# Patient Record
Sex: MALE | Race: WHITE | HISPANIC OR LATINO | URBAN - METROPOLITAN AREA
[De-identification: names, ages, dates, MRNs, and addresses within clinical notes are randomized per-mention and may not be internally consistent; named-entity substitution may affect disease eponyms.]

---

## 2017-04-10 ENCOUNTER — OUTPATIENT (OUTPATIENT)
Dept: OUTPATIENT SERVICES | Facility: HOSPITAL | Age: 57
LOS: 1 days | End: 2017-04-10
Payer: COMMERCIAL

## 2017-04-10 VITALS
RESPIRATION RATE: 16 BRPM | SYSTOLIC BLOOD PRESSURE: 154 MMHG | HEIGHT: 70 IN | TEMPERATURE: 98 F | DIASTOLIC BLOOD PRESSURE: 90 MMHG | HEART RATE: 70 BPM | WEIGHT: 194.01 LBS

## 2017-04-10 DIAGNOSIS — Z98.890 OTHER SPECIFIED POSTPROCEDURAL STATES: Chronic | ICD-10-CM

## 2017-04-10 DIAGNOSIS — I25.10 ATHEROSCLEROTIC HEART DISEASE OF NATIVE CORONARY ARTERY WITHOUT ANGINA PECTORIS: Chronic | ICD-10-CM

## 2017-04-10 DIAGNOSIS — Z01.818 ENCOUNTER FOR OTHER PREPROCEDURAL EXAMINATION: ICD-10-CM

## 2017-04-10 DIAGNOSIS — S83.272A COMPLEX TEAR OF LATERAL MENISCUS, CURRENT INJURY, LEFT KNEE, INITIAL ENCOUNTER: ICD-10-CM

## 2017-04-10 DIAGNOSIS — M25.562 PAIN IN LEFT KNEE: ICD-10-CM

## 2017-04-10 DIAGNOSIS — I25.10 ATHEROSCLEROTIC HEART DISEASE OF NATIVE CORONARY ARTERY WITHOUT ANGINA PECTORIS: ICD-10-CM

## 2017-04-10 LAB
ANION GAP SERPL CALC-SCNC: 10 MMOL/L — SIGNIFICANT CHANGE UP (ref 5–17)
BUN SERPL-MCNC: 23 MG/DL — SIGNIFICANT CHANGE UP (ref 7–23)
CALCIUM SERPL-MCNC: 8.7 MG/DL — SIGNIFICANT CHANGE UP (ref 8.5–10.1)
CHLORIDE SERPL-SCNC: 103 MMOL/L — SIGNIFICANT CHANGE UP (ref 96–108)
CO2 SERPL-SCNC: 24 MMOL/L — SIGNIFICANT CHANGE UP (ref 22–31)
CREAT SERPL-MCNC: 0.78 MG/DL — SIGNIFICANT CHANGE UP (ref 0.5–1.3)
GLUCOSE SERPL-MCNC: 182 MG/DL — HIGH (ref 70–99)
HBA1C BLD-MCNC: 9 % — HIGH (ref 4–5.6)
HCT VFR BLD CALC: 43.4 % — SIGNIFICANT CHANGE UP (ref 39–50)
HGB BLD-MCNC: 15.2 G/DL — SIGNIFICANT CHANGE UP (ref 13–17)
MCHC RBC-ENTMCNC: 31.9 PG — SIGNIFICANT CHANGE UP (ref 27–34)
MCHC RBC-ENTMCNC: 35 GM/DL — SIGNIFICANT CHANGE UP (ref 32–36)
MCV RBC AUTO: 91.1 FL — SIGNIFICANT CHANGE UP (ref 80–100)
PLATELET # BLD AUTO: 197 K/UL — SIGNIFICANT CHANGE UP (ref 150–400)
POTASSIUM SERPL-MCNC: 4.1 MMOL/L — SIGNIFICANT CHANGE UP (ref 3.5–5.3)
POTASSIUM SERPL-SCNC: 4.1 MMOL/L — SIGNIFICANT CHANGE UP (ref 3.5–5.3)
RBC # BLD: 4.76 M/UL — SIGNIFICANT CHANGE UP (ref 4.2–5.8)
RBC # FLD: 12.2 % — SIGNIFICANT CHANGE UP (ref 10.3–14.5)
SODIUM SERPL-SCNC: 137 MMOL/L — SIGNIFICANT CHANGE UP (ref 135–145)
WBC # BLD: 8.1 K/UL — SIGNIFICANT CHANGE UP (ref 3.8–10.5)
WBC # FLD AUTO: 8.1 K/UL — SIGNIFICANT CHANGE UP (ref 3.8–10.5)

## 2017-04-10 PROCEDURE — 93010 ELECTROCARDIOGRAM REPORT: CPT | Mod: NC

## 2017-04-10 PROCEDURE — 80048 BASIC METABOLIC PNL TOTAL CA: CPT

## 2017-04-10 PROCEDURE — 93005 ELECTROCARDIOGRAM TRACING: CPT

## 2017-04-10 PROCEDURE — G0463: CPT

## 2017-04-10 PROCEDURE — 83036 HEMOGLOBIN GLYCOSYLATED A1C: CPT

## 2017-04-10 PROCEDURE — 85027 COMPLETE CBC AUTOMATED: CPT

## 2017-04-10 NOTE — H&P PST ADULT - PMH
CAD S/P percutaneous coronary angioplasty  7/10 5 stents and 7/14 one stent  History of hyperlipidemia    Hypertension    Neuropathy    Type 2 diabetes mellitus BPH (benign prostatic hyperplasia)    CAD S/P percutaneous coronary angioplasty  7/10 5 stents and 7/14 one stent  History of hyperlipidemia    Hypertension    Neuropathy    Type 2 diabetes mellitus

## 2017-04-10 NOTE — H&P PST ADULT - ASSESSMENT
[age] y/o male with left knee meniscus tear, scheduled for left knee arthroscopy. Pre op testing today. medical eval with Dr Yuan and cardiac eval with Dr Hagan advised. Will continue ASA 81 mg as advised by Dr Hagan.

## 2017-04-10 NOTE — H&P PST ADULT - HISTORY OF PRESENT ILLNESS
56 y/o male with c/o pain in the left knee since Dec 18 2016, was hit  by a forklift, pain in the left knee since then[place]. Taking Tylenol for pain. Pre op testing today. 56 y/o male, PMH of HTN, CAD, T2DM, in PST with c/o pain in the left knee since Dec 18 2016, was hit  by a forklift, pain in the left knee since then. [place]. Taking Tylenol for pain. MRI ,shows meniscus tear. Pre op testing today scheduled for left knee arthroscopy

## 2017-04-10 NOTE — H&P PST ADULT - NSANTHOSAYNRD_GEN_A_CORE
No. LEYLA screening performed.  STOP BANG Legend: 0-2 = LOW Risk; 3-4 = INTERMEDIATE Risk; 5-8 = HIGH Risk

## 2017-04-10 NOTE — H&P PST ADULT - FAMILY HISTORY
Mother  Still living? Yes, Estimated age: 81-90  Family history of type 2 diabetes mellitus, Age at diagnosis: Age Unknown     Father  Still living? No  FH: stroke, Age at diagnosis: Age Unknown

## 2017-05-02 ENCOUNTER — TRANSCRIPTION ENCOUNTER (OUTPATIENT)
Age: 57
End: 2017-05-02

## 2017-05-02 RX ORDER — ACETAMINOPHEN 500 MG
650 TABLET ORAL ONCE
Qty: 0 | Refills: 0 | Status: COMPLETED | OUTPATIENT
Start: 2017-05-03 | End: 2017-05-03

## 2017-05-02 RX ORDER — SODIUM CHLORIDE 9 MG/ML
1000 INJECTION, SOLUTION INTRAVENOUS
Qty: 0 | Refills: 0 | Status: DISCONTINUED | OUTPATIENT
Start: 2017-05-03 | End: 2017-05-03

## 2017-05-03 ENCOUNTER — OUTPATIENT (OUTPATIENT)
Dept: OUTPATIENT SERVICES | Facility: HOSPITAL | Age: 57
LOS: 1 days | Discharge: HOME | End: 2017-05-03
Payer: COMMERCIAL

## 2017-05-03 VITALS
HEART RATE: 66 BPM | SYSTOLIC BLOOD PRESSURE: 136 MMHG | WEIGHT: 194.01 LBS | DIASTOLIC BLOOD PRESSURE: 81 MMHG | TEMPERATURE: 98 F | RESPIRATION RATE: 16 BRPM | HEIGHT: 70 IN | OXYGEN SATURATION: 95 %

## 2017-05-03 VITALS
SYSTOLIC BLOOD PRESSURE: 135 MMHG | RESPIRATION RATE: 13 BRPM | DIASTOLIC BLOOD PRESSURE: 71 MMHG | OXYGEN SATURATION: 95 % | HEART RATE: 71 BPM

## 2017-05-03 DIAGNOSIS — Z98.890 OTHER SPECIFIED POSTPROCEDURAL STATES: Chronic | ICD-10-CM

## 2017-05-03 DIAGNOSIS — I25.10 ATHEROSCLEROTIC HEART DISEASE OF NATIVE CORONARY ARTERY WITHOUT ANGINA PECTORIS: Chronic | ICD-10-CM

## 2017-05-03 DIAGNOSIS — Z01.818 ENCOUNTER FOR OTHER PREPROCEDURAL EXAMINATION: ICD-10-CM

## 2017-05-03 DIAGNOSIS — S83.272A COMPLEX TEAR OF LATERAL MENISCUS, CURRENT INJURY, LEFT KNEE, INITIAL ENCOUNTER: ICD-10-CM

## 2017-05-03 PROCEDURE — 88304 TISSUE EXAM BY PATHOLOGIST: CPT | Mod: 26

## 2017-05-03 PROCEDURE — 29880 ARTHRS KNE SRG MNISECTMY M&L: CPT | Mod: LT

## 2017-05-03 PROCEDURE — 88304 TISSUE EXAM BY PATHOLOGIST: CPT

## 2017-05-03 RX ORDER — DULOXETINE HYDROCHLORIDE 30 MG/1
0 CAPSULE, DELAYED RELEASE ORAL
Qty: 0 | Refills: 0 | COMMUNITY

## 2017-05-03 RX ORDER — HYDROMORPHONE HYDROCHLORIDE 2 MG/ML
0.5 INJECTION INTRAMUSCULAR; INTRAVENOUS; SUBCUTANEOUS
Qty: 0 | Refills: 0 | Status: DISCONTINUED | OUTPATIENT
Start: 2017-05-03 | End: 2017-05-04

## 2017-05-03 RX ORDER — ASPIRIN/CALCIUM CARB/MAGNESIUM 324 MG
1 TABLET ORAL
Qty: 0 | Refills: 0 | COMMUNITY

## 2017-05-03 RX ORDER — TAMSULOSIN HYDROCHLORIDE 0.4 MG/1
1 CAPSULE ORAL
Qty: 0 | Refills: 0 | COMMUNITY

## 2017-05-03 RX ORDER — ROSUVASTATIN CALCIUM 5 MG/1
1 TABLET ORAL
Qty: 0 | Refills: 0 | COMMUNITY

## 2017-05-03 RX ORDER — MELOXICAM 15 MG/1
1 TABLET ORAL
Qty: 0 | Refills: 0 | COMMUNITY

## 2017-05-03 RX ORDER — OXYCODONE HYDROCHLORIDE 5 MG/1
1 TABLET ORAL
Qty: 20 | Refills: 0 | OUTPATIENT
Start: 2017-05-03

## 2017-05-03 RX ORDER — SODIUM CHLORIDE 9 MG/ML
1000 INJECTION, SOLUTION INTRAVENOUS
Qty: 0 | Refills: 0 | Status: DISCONTINUED | OUTPATIENT
Start: 2017-05-03 | End: 2017-05-04

## 2017-05-03 RX ORDER — CEFAZOLIN SODIUM 1 G
2000 VIAL (EA) INJECTION ONCE
Qty: 0 | Refills: 0 | Status: COMPLETED | OUTPATIENT
Start: 2017-05-03 | End: 2017-05-03

## 2017-05-03 RX ORDER — CLOPIDOGREL BISULFATE 75 MG/1
1 TABLET, FILM COATED ORAL
Qty: 0 | Refills: 0 | COMMUNITY

## 2017-05-03 RX ORDER — OMEGA-3 ACID ETHYL ESTERS 1 G
0 CAPSULE ORAL
Qty: 0 | Refills: 0 | COMMUNITY

## 2017-05-03 RX ORDER — OXYCODONE HYDROCHLORIDE 5 MG/1
5 TABLET ORAL EVERY 4 HOURS
Qty: 0 | Refills: 0 | Status: DISCONTINUED | OUTPATIENT
Start: 2017-05-03 | End: 2017-05-04

## 2017-05-03 RX ORDER — METFORMIN HYDROCHLORIDE 850 MG/1
1 TABLET ORAL
Qty: 0 | Refills: 0 | COMMUNITY

## 2017-05-03 RX ORDER — SITAGLIPTIN 50 MG/1
1 TABLET, FILM COATED ORAL
Qty: 0 | Refills: 0 | COMMUNITY

## 2017-05-03 RX ORDER — LISINOPRIL 2.5 MG/1
1 TABLET ORAL
Qty: 0 | Refills: 0 | COMMUNITY

## 2017-05-03 RX ORDER — NEBIVOLOL HYDROCHLORIDE 5 MG/1
1 TABLET ORAL
Qty: 0 | Refills: 0 | COMMUNITY

## 2017-05-03 RX ADMIN — Medication 650 MILLIGRAM(S): at 06:51

## 2017-05-03 RX ADMIN — HYDROMORPHONE HYDROCHLORIDE 0.5 MILLIGRAM(S): 2 INJECTION INTRAMUSCULAR; INTRAVENOUS; SUBCUTANEOUS at 09:36

## 2017-05-03 RX ADMIN — SODIUM CHLORIDE 50 MILLILITER(S): 9 INJECTION, SOLUTION INTRAVENOUS at 06:51

## 2017-05-03 RX ADMIN — SODIUM CHLORIDE 100 MILLILITER(S): 9 INJECTION, SOLUTION INTRAVENOUS at 09:22

## 2017-05-03 RX ADMIN — HYDROMORPHONE HYDROCHLORIDE 0.5 MILLIGRAM(S): 2 INJECTION INTRAMUSCULAR; INTRAVENOUS; SUBCUTANEOUS at 09:27

## 2017-05-03 NOTE — BRIEF OPERATIVE NOTE - PROCEDURE
Knee arthroscopy, left  05/03/2017  Left knee arthroscopy, partial medial and lateral meniscectomies and synovectomy  Active  [id number]

## 2017-05-03 NOTE — BRIEF OPERATIVE NOTE - POST-OP DX
Tear of meniscus of left knee  05/03/2017  Medial and Lateral, chondromalacia and synovitis  Active  Faustina Michaels

## 2017-05-03 NOTE — ASU PATIENT PROFILE, ADULT - PMH
BPH (benign prostatic hyperplasia)    CAD S/P percutaneous coronary angioplasty  7/10 5 stents and 7/14 one stent  History of hyperlipidemia    Hypertension    Neuropathy    Type 2 diabetes mellitus

## 2017-05-03 NOTE — ASU DISCHARGE PLAN (ADULT/PEDIATRIC). - NOTIFY
Swelling that continues/Fever greater than 101/Numbness, tingling/Pain not relieved by Medications/Bleeding that does not stop

## 2017-05-03 NOTE — ASU DISCHARGE PLAN (ADULT/PEDIATRIC). - SPECIAL INSTRUCTIONS
Apply waterproof ice pack to incision area 20 mins on, 20 mins off to help decrease pain and swelling. Keep dressing clean, dry and intact x 48 hrs. Remove dressing after 48 hrs and begin showering as usual. Do not scrub or soak incision site. Pat dry and apply clean band-aids to incision sites daily. NO tub baths, NO swimming pools.

## 2017-05-03 NOTE — ASU DISCHARGE PLAN (ADULT/PEDIATRIC). - ACTIVITY LEVEL
Keep Left lower extremity elevated/no heavy lifting/no exercise/elevate extremity/no sports/gym no heavy lifting/elevate extremity/Keep Left lower extremity elevated to help decrease pain and swelling./no sports/gym/no exercise

## 2017-05-03 NOTE — ASU DISCHARGE PLAN (ADULT/PEDIATRIC). - MEDICATION SUMMARY - MEDICATIONS TO TAKE
I will START or STAY ON the medications listed below when I get home from the hospital:    acetaminophen-oxycodone 325 mg-5 mg oral tablet  -- 1-2 tab(s) by mouth every 4-6 hours as needed for pain. MDD:6  -- Caution federal law prohibits the transfer of this drug to any person other  than the person for whom it was prescribed.  May cause drowsiness.  Alcohol may intensify this effect.  Use care when operating dangerous machinery.  This prescription cannot be refilled.  This product contains acetaminophen.  Do not use  with any other product containing acetaminophen to prevent possible liver damage.  Using more of this medication than prescribed may cause serious breathing problems.    -- Indication: For Pain med    aspirin 81 mg oral tablet  -- 1 tab(s) by mouth once a day  -- Indication: For Home med    meloxicam 15 mg oral tablet  -- 1 tab(s) by mouth once a day  -- Indication: For Home med/Anti-inflammatory    lisinopril 20 mg oral tablet  -- 1 tab(s) by mouth once a day  -- Indication: For Home med    tamsulosin 0.4 mg oral capsule  -- 1 cap(s) by mouth once a day  -- Indication: For Home med    DULoxetine 30 mg oral delayed release capsule  --  by mouth once daily  -- Indication: For Home med    metFORMIN 1000 mg oral tablet  -- 1 tab(s) by mouth 2 times a day  -- Indication: For Home med    Januvia 100 mg oral tablet  -- 1 tab(s) by mouth once a day  -- Indication: For HOme med    rosuvastatin 40 mg oral tablet  -- 1 tab(s) by mouth once a day (at bedtime)  -- Indication: For Home med    clopidogrel 75 mg oral tablet  -- 1 tab(s) by mouth once a day. RESTART 5/4/17  -- Indication: For Home med- RESTART 5/4/17    Bystolic 10 mg oral tablet  -- 1 tab(s) by mouth once a day  -- Indication: For Home med    Fish Oil 1000 mg oral capsule  --  by mouth once daily  -- Indication: For Home supplement    multivitamin  --   once daily  -- Indication: For Home supplement

## 2017-05-04 LAB — SURGICAL PATHOLOGY FINAL REPORT - CH: SIGNIFICANT CHANGE UP

## 2017-05-08 DIAGNOSIS — G62.9 POLYNEUROPATHY, UNSPECIFIED: ICD-10-CM
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
## 2017-05-08 DIAGNOSIS — S83.242A OTHER TEAR OF MEDIAL MENISCUS, CURRENT INJURY, LEFT KNEE, INITIAL ENCOUNTER: ICD-10-CM

## 2017-05-08 DIAGNOSIS — I25.10 ATHEROSCLEROTIC HEART DISEASE OF NATIVE CORONARY ARTERY WITHOUT ANGINA PECTORIS: ICD-10-CM

## 2017-05-08 DIAGNOSIS — M94.262 CHONDROMALACIA, LEFT KNEE: ICD-10-CM

## 2017-05-08 DIAGNOSIS — S83.282A OTHER TEAR OF LATERAL MENISCUS, CURRENT INJURY, LEFT KNEE, INITIAL ENCOUNTER: ICD-10-CM

## 2017-05-08 DIAGNOSIS — Z91.013 ALLERGY TO SEAFOOD: ICD-10-CM

## 2017-05-08 DIAGNOSIS — Z79.02 LONG TERM (CURRENT) USE OF ANTITHROMBOTICS/ANTIPLATELETS: ICD-10-CM

## 2017-05-08 DIAGNOSIS — I10 ESSENTIAL (PRIMARY) HYPERTENSION: ICD-10-CM

## 2017-05-08 DIAGNOSIS — E78.5 HYPERLIPIDEMIA, UNSPECIFIED: ICD-10-CM

## 2017-05-08 DIAGNOSIS — M65.862 OTHER SYNOVITIS AND TENOSYNOVITIS, LEFT LOWER LEG: ICD-10-CM

## 2017-05-08 DIAGNOSIS — E78.00 PURE HYPERCHOLESTEROLEMIA, UNSPECIFIED: ICD-10-CM

## 2017-05-08 DIAGNOSIS — Z79.82 LONG TERM (CURRENT) USE OF ASPIRIN: ICD-10-CM

## 2017-05-08 DIAGNOSIS — E11.9 TYPE 2 DIABETES MELLITUS WITHOUT COMPLICATIONS: ICD-10-CM

## 2017-05-08 DIAGNOSIS — N40.0 BENIGN PROSTATIC HYPERPLASIA WITHOUT LOWER URINARY TRACT SYMPTOMS: ICD-10-CM

## 2018-05-30 ENCOUNTER — INPATIENT (INPATIENT)
Facility: HOSPITAL | Age: 58
LOS: 8 days | Discharge: HOME HEALTH | DRG: 949 | End: 2018-06-08
Attending: PHYSICAL MEDICINE & REHABILITATION | Admitting: PHYSICAL MEDICINE & REHABILITATION
Payer: COMMERCIAL

## 2018-05-30 DIAGNOSIS — Z98.890 OTHER SPECIFIED POSTPROCEDURAL STATES: Chronic | ICD-10-CM

## 2018-05-30 DIAGNOSIS — I63.9 CEREBRAL INFARCTION, UNSPECIFIED: ICD-10-CM

## 2018-05-30 DIAGNOSIS — I25.10 ATHEROSCLEROTIC HEART DISEASE OF NATIVE CORONARY ARTERY WITHOUT ANGINA PECTORIS: Chronic | ICD-10-CM

## 2018-05-30 PROCEDURE — 99222 1ST HOSP IP/OBS MODERATE 55: CPT

## 2018-05-31 PROCEDURE — 93010 ELECTROCARDIOGRAM REPORT: CPT

## 2018-05-31 PROCEDURE — 99255 IP/OBS CONSLTJ NEW/EST HI 80: CPT

## 2018-05-31 PROCEDURE — 99232 SBSQ HOSP IP/OBS MODERATE 35: CPT

## 2018-06-01 PROCEDURE — 99232 SBSQ HOSP IP/OBS MODERATE 35: CPT

## 2018-06-01 PROCEDURE — 99233 SBSQ HOSP IP/OBS HIGH 50: CPT

## 2018-06-02 PROCEDURE — 99232 SBSQ HOSP IP/OBS MODERATE 35: CPT

## 2018-06-02 PROCEDURE — 99233 SBSQ HOSP IP/OBS HIGH 50: CPT

## 2018-06-03 PROCEDURE — 99232 SBSQ HOSP IP/OBS MODERATE 35: CPT

## 2018-06-04 PROCEDURE — 99232 SBSQ HOSP IP/OBS MODERATE 35: CPT

## 2018-06-05 PROCEDURE — 99232 SBSQ HOSP IP/OBS MODERATE 35: CPT

## 2018-06-05 PROCEDURE — 99233 SBSQ HOSP IP/OBS HIGH 50: CPT

## 2018-06-06 PROCEDURE — 99232 SBSQ HOSP IP/OBS MODERATE 35: CPT

## 2018-06-07 PROCEDURE — 99233 SBSQ HOSP IP/OBS HIGH 50: CPT

## 2018-06-07 PROCEDURE — 99232 SBSQ HOSP IP/OBS MODERATE 35: CPT

## 2018-06-08 PROCEDURE — 99239 HOSP IP/OBS DSCHRG MGMT >30: CPT

## 2018-06-09 PROCEDURE — 97163 PT EVAL HIGH COMPLEX 45 MIN: CPT

## 2018-06-09 PROCEDURE — 97112 NEUROMUSCULAR REEDUCATION: CPT

## 2018-06-09 PROCEDURE — 92523 SPEECH SOUND LANG COMPREHEN: CPT

## 2018-06-09 PROCEDURE — 97167 OT EVAL HIGH COMPLEX 60 MIN: CPT

## 2018-06-09 PROCEDURE — 93005 ELECTROCARDIOGRAM TRACING: CPT

## 2018-06-09 PROCEDURE — 85025 COMPLETE CBC W/AUTO DIFF WBC: CPT

## 2018-06-09 PROCEDURE — 83036 HEMOGLOBIN GLYCOSYLATED A1C: CPT

## 2018-06-09 PROCEDURE — 81001 URINALYSIS AUTO W/SCOPE: CPT

## 2018-06-09 PROCEDURE — 80053 COMPREHEN METABOLIC PANEL: CPT

## 2018-06-09 PROCEDURE — 97116 GAIT TRAINING THERAPY: CPT

## 2018-06-09 PROCEDURE — 84100 ASSAY OF PHOSPHORUS: CPT

## 2018-06-09 PROCEDURE — 97535 SELF CARE MNGMENT TRAINING: CPT

## 2018-06-09 PROCEDURE — 80048 BASIC METABOLIC PNL TOTAL CA: CPT

## 2018-06-09 PROCEDURE — 85027 COMPLETE CBC AUTOMATED: CPT

## 2018-06-09 PROCEDURE — 83735 ASSAY OF MAGNESIUM: CPT

## 2018-06-09 PROCEDURE — 97110 THERAPEUTIC EXERCISES: CPT

## 2018-06-09 PROCEDURE — 97140 MANUAL THERAPY 1/> REGIONS: CPT

## 2018-06-09 PROCEDURE — 97530 THERAPEUTIC ACTIVITIES: CPT

## 2018-06-14 PROBLEM — Z00.00 ENCOUNTER FOR PREVENTIVE HEALTH EXAMINATION: Status: ACTIVE | Noted: 2018-06-14

## 2018-06-16 DIAGNOSIS — Z95.5 PRESENCE OF CORONARY ANGIOPLASTY IMPLANT AND GRAFT: ICD-10-CM

## 2018-06-16 DIAGNOSIS — R26.9 UNSPECIFIED ABNORMALITIES OF GAIT AND MOBILITY: ICD-10-CM

## 2018-06-16 DIAGNOSIS — E78.5 HYPERLIPIDEMIA, UNSPECIFIED: ICD-10-CM

## 2018-06-16 DIAGNOSIS — Z51.89 ENCOUNTER FOR OTHER SPECIFIED AFTERCARE: ICD-10-CM

## 2018-06-16 DIAGNOSIS — I25.10 ATHEROSCLEROTIC HEART DISEASE OF NATIVE CORONARY ARTERY WITHOUT ANGINA PECTORIS: ICD-10-CM

## 2018-06-16 DIAGNOSIS — I10 ESSENTIAL (PRIMARY) HYPERTENSION: ICD-10-CM

## 2018-06-16 DIAGNOSIS — E11.65 TYPE 2 DIABETES MELLITUS WITH HYPERGLYCEMIA: ICD-10-CM

## 2018-06-16 DIAGNOSIS — M19.012 PRIMARY OSTEOARTHRITIS, LEFT SHOULDER: ICD-10-CM

## 2018-06-16 DIAGNOSIS — Z79.4 LONG TERM (CURRENT) USE OF INSULIN: ICD-10-CM

## 2018-06-16 DIAGNOSIS — G47.00 INSOMNIA, UNSPECIFIED: ICD-10-CM

## 2018-06-16 DIAGNOSIS — N40.1 BENIGN PROSTATIC HYPERPLASIA WITH LOWER URINARY TRACT SYMPTOMS: ICD-10-CM

## 2018-06-16 DIAGNOSIS — M47.816 SPONDYLOSIS WITHOUT MYELOPATHY OR RADICULOPATHY, LUMBAR REGION: ICD-10-CM

## 2018-06-16 DIAGNOSIS — I69.354 HEMIPLEGIA AND HEMIPARESIS FOLLOWING CEREBRAL INFARCTION AFFECTING LEFT NON-DOMINANT SIDE: ICD-10-CM

## 2018-07-16 ENCOUNTER — APPOINTMENT (OUTPATIENT)
Dept: PHYSICAL MEDICINE AND REHAB | Facility: CLINIC | Age: 58
End: 2018-07-16

## 2018-09-10 PROBLEM — I25.10 ATHEROSCLEROTIC HEART DISEASE OF NATIVE CORONARY ARTERY WITHOUT ANGINA PECTORIS: Chronic | Status: ACTIVE | Noted: 2017-04-10

## 2018-09-10 PROBLEM — Z86.39 PERSONAL HISTORY OF OTHER ENDOCRINE, NUTRITIONAL AND METABOLIC DISEASE: Chronic | Status: ACTIVE | Noted: 2017-04-10

## 2018-09-10 PROBLEM — N40.0 BENIGN PROSTATIC HYPERPLASIA WITHOUT LOWER URINARY TRACT SYMPTOMS: Chronic | Status: ACTIVE | Noted: 2017-04-10

## 2018-09-10 PROBLEM — I10 ESSENTIAL (PRIMARY) HYPERTENSION: Chronic | Status: ACTIVE | Noted: 2017-04-10

## 2018-09-10 PROBLEM — G62.9 POLYNEUROPATHY, UNSPECIFIED: Chronic | Status: ACTIVE | Noted: 2017-04-10

## 2018-09-10 PROBLEM — E11.9 TYPE 2 DIABETES MELLITUS WITHOUT COMPLICATIONS: Chronic | Status: ACTIVE | Noted: 2017-04-10

## 2019-05-16 NOTE — ASU PATIENT PROFILE, ADULT - NS PRO AD PATIENT TYPE
Subjective:      Patient ID: Lito De Los Santos is a 32 y.o. male. HPI        Drain back abscess 5/3/19            Gram Stain Result Abnormal  05/03/2019 10:03 AM [address] [facility]   Moderate WBC's   Moderate Gram positive cocci in pairs    WOUND/ABSCESS 05/03/2019 10:03 AM [facility]   No growth 48 hours    Organism Abnormal  05/03/2019 10:03 AM [facility]   Anaerobic gram positive cocci    Anaerobic Culture 05/03/2019 10:03 AM [facility]   Light growth   Sensitivities not routinely done. Drugs of choice are:   Penicillin G, Metronidazole, Clindamycin or   Piperacillin/Tazobactam.          HIV   ON TRIMEQ X 5-6 YEARS  HX OF non compliance    CD4 % [place] T Cell 18Low   32 - 64 % Final 03/06/2019  3:44 PM [facility]   Absolute CD 4 [place] 413Low   430 - 1800 cells/uL Final 03/06/2019  3:44 PM [facility]       HIV-1 QNT, IU/ML <30 Detected  cpy/mL Final 03/06/2019  3:44 PM [facility]   HIV-1 QNT Log, IU/ML <1.47  log cpy/mL Final 03/06/2019  3:44 PM [facility]         Component Collected Lab   EER HIV Genotyping 01/09/2019  2:37 PM [facility]   See Note    Comment:   Access [facility] Enhanced Report using either link below:     -Direct access: [email or web address]     -Enter Username, Password: [email or web address]    Username: [email or web address]    Password: [email or web address]   Performed by [place] [id number], [id number] [facility] [phone]   www. Judith Dominguez MD - Lab.  Director    HIV-1 Genotype 01/09/2019  2:37 PM [facility]   See Note    Comment:     Drug Resistance:   NRTI Drug Class       VIDEX, (didanosine, ddI)                             None     VIREAD, (tenofovir, TDF)                             None     ZERIT, (stavudine, d4T)                              None     ZIAGEN, (abacavir, ABC)                              None     EMTRIVA, (emtricitabine, FTC)                        None     RETROVIR, (zidovudine, ZDV)                          None     EPIVIR, (lamivudine, 3TC)                            None       NRTI drug resistance mutations identified: None     NNRTI Drug Class       SUSTIVA, (efavirenz, EFV)                            None     VIRAMUNE, (nevirapine, NVP)                          None     INTELENCE, (etravirine, ETR)                         None     EDURANT, (rilpivirine, RPV)                          None       NNRTI drug resistance mutations identified: V108I     PI+ Drug Class       VIRACEPT, (nelfinavir, NFV)                          None     APTIVUS, (tipranavir, TPV)                           None     CRIXIVAN, (indinavir, IDV)                           None     KALETRA, (lopinavir + ritonavir, LPV)                None     REYATAZ, (atazanavir, ATV)                           None     PREZISTA, (darunavir, DRV)                           None     LEXIVA, (fosamprenavir, FPV)                         None     FORTOVASE / INVIRASE, (saquinavir, SQV)              None       PI+ drug resistance mutations identified: None          PAST MEDICAL HISTORY      Past Medical History        Past Medical History:   Diagnosis Date    Immune deficiency disorder (HCC)       HIV not in treatment currently               SURGICAL HISTORY     Past Surgical History   History reviewed. No pertinent surgical history.           ALLERGIES     Patient has no known allergies.     FAMILY HISTORY     Family History   History reviewed. No pertinent family history.           SOCIAL HISTORY        Social History               Review of Systems   Constitutional: Negative. Negative for chills, diaphoresis, fatigue and fever. HENT: Negative. Eyes: Negative. Respiratory: Negative. Negative for cough and shortness of breath. Gastrointestinal: Negative. Negative for abdominal pain, diarrhea, nausea and vomiting. Musculoskeletal: Negative. Neurological: Negative. Objective:   Physical Exam   Constitutional: He appears well-developed.    HENT:   Head: Normocephalic. Eyes: Pupils are equal, round, and reactive to light. Neck: No JVD present. No tracheal deviation present. No thyromegaly present. Cardiovascular: Normal rate, regular rhythm and intact distal pulses. Exam reveals no gallop and no friction rub. No murmur heard. Pulmonary/Chest: Effort normal and breath sounds normal. No respiratory distress. He has no wheezes. He has no rales. He exhibits no tenderness. Abdominal: Soft. Bowel sounds are normal. He exhibits no distension and no mass. There is no tenderness. There is no rebound and no guarding. No hernia. Musculoskeletal: He exhibits no edema or tenderness. Lymphadenopathy:     He has no cervical adenopathy. Neurological: He is alert. No cranial nerve deficit. Coordination normal.   Skin: Skin is warm. Assessment:       Diagnosis Orders   1. HIV (human immunodeficiency virus infection) ([address]        Diagnosis Orders   1.  HIV (human immunodeficiency virus infection) ([address]       Late latent syphilis      Plan:          Kody Ledezma          RETREATED  Late latent syphilis TREATMENT            Lisandra Soto MD Health Care Proxy (HCP)

## 2019-12-22 NOTE — ASU DISCHARGE PLAN (ADULT/PEDIATRIC). - MEDICATION SUMMARY - MEDICATIONS TO CHANGE
Wheezes
I will SWITCH the dose or number of times a day I take the medications listed below when I get home from the hospital:  None

## 2020-11-21 ENCOUNTER — TRANSCRIPTION ENCOUNTER (OUTPATIENT)
Age: 60
End: 2020-11-21

## 2022-01-04 ENCOUNTER — TRANSCRIPTION ENCOUNTER (OUTPATIENT)
Age: 62
End: 2022-01-04

## 2022-10-13 NOTE — ASU PREOP CHECKLIST - SELECT TESTS ORDERED
CMP/hgb a1c/CBC/EKG Gabapentin Counseling: I discussed with the patient the risks of gabapentin including but not limited to dizziness, somnolence, fatigue and ataxia.

## 2022-11-18 NOTE — ASU PATIENT PROFILE, ADULT - PSH
no fever CAD (coronary artery disease)  6 stents in the past 5 2010 and 1 in 2014  H/O laminectomy  2015

## 2024-01-08 ENCOUNTER — NON-APPOINTMENT (OUTPATIENT)
Age: 64
End: 2024-01-08

## 2024-01-10 NOTE — H&P PST ADULT - NS ABD PE RECTAL EXAM
Rx Controlled Refill Request Telephone Encounter    Name: Chris Stone  :  1979    Medication Name:   ADDERAL  Dose (Optional):   30 MG   Quantity (Optional):   60  Directions (Optional):   TAKE 1 CAPSULE     ALLERGIES:    NO KNOWN ALLERGIES     LAST DRUG SCREEN:     23  LAST MED CONTRACT:    23    Specific Pharmacy location:    [facility]     Date of last appointment:    23  Date of next appointment:    NOT SCHEDULED     Best number to reach patient:    [phone]    
not examined

## 2024-05-10 ENCOUNTER — EMERGENCY (EMERGENCY)
Facility: HOSPITAL | Age: 64
LOS: 0 days | Discharge: HOME | End: 2024-05-10
Attending: EMERGENCY MEDICINE
Payer: MEDICARE

## 2024-05-10 VITALS
TEMPERATURE: 98 F | OXYGEN SATURATION: 96 % | SYSTOLIC BLOOD PRESSURE: 155 MMHG | RESPIRATION RATE: 16 BRPM | HEART RATE: 57 BPM | DIASTOLIC BLOOD PRESSURE: 83 MMHG

## 2024-05-10 VITALS
DIASTOLIC BLOOD PRESSURE: 86 MMHG | RESPIRATION RATE: 18 BRPM | SYSTOLIC BLOOD PRESSURE: 190 MMHG | HEART RATE: 64 BPM | WEIGHT: 187.17 LBS | TEMPERATURE: 98 F | OXYGEN SATURATION: 99 %

## 2024-05-10 DIAGNOSIS — Z86.73 PERSONAL HISTORY OF TRANSIENT ISCHEMIC ATTACK (TIA), AND CEREBRAL INFARCTION WITHOUT RESIDUAL DEFICITS: ICD-10-CM

## 2024-05-10 DIAGNOSIS — R42 DIZZINESS AND GIDDINESS: ICD-10-CM

## 2024-05-10 DIAGNOSIS — I25.10 ATHEROSCLEROTIC HEART DISEASE OF NATIVE CORONARY ARTERY WITHOUT ANGINA PECTORIS: ICD-10-CM

## 2024-05-10 DIAGNOSIS — Z98.890 OTHER SPECIFIED POSTPROCEDURAL STATES: Chronic | ICD-10-CM

## 2024-05-10 DIAGNOSIS — Z91.013 ALLERGY TO SEAFOOD: ICD-10-CM

## 2024-05-10 DIAGNOSIS — Z95.5 PRESENCE OF CORONARY ANGIOPLASTY IMPLANT AND GRAFT: ICD-10-CM

## 2024-05-10 DIAGNOSIS — I25.10 ATHEROSCLEROTIC HEART DISEASE OF NATIVE CORONARY ARTERY WITHOUT ANGINA PECTORIS: Chronic | ICD-10-CM

## 2024-05-10 LAB
ALBUMIN SERPL ELPH-MCNC: 3.5 G/DL — SIGNIFICANT CHANGE UP (ref 3.3–5)
ALP SERPL-CCNC: 55 U/L — SIGNIFICANT CHANGE UP (ref 40–120)
ALT FLD-CCNC: 22 U/L — SIGNIFICANT CHANGE UP (ref 12–78)
ANION GAP SERPL CALC-SCNC: 4 MMOL/L — LOW (ref 5–17)
AST SERPL-CCNC: 13 U/L — LOW (ref 15–37)
BASOPHILS # BLD AUTO: 0.04 K/UL — SIGNIFICANT CHANGE UP (ref 0–0.2)
BASOPHILS NFR BLD AUTO: 0.5 % — SIGNIFICANT CHANGE UP (ref 0–2)
BILIRUB SERPL-MCNC: 0.4 MG/DL — SIGNIFICANT CHANGE UP (ref 0.2–1.2)
BUN SERPL-MCNC: 19 MG/DL — SIGNIFICANT CHANGE UP (ref 7–23)
CALCIUM SERPL-MCNC: 8.7 MG/DL — SIGNIFICANT CHANGE UP (ref 8.5–10.1)
CHLORIDE SERPL-SCNC: 111 MMOL/L — HIGH (ref 96–108)
CO2 SERPL-SCNC: 26 MMOL/L — SIGNIFICANT CHANGE UP (ref 22–31)
CREAT SERPL-MCNC: 0.96 MG/DL — SIGNIFICANT CHANGE UP (ref 0.5–1.3)
EGFR: 87 ML/MIN/1.73M2 — SIGNIFICANT CHANGE UP
EOSINOPHIL # BLD AUTO: 0.26 K/UL — SIGNIFICANT CHANGE UP (ref 0–0.5)
EOSINOPHIL NFR BLD AUTO: 3 % — SIGNIFICANT CHANGE UP (ref 0–6)
GLUCOSE SERPL-MCNC: 164 MG/DL — HIGH (ref 70–99)
HCT VFR BLD CALC: 38.4 % — LOW (ref 39–50)
HGB BLD-MCNC: 12.2 G/DL — LOW (ref 13–17)
IMM GRANULOCYTES NFR BLD AUTO: 0.2 % — SIGNIFICANT CHANGE UP (ref 0–0.9)
LYMPHOCYTES # BLD AUTO: 1.16 K/UL — SIGNIFICANT CHANGE UP (ref 1–3.3)
LYMPHOCYTES # BLD AUTO: 13.4 % — SIGNIFICANT CHANGE UP (ref 13–44)
MCHC RBC-ENTMCNC: 27.7 PG — SIGNIFICANT CHANGE UP (ref 27–34)
MCHC RBC-ENTMCNC: 31.8 GM/DL — LOW (ref 32–36)
MCV RBC AUTO: 87.1 FL — SIGNIFICANT CHANGE UP (ref 80–100)
MONOCYTES # BLD AUTO: 0.4 K/UL — SIGNIFICANT CHANGE UP (ref 0–0.9)
MONOCYTES NFR BLD AUTO: 4.6 % — SIGNIFICANT CHANGE UP (ref 2–14)
NEUTROPHILS # BLD AUTO: 6.76 K/UL — SIGNIFICANT CHANGE UP (ref 1.8–7.4)
NEUTROPHILS NFR BLD AUTO: 78.3 % — HIGH (ref 43–77)
PLATELET # BLD AUTO: 224 K/UL — SIGNIFICANT CHANGE UP (ref 150–400)
POTASSIUM SERPL-MCNC: 3.5 MMOL/L — SIGNIFICANT CHANGE UP (ref 3.5–5.3)
POTASSIUM SERPL-SCNC: 3.5 MMOL/L — SIGNIFICANT CHANGE UP (ref 3.5–5.3)
PROT SERPL-MCNC: 6.8 GM/DL — SIGNIFICANT CHANGE UP (ref 6–8.3)
RBC # BLD: 4.41 M/UL — SIGNIFICANT CHANGE UP (ref 4.2–5.8)
RBC # FLD: 14.6 % — HIGH (ref 10.3–14.5)
SODIUM SERPL-SCNC: 141 MMOL/L — SIGNIFICANT CHANGE UP (ref 135–145)
TROPONIN I, HIGH SENSITIVITY RESULT: 10.29 NG/L — SIGNIFICANT CHANGE UP
TROPONIN I, HIGH SENSITIVITY RESULT: 9.55 NG/L — SIGNIFICANT CHANGE UP
WBC # BLD: 8.64 K/UL — SIGNIFICANT CHANGE UP (ref 3.8–10.5)
WBC # FLD AUTO: 8.64 K/UL — SIGNIFICANT CHANGE UP (ref 3.8–10.5)

## 2024-05-10 PROCEDURE — 93010 ELECTROCARDIOGRAM REPORT: CPT

## 2024-05-10 PROCEDURE — 71045 X-RAY EXAM CHEST 1 VIEW: CPT | Mod: 26

## 2024-05-10 PROCEDURE — 36000 PLACE NEEDLE IN VEIN: CPT

## 2024-05-10 PROCEDURE — 93005 ELECTROCARDIOGRAM TRACING: CPT

## 2024-05-10 PROCEDURE — 71045 X-RAY EXAM CHEST 1 VIEW: CPT

## 2024-05-10 PROCEDURE — 99284 EMERGENCY DEPT VISIT MOD MDM: CPT

## 2024-05-10 PROCEDURE — 85025 COMPLETE CBC W/AUTO DIFF WBC: CPT

## 2024-05-10 PROCEDURE — 70450 CT HEAD/BRAIN W/O DYE: CPT | Mod: 26,MC

## 2024-05-10 PROCEDURE — 84484 ASSAY OF TROPONIN QUANT: CPT

## 2024-05-10 PROCEDURE — 80053 COMPREHEN METABOLIC PANEL: CPT

## 2024-05-10 PROCEDURE — 99285 EMERGENCY DEPT VISIT HI MDM: CPT | Mod: 25

## 2024-05-10 PROCEDURE — 70450 CT HEAD/BRAIN W/O DYE: CPT | Mod: MC

## 2024-05-10 PROCEDURE — 36415 COLL VENOUS BLD VENIPUNCTURE: CPT

## 2024-05-10 RX ORDER — NEBIVOLOL HYDROCHLORIDE 5 MG/1
20 TABLET ORAL ONCE
Refills: 0 | Status: COMPLETED | OUTPATIENT
Start: 2024-05-10 | End: 2024-05-10

## 2024-05-10 RX ORDER — SODIUM CHLORIDE 9 MG/ML
500 INJECTION INTRAMUSCULAR; INTRAVENOUS; SUBCUTANEOUS ONCE
Refills: 0 | Status: COMPLETED | OUTPATIENT
Start: 2024-05-10 | End: 2024-05-10

## 2024-05-10 RX ORDER — LISINOPRIL 2.5 MG/1
40 TABLET ORAL ONCE
Refills: 0 | Status: COMPLETED | OUTPATIENT
Start: 2024-05-10 | End: 2024-05-10

## 2024-05-10 RX ADMIN — SODIUM CHLORIDE 500 MILLILITER(S): 9 INJECTION INTRAMUSCULAR; INTRAVENOUS; SUBCUTANEOUS at 07:57

## 2024-05-10 RX ADMIN — SODIUM CHLORIDE 500 MILLILITER(S): 9 INJECTION INTRAMUSCULAR; INTRAVENOUS; SUBCUTANEOUS at 09:19

## 2024-05-10 RX ADMIN — LISINOPRIL 40 MILLIGRAM(S): 2.5 TABLET ORAL at 09:17

## 2024-05-10 RX ADMIN — NEBIVOLOL HYDROCHLORIDE 20 MILLIGRAM(S): 5 TABLET ORAL at 10:08

## 2024-05-10 NOTE — ED PROVIDER NOTE - CARE PROVIDER_API CALL
Oz Hagan  Interventional Cardiology  [address] [place] [address], [address]  [place] [address]  Phone: [phone]  Fax: [phone]  Follow Up Time: 1-3 Days

## 2024-05-10 NOTE — ED ADULT TRIAGE NOTE - NS ED NURSE DIRECT TO ROOM YN
[facility] Unit  Progress Note    Interval Hx  In room, working with SLP for diet / swallowing  Feels well overall  Added miconazole cream for groin and voltaren gel to L ankle swelling / pain, able to move foot through ankl  Add incentive spirometer   Endocrinology consult for DM / BS management has been on the high side since acute stay     Assessment and Plan of Care: This is an 83-year-old male with a past medical history of stroke 9 years ago with some residual left leg weakness with history of chronic ischemic heart disease, prostate cancer, hypertension, hyperlipidemia, type 2 diabetes, chronic kidney disease, atrial fibrillation. Now presents with left hemiparesis, upper extremity, on top of residual left lower extremity weakness with dysarthria, dysphagia, concern for cognitive impairment on top of hard of hearing with impaired mobility, ADLs.    Functionally:  Initial therapy evals  Continue therapies and plan of care.      Overall Management:   - optimize secondary stroke management, on xarelto for anticoagulation, BP management, DM / BS control, lipid management  - HTN, on HCTZ, metoprolol and lisinopril, adjust as needed  - DM / BS, will get endocrinology to help with management  - HLD, on lipitor  - on vit D, ferrous gluconate, flonase  - melatonin for sleep  - L ankle swelling, some pain, reports twisting ankle, able to move aROM on own w/o sig discomfort, fx suspicion low, will do ice pack and voltaren gel for now, monitor     Bladder and Bowel - monitor spont voids and BM, on flomax, miralax prn  GI ppx - on zantac  DVT ppx - optimize mech ppx with PCDs and antiembolism stockings, on xarelto, progressive mobilization         Active Diet Order      Combination Diet 5157-1414 Calories: Moderate Consistent CHO (4-6 CHO units/meal); Dysphagia Diet Level 2: Mechan Altered; Nectar Thickened Liquids (pre-thickened or use instant food  "thickener)    Labs    Recent Labs  Lab 02/28/17  0800 02/27/17  2101 02/27/17  1730 02/27/17  1437 02/27/17  0746 02/27/17  0738 02/27/17  0408  02/26/17  0757  02/25/17  0740  02/24/17  0740  02/23/17  0805  02/22/17  0520   GLC  --   --   --   --  301*  --   --   --  264*  --  248*  --  179*  --  143*  --  177*   * 229* 168* 188*  --  284* 264*  < >  --   < >  --   < >  --   < >  --   < >  --    < > = values in this interval not displayed.      Medications:  Current Facility-Administered Medications   Medication     miconazole (MICATIN; MICRO GUARD) 2 % powder     diclofenac (VOLTAREN) 1 % topical gel 2 g     acetaminophen (TYLENOL) tablet 650 mg     atorvastatin (LIPITOR) tablet 40 mg     cholecalciferol (vitamin D) tablet 2,000 Units     ferrous gluconate (FERGON) tablet 324 mg     fluticasone (FLONASE) 50 MCG/ACT spray 1-2 spray     hydrochlorothiazide (MICROZIDE) capsule 12.5 mg     lisinopril (PRINIVIL/ZESTRIL) tablet 10 mg     melatonin tablet 1 mg     metoprolol (LOPRESSOR) tablet 50 mg     polyethylene glycol (MIRALAX/GLYCOLAX) powder 17 g     rivaroxaban ANTICOAGULANT (XARELTO) tablet 20 mg     tamsulosin (FLOMAX) capsule 0.8 mg     glucose 40 % gel 15-30 g    Or     dextrose 50 % injection 25-50 mL    Or     glucagon injection 1 mg     insulin aspart (NovoLOG) inj (RAPID ACTING)     insulin aspart (NovoLOG) inj (RAPID ACTING)     ranitidine (ZANTAC) tablet 150 mg         Physical Examination:   /70 (BP Location: Right arm)  Pulse 69  Temp 97.4  F (36.3  C) (Oral)  Resp 16  Ht 1.778 m (5' 10\")  Wt 85 kg (187 lb 6.4 oz)  SpO2 94%  BMI 26.89 kg/m2  Sitting at chair, awake  Pleasant and cooperative  R side preference  NG tube in place  Some L facial droop  L hand and toe nails with fungal infection changes appears chronic   L UE flaccid w/o gross movement  L LE 4/5 hip flex, knee ext, but with limited aROM of ankle DF / PF     More than 25 minutes spent with at least half on care coordination " Yes

## 2024-05-10 NOTE — ED ADULT TRIAGE NOTE - CHIEF COMPLAINT QUOTE
ambulatory to triage complains of waking up with dizziness and lightheadedness at 0530, feels like head is spinning. alert and oriented x 4, ms strength 5/5 upper and lower ext bilaterally. ambulatory to triage complains of waking up with dizziness and lightheadedness at 0530, feels like head is spinning. alert and oriented x 4, ms strength 5/5 upper and lower ext bilaterally.   patient found to be hypertensive at triage.

## 2024-05-10 NOTE — ED ADULT NURSE NOTE - NSFALLHARMRISKINTERV_ED_ALL_ED

## 2024-05-10 NOTE — ED PROVIDER NOTE - PROGRESS NOTE DETAILS
Leigha Huggins for attending Dr. Toribio   Pt up and ambulating feels well. ED evaluation and management discussed with the patient and family (if available) in detail.  Close PMD follow up encouraged.  Strict ED return instructions discussed in detail and patient given the opportunity to ask any questions about their discharge diagnosis and instructions. Patient verbalized understanding.

## 2024-05-10 NOTE — ED ADULT NURSE NOTE - OBJECTIVE STATEMENT
63 y/o male presents to the ED c/o dizziness that started around 05:30. Pt states "I thought I was going to fall when I was peeing and my head was spinning". Pt PMHx TIA 4 years ago, and HTN. Pt denies visual/auditory changes, headache, N/V/D. Pt also reports R shoulder pain "for the last few days," and states tylenol helps the pain. No medications PTA. Cardiac monitor in place. 20G PIV in RAC. Safety and comfort maintained. 63 y/o male, A&O x 4, presents to the ED c/o dizziness that started around 05:30. Pt states "I thought I was going to fall when I was peeing", "my head was spinning". Pt PMHx HTN- elevated at triage, pt states he didn't take his BP medication this morning, and TIA 4 years ago. Pt denies visual/auditory changes, headache, N/V/D. Pt also reports R shoulder pain "for the last few days," and states Tylenol has helped. No medications PTA. Cardiac monitor in place. 20G PIV in RAC. Safety and comfort maintained.

## 2024-05-10 NOTE — ED PROVIDER NOTE - OBJECTIVE STATEMENT
63 yo male w/PMHx CAD s/p 6 stents presents to the ED with lightheadedness. States he got up to use the bathroom this morning to urinate and felt like he was going to fall over. No trauma, pt did not fall over, denies any chest pain, SOB, states symptoms are similar to how he felt when he had a TIA previously. Dr. Maldonado cardiologist.

## 2024-05-10 NOTE — ED PROVIDER NOTE - CLINICAL SUMMARY MEDICAL DECISION MAKING FREE TEXT BOX
65 yo male with CAD presents with episode of lightheadedness that has since passed. Will get ct brain, blood work, serial troponins and reassess.

## 2024-05-10 NOTE — ED PROVIDER NOTE - PATIENT PORTAL LINK FT
You can access the FollowMyHealth Patient Portal offered by [facility] by registering at the following website: [email or web address]. By joining [employer]’s FollowMyHealth portal, you will also be able to view your health information using other applications (apps) compatible with our system.

## 2024-05-10 NOTE — ED ADULT NURSE NOTE - CHIEF COMPLAINT QUOTE
ambulatory to triage complains of waking up with dizziness and lightheadedness at 0530, feels like head is spinning. alert and oriented x 4, ms strength 5/5 upper and lower ext bilaterally.   patient found to be hypertensive at triage.

## 2024-07-29 NOTE — H&P PST ADULT - NS PRO PASSIVE SMOKE EXP
Bed: B06A  Expected date:   Expected time:   Means of arrival: [facility] (14)  Comments:  45M CP/ Code STEMI   Bp 142/86, 90hr, 24rr, 98% RA  Diaphoretic   3 nitro given, 324mg aspirin, 80mcg of fentanyl   
Cath lab arrived 1536  
Cath lab paged 1519  Called cath lab 1520 (only need Dr)  Called Cath lab to activate 1525  Repaged 24/7 pager for Anh 1526  Anh called back 1527  
Left for cath lab  
Prior to pt being registered:  Pt arrived to EMS bay in v.fib. EMS shocked once while pt in ambulance (in ambulance bay) at 200J.   15:25:30 Pt arrived to ED room 6A per EMS \"I think he is in PEA.\"   15:25:45 Pt in V.fib on EMS monitor. Per MD Sellers shock pt prior to transferring pt over to ED bed. Pt shocked at 200J. Pt then in PEA.  15:25:55 Pt moved to ED bed   15:26:00 CPR started. See code narrator   
No

## 2024-09-09 VITALS — HEIGHT: 70 IN | WEIGHT: 169.76 LBS

## 2024-09-09 NOTE — H&P PST ADULT - NSICDXFAMILYHX_GEN_ALL_CORE_FT
FAMILY HISTORY:  Father  Still living? No  FH: stroke, Age at diagnosis: Age Unknown    Mother  Still living? Yes, Estimated age: 81-90  Family history of type 2 diabetes mellitus, Age at diagnosis: Age Unknown

## 2024-09-09 NOTE — H&P PST ADULT - HISTORY OF PRESENT ILLNESS
Department of Cardiology                                                                  [facility]/[place] [address] [place] [address]                                                            Telephone: [phone]. Fax:[phone]                                                                                    [facility]     Chief complaint:    Patient is a 64y old  Male who presents with a chief complaint of CAMARENA.     HPI: 65 yo male with hx of PCI LAD, and LCX syncope, diabetes HTN, HLD, CVA, carotid disease. Recent syncopal episode followed by NST . Nuclear stress test revealed lateral apical ischemia.  NL LV 60%. Now presents for [facility] in anticipation of having possible PCI.       Noninvasive Testin/4/24  EF 67%   there is a TID of the left ventricle post Stress   moderate ischemia of the lateral apical area   Risk Assessment (Low, Medium, High):     Echo (Date, Findings):   EF 60% mild hypokinesis casal inferior     Prior coronary stens LCX and LAD      Associated Risk Factors:        Cerebrovascular Disease: N/A       Chronic Lung Disease: N/A       Peripheral Arterial Disease: N/A       Chronic Kidney Disease (if yes, what is GFR): N/A       Uncontrolled Diabetes (if yes, what is HgbA1C or FBS): N/A       Poorly Controlled Hypertension (if yes, what is SBP): N/A       Morbid Obesity (if yes, what is BMI): N/A       History of Recent Ventricular Arrhythmia: N/A       Inability to Ambulate Safely: N/A       Need for Therapeutic Anticoagulation: N/A       Antiplatelet or Contrast Allergy: N/A    MEDICATIONS:                  ROS: as stated above, otherwise negative    PHYSICAL EXAM:  Constitutional: A & O x 3  HEENT:   Normal oral mucosa, PERRL, EOMI	  Cardiovascular: Normal S1 S2, No JVD, *****No murmurs  Respiratory: Lungs clear to auscultation	****  Gastrointestinal:  Soft, Non-tender, + BS	  Skin: No rashes, No ecchymoses, No cyanosis  Neurologic: Non-focal  Extremities: Normal range of motion, no edema****  Vascular: Peripheral pulses palpable + bilaterally ****    ECG:  	    LABS:	 	                                                                                                 Department of Cardiology                                                                  [facility]/[place] [address] [address]                                                            Telephone: [phone]. Fax:[phone]                                                                                    [facility]     Chief complaint:    Patient is a 64y old  Male who presents with a chief complaint of CAMARENA.     HPI: 63 yo male with hx of PCI LAD, and LCX syncope, diabetes HTN, HLD, CVA, carotid disease. Recent syncopal episode followed by NST . Nuclear stress test revealed lateral apical ischemia.  NL LV 60%. Now presents for [facility] in anticipation of having possible PCI.       Noninvasive Testin/4/24  EF 67%   there is a TID of the left ventricle post Stress   moderate ischemia of the lateral apical area   Risk Assessment (Low, Medium, High):     Echo (Date, Findings):   EF 60% mild hypokinesis casal inferior     Prior coronary stens LCX and LAD      Associated Risk Factors:        Cerebrovascular Disease: N/A       Chronic Lung Disease: N/A       Peripheral Arterial Disease: N/A       Chronic Kidney Disease (if yes, what is GFR): N/A       Uncontrolled Diabetes (if yes, what is HgbA1C or FBS): N/A       Poorly Controlled Hypertension (if yes, what is SBP): N/A       Morbid Obesity (if yes, what is BMI): N/A       History of Recent Ventricular Arrhythmia: N/A       Inability to Ambulate Safely: N/A       Need for Therapeutic Anticoagulation: N/A       Antiplatelet or Contrast Allergy: N/A    MEDICATIONS:                  ROS: as stated above, otherwise negative    PHYSICAL EXAM:  Constitutional: A & O x 3  HEENT:   Normal oral mucosa, PERRL, EOMI	  Cardiovascular: Normal S1 S2, No JVD, no murmurs  Respiratory: Lungs clear to auscultation	  Gastrointestinal:  Soft, Non-tender, + BS	  Skin: No rashes, No ecchymoses, No cyanosis  Neurologic: Non-focal  Extremities: Normal range of motion, no edema  Vascular: Peripheral pulses palpable + bilaterally    ECG:  	Sinus no acute ST abnormality     LABS:	 	                 14.9   9.50  )-----------( 211      ( 10 Sep 2024 13:27 )             44.8       09-10    141  |  107  |  18.7  ----------------------------<  172<H>  4.3   |  24.0  |  1.00    Ca    9.5      10 Sep 2024 13:27  Mg     2.2     09-10    TPro  7.0  /  Alb  4.1  /  TBili  0.3<L>  /  DBili  x   /  AST  19  /  ALT  17  /  AlkPhos  72  09-10

## 2024-09-09 NOTE — H&P PST ADULT - NSICDXPASTMEDICALHX_GEN_ALL_CORE_FT
PAST MEDICAL HISTORY:  BPH (benign prostatic hyperplasia)     CAD S/P percutaneous coronary angioplasty 7/10 5 stents and 7/14 one stent    History of hyperlipidemia     Hypertension     Neuropathy     Type 2 diabetes mellitus

## 2024-09-09 NOTE — H&P PST ADULT - NSICDXPASTSURGICALHX_GEN_ALL_CORE_FT
PAST SURGICAL HISTORY:  CAD (coronary artery disease) 6 stents in the past 5 2010 and 1 in 2014    H/O laminectomy 2015

## 2024-09-09 NOTE — H&P PST ADULT - ASSESSMENT
HPI:      Plan/Recommendations:   -plan for **** on ***  -patient seen and examined  -ECG and Labs reviewed  -NPO after midnight prior with exception of sip of water with morning medications  -Continue ***  -Hold ***  -START ****    -Intermediate risk for JOMAR, explained to pt including the possibility of worsening RFT post cath and if no recovery of RF, may need RRT/dialysis. Pt verbalized understanding. Proceed w/informed consent. Optimized renal stand point.   - JOMAR Normal Saline 250 cc   -Pre-procedure instructions provided (verbal & written)   -Supplies and verbal/written Instructions for pre-surgical chlorhexadine shower provided  -Consent to be obtained by attending electrophysiologist on the scheduled procedure date    MALLAMPATI:  ASA:  BRA:  GFR:           HPI: 65 yo male prior PCI recent near syncopal episode followed up by abnormal NST with lateral apical ischemia.       Plan/Recommendations:   -plan for  [facility]  -patient seen and examined  -ECG and Labs reviewed  -NPO after midnight prior with exception of sip of water with morning medications  -Intermediate risk for JOMAR, explained to pt including the possibility of worsening RFT post cath and if no recovery of RF, may need RRT/dialysis. Pt verbalized understanding. Proceed w/informed consent. Optimized renal stand point.   - JOMAR Normal Saline 250 cc   -Pre-procedure instructions provided (verbal & written)   -Supplies and verbal/written Instructions for pre-surgical chlorhexadine shower provided  -Consent to be obtained by attending electrophysiologist on the scheduled procedure date    MALLAMPATI: 2  ASA: 3  BRA: 1.8   GFR: 83

## 2024-09-10 ENCOUNTER — TRANSCRIPTION ENCOUNTER (OUTPATIENT)
Age: 64
End: 2024-09-10

## 2024-09-10 ENCOUNTER — INPATIENT (INPATIENT)
Facility: HOSPITAL | Age: 64
LOS: 0 days | Discharge: HOME | DRG: 316 | End: 2024-09-11
Attending: INTERNAL MEDICINE | Admitting: INTERNAL MEDICINE
Payer: MEDICARE

## 2024-09-10 ENCOUNTER — RESULT REVIEW (OUTPATIENT)
Age: 64
End: 2024-09-10

## 2024-09-10 DIAGNOSIS — I25.10 ATHEROSCLEROTIC HEART DISEASE OF NATIVE CORONARY ARTERY WITHOUT ANGINA PECTORIS: Chronic | ICD-10-CM

## 2024-09-10 DIAGNOSIS — Z98.890 OTHER SPECIFIED POSTPROCEDURAL STATES: Chronic | ICD-10-CM

## 2024-09-10 DIAGNOSIS — R94.39 ABNORMAL RESULT OF OTHER CARDIOVASCULAR FUNCTION STUDY: ICD-10-CM

## 2024-09-10 DIAGNOSIS — I25.10 ATHEROSCLEROTIC HEART DISEASE OF NATIVE CORONARY ARTERY WITHOUT ANGINA PECTORIS: ICD-10-CM

## 2024-09-10 LAB
A1C WITH ESTIMATED AVERAGE GLUCOSE RESULT: 7 % — HIGH (ref 4–5.6)
ALBUMIN SERPL ELPH-MCNC: 4.1 G/DL — SIGNIFICANT CHANGE UP (ref 3.3–5.2)
ALP SERPL-CCNC: 72 U/L — SIGNIFICANT CHANGE UP (ref 40–120)
ALT FLD-CCNC: 17 U/L — SIGNIFICANT CHANGE UP
ANION GAP SERPL CALC-SCNC: 10 MMOL/L — SIGNIFICANT CHANGE UP (ref 5–17)
APPEARANCE UR: CLEAR — SIGNIFICANT CHANGE UP
APTT BLD: 32.2 SEC — SIGNIFICANT CHANGE UP (ref 24.5–35.6)
AST SERPL-CCNC: 19 U/L — SIGNIFICANT CHANGE UP
BACTERIA # UR AUTO: NEGATIVE /HPF — SIGNIFICANT CHANGE UP
BASOPHILS # BLD AUTO: 0.03 K/UL — SIGNIFICANT CHANGE UP (ref 0–0.2)
BASOPHILS NFR BLD AUTO: 0.3 % — SIGNIFICANT CHANGE UP (ref 0–2)
BILIRUB SERPL-MCNC: 0.3 MG/DL — LOW (ref 0.4–2)
BILIRUB UR-MCNC: NEGATIVE — SIGNIFICANT CHANGE UP
BUN SERPL-MCNC: 18.7 MG/DL — SIGNIFICANT CHANGE UP (ref 8–20)
CALCIUM SERPL-MCNC: 9.5 MG/DL — SIGNIFICANT CHANGE UP (ref 8.4–10.5)
CHLORIDE SERPL-SCNC: 107 MMOL/L — SIGNIFICANT CHANGE UP (ref 96–108)
CO2 SERPL-SCNC: 24 MMOL/L — SIGNIFICANT CHANGE UP (ref 22–29)
COLOR SPEC: YELLOW — SIGNIFICANT CHANGE UP
CREAT SERPL-MCNC: 1 MG/DL — SIGNIFICANT CHANGE UP (ref 0.5–1.3)
DIFF PNL FLD: ABNORMAL
EGFR: 83 ML/MIN/1.73M2 — SIGNIFICANT CHANGE UP
EOSINOPHIL # BLD AUTO: 0.15 K/UL — SIGNIFICANT CHANGE UP (ref 0–0.5)
EOSINOPHIL NFR BLD AUTO: 1.6 % — SIGNIFICANT CHANGE UP (ref 0–6)
ESTIMATED AVERAGE GLUCOSE: 154 MG/DL — HIGH (ref 68–114)
GLUCOSE SERPL-MCNC: 172 MG/DL — HIGH (ref 70–99)
GLUCOSE UR QL: >=1000 MG/DL
HCT VFR BLD CALC: 44.8 % — SIGNIFICANT CHANGE UP (ref 39–50)
HGB BLD-MCNC: 14.9 G/DL — SIGNIFICANT CHANGE UP (ref 13–17)
IMM GRANULOCYTES NFR BLD AUTO: 0.3 % — SIGNIFICANT CHANGE UP (ref 0–0.9)
INR BLD: 0.95 RATIO — SIGNIFICANT CHANGE UP (ref 0.85–1.18)
KETONES UR-MCNC: 15 MG/DL
LEUKOCYTE ESTERASE UR-ACNC: ABNORMAL
LYMPHOCYTES # BLD AUTO: 1.03 K/UL — SIGNIFICANT CHANGE UP (ref 1–3.3)
LYMPHOCYTES # BLD AUTO: 10.8 % — LOW (ref 13–44)
MAGNESIUM SERPL-MCNC: 2.2 MG/DL — SIGNIFICANT CHANGE UP (ref 1.6–2.6)
MCHC RBC-ENTMCNC: 29.8 PG — SIGNIFICANT CHANGE UP (ref 27–34)
MCHC RBC-ENTMCNC: 33.3 GM/DL — SIGNIFICANT CHANGE UP (ref 32–36)
MCV RBC AUTO: 89.6 FL — SIGNIFICANT CHANGE UP (ref 80–100)
MONOCYTES # BLD AUTO: 0.54 K/UL — SIGNIFICANT CHANGE UP (ref 0–0.9)
MONOCYTES NFR BLD AUTO: 5.7 % — SIGNIFICANT CHANGE UP (ref 2–14)
NEUTROPHILS # BLD AUTO: 7.72 K/UL — HIGH (ref 1.8–7.4)
NEUTROPHILS NFR BLD AUTO: 81.3 % — HIGH (ref 43–77)
NITRITE UR-MCNC: NEGATIVE — SIGNIFICANT CHANGE UP
NT-PROBNP SERPL-SCNC: 231 PG/ML — SIGNIFICANT CHANGE UP (ref 0–300)
PH UR: 5.5 — SIGNIFICANT CHANGE UP (ref 5–8)
PLATELET # BLD AUTO: 211 K/UL — SIGNIFICANT CHANGE UP (ref 150–400)
POTASSIUM SERPL-MCNC: 4.3 MMOL/L — SIGNIFICANT CHANGE UP (ref 3.5–5.3)
POTASSIUM SERPL-SCNC: 4.3 MMOL/L — SIGNIFICANT CHANGE UP (ref 3.5–5.3)
PREALB SERPL-MCNC: 23 MG/DL — SIGNIFICANT CHANGE UP (ref 18–38)
PROT SERPL-MCNC: 7 G/DL — SIGNIFICANT CHANGE UP (ref 6.6–8.7)
PROT UR-MCNC: SIGNIFICANT CHANGE UP MG/DL
PROTHROM AB SERPL-ACNC: 10.6 SEC — SIGNIFICANT CHANGE UP (ref 9.5–13)
RBC # BLD: 5 M/UL — SIGNIFICANT CHANGE UP (ref 4.2–5.8)
RBC # FLD: 15.5 % — HIGH (ref 10.3–14.5)
RBC CASTS # UR COMP ASSIST: 17 /HPF — HIGH (ref 0–4)
SODIUM SERPL-SCNC: 141 MMOL/L — SIGNIFICANT CHANGE UP (ref 135–145)
SP GR SPEC: >1.03 — HIGH (ref 1–1.03)
SQUAMOUS # UR AUTO: 1 /HPF — SIGNIFICANT CHANGE UP (ref 0–5)
TSH SERPL-MCNC: 2.59 UIU/ML — SIGNIFICANT CHANGE UP (ref 0.27–4.2)
UROBILINOGEN FLD QL: 0.2 MG/DL — SIGNIFICANT CHANGE UP (ref 0.2–1)
WBC # BLD: 9.5 K/UL — SIGNIFICANT CHANGE UP (ref 3.8–10.5)
WBC # FLD AUTO: 9.5 K/UL — SIGNIFICANT CHANGE UP (ref 3.8–10.5)
WBC UR QL: 68 /HPF — HIGH (ref 0–5)
YEAST-LIKE CELLS: PRESENT

## 2024-09-10 PROCEDURE — 93306 TTE W/DOPPLER COMPLETE: CPT | Mod: 26

## 2024-09-10 PROCEDURE — 93880 EXTRACRANIAL BILAT STUDY: CPT | Mod: 26

## 2024-09-10 PROCEDURE — 93010 ELECTROCARDIOGRAM REPORT: CPT

## 2024-09-10 RX ORDER — EZETIMIBE 10 MG/1
10 TABLET ORAL DAILY
Refills: 0 | Status: DISCONTINUED | OUTPATIENT
Start: 2024-09-11 | End: 2024-09-11

## 2024-09-10 RX ORDER — SODIUM CHLORIDE 9 MG/ML
3 INJECTION INTRAMUSCULAR; INTRAVENOUS; SUBCUTANEOUS EVERY 8 HOURS
Refills: 0 | Status: DISCONTINUED | OUTPATIENT
Start: 2024-09-10 | End: 2024-09-11

## 2024-09-10 RX ORDER — DAPAGLIFLOZIN 10 MG/1
10 TABLET, FILM COATED ORAL DAILY
Refills: 0 | Status: DISCONTINUED | OUTPATIENT
Start: 2024-09-11 | End: 2024-09-11

## 2024-09-10 RX ORDER — METOPROLOL TARTRATE 100 MG/1
25 TABLET ORAL
Refills: 0 | Status: DISCONTINUED | OUTPATIENT
Start: 2024-09-11 | End: 2024-09-11

## 2024-09-10 RX ORDER — ASPIRIN 81 MG
81 TABLET, DELAYED RELEASE (ENTERIC COATED) ORAL DAILY
Refills: 0 | Status: DISCONTINUED | OUTPATIENT
Start: 2024-09-10 | End: 2024-09-11

## 2024-09-10 RX ORDER — TAMSULOSIN HYDROCHLORIDE 0.4 MG/1
0.4 CAPSULE ORAL AT BEDTIME
Refills: 0 | Status: DISCONTINUED | OUTPATIENT
Start: 2024-09-10 | End: 2024-09-11

## 2024-09-10 RX ORDER — LISINOPRIL 10 MG/1
40 TABLET ORAL DAILY
Refills: 0 | Status: DISCONTINUED | OUTPATIENT
Start: 2024-09-11 | End: 2024-09-11

## 2024-09-10 RX ORDER — DULOXETINE HCL 30 MG
30 CAPSULE,DELAYED RELEASE (ENTERIC COATED) ORAL DAILY
Refills: 0 | Status: DISCONTINUED | OUTPATIENT
Start: 2024-09-11 | End: 2024-09-11

## 2024-09-10 RX ADMIN — TAMSULOSIN HYDROCHLORIDE 0.4 MILLIGRAM(S): 0.4 CAPSULE ORAL at 22:54

## 2024-09-10 RX ADMIN — Medication 40 MILLIGRAM(S): at 22:55

## 2024-09-10 RX ADMIN — Medication 81 MILLIGRAM(S): at 15:27

## 2024-09-10 RX ADMIN — Medication 75 MILLIGRAM(S): at 15:27

## 2024-09-10 NOTE — DISCHARGE NOTE PROVIDER - HOSPITAL COURSE
HPI: 63 yo male with hx of PCI LAD, and LCX syncope, diabetes HTN, HLD, CVA, carotid disease. Recent syncopal episode followed by NST . Nuclear stress test revealed lateral apical ischemia.  NL LV 60%.       Post Cath Narrative:  Diagnostic Conclusions:   Normal LVEF   Severe Left Main and 3 vessel disease   Recommendations:   CABG   Acute complication:    No complications   Procedure Narrative:   The risks and alternatives of the procedures and conscious sedation  were explained to the patient and informed consent was  obtained. The patient was brought to the cath lab and placed on the  exam table.  Access   Right radial artery:   The puncture site was infiltrated with 2% Lidocaine. Vascular access  was obtained using modified seldinger technique.  Hemostasis/Sheath Status: Hemostasis was successful using mechanical  compression.  Coronary Angiography   Left Coronary System:   A catheter was positioned into the vessel ostia under fluoroscopic  guidance. Contrast injections were performed using power  injection. Angiograms were obtained in multiple views.   Right Coronary System:   A catheter was positioned into the vessel ostium under fluoroscopic  guidance. Contrast injections were performed using power  injection. Angiograms were obtained in multiple views.     Diagnostic Findings:   Coronary Angiography   The coronary circulation is right dominant.   LM   Distal left main: There is an 80 % stenosis.    LAD   Ostial left anterior descending: There is an 80 % stenosis. Proximal  left anterior descending: The previously placed stent is a  drug-eluting stent and is patent. Mid left anterior descending: There  is a 90 % in-stent restenosis.  CX   Ostial circumflex: There is a 90 % stenosis. Second obtuse marginal:  There is a 90 % stenosis.  RCA   Proximal right coronary artery: The distal vessel is supplied by  moderate collaterals from the LAD septal  segments.  There is a 100 % stenosis.    Left Heart Cath   Left ventricular function was assessed and demonstrates normal LV wall  motion. Global left ventricular function is normal.  Ejection fraction was calculated with a value of 70%. LV to AO  pullback was performed and there is no pressure gradient. LHC  performed: Aortic valve crossed and left ventricular pressures were  obtained.  Valves   Aortic Valve: There is no aortic valve stenosis.    Mitral Valve: The mitral valve exhibits trivial (less than 1+)  regurgitation.     S/P left heart catheterization via RRA LM 80%, LAd 80%, LCX 90%, RCA moderate collaterals with , LV 70%.  Plan for CTS consult with Dr Figueroa  pre op testing to be done with possible Dc in the am prior to OHS.   Continue Plavix and asa    -HYDRATION POST PCI 250CC NS X 1

## 2024-09-10 NOTE — DISCHARGE NOTE NURSING/CASE MANAGEMENT/SOCIAL WORK - PATIENT PORTAL LINK FT
You can access the FollowMyHealth Patient Portal offered by [facility] by registering at the following website: [email or web address]. By joining [employer]’s FollowMyHealth portal, you will also be able to view your health information using other applications (apps) compatible with our system.

## 2024-09-10 NOTE — DISCHARGE NOTE PROVIDER - NSDCACTIVITY_GEN_ALL_CORE
Restricted use with no heavy lifting of affected arm for 48 hours.  No submerging the arm in water for 48 hours.  You may start showering today.  Call your doctor for any bleeding, swelling, loss of sensation in the hand or fingers, or fingers turning blue.  If heavy bleeding or large lumps form, hold pressure at the spot and come to the Emergency Room.      No Metformin till 9/12/24 Restricted use with no heavy lifting of affected arm for 48 hours.  No submerging the arm in water for 48 hours.  You may start showering today.  Call your doctor for any bleeding, swelling, loss of sensation in the hand or fingers, or fingers turning blue.  If heavy bleeding or large lumps form, hold pressure at the spot and come to the Emergency Room.      No Metformin till 9/12/24/No restrictions

## 2024-09-10 NOTE — DISCHARGE NOTE PROVIDER - NSDCCPCAREPLAN_GEN_ALL_CORE_FT
PRINCIPAL DISCHARGE DIAGNOSIS  Diagnosis: 3-vessel CAD  Assessment and Plan of Treatment: No metformin till 9/12/24   follow up with Dr Figueroa for CTS.

## 2024-09-10 NOTE — PROGRESS NOTE ADULT - SUBJECTIVE AND OBJECTIVE BOX
Department of Cardiology                                                                  [facility]/[place] [address] [place] [id number]                                                            Telephone: [phone]. Fax:[phone]                                                    Post- Procedure Note: Left Heart Cardiac Catheterization                                                            Chief complaint:    Patient is a 64y old  Male who presents with a chief complaint of CAMARENA.     HPI: 63 yo male with hx of PCI LAD, and LCX syncope, diabetes HTN, HLD, CVA, carotid disease. Recent syncopal episode followed by NST . Nuclear stress test revealed lateral apical ischemia.  NL LV 60%.       Post Cath Narrative:  Diagnostic Conclusions:   Normal LVEF   Severe Left Main and 3 vessel disease   Recommendations:   CABG   Acute complication:    No complications   Procedure Narrative:   The risks and alternatives of the procedures and conscious sedation  were explained to the patient and informed consent was  obtained. The patient was brought to the cath lab and placed on the  exam table.  Access   Right radial artery:   The puncture site was infiltrated with 2% Lidocaine. Vascular access  was obtained using modified seldinger technique.  Hemostasis/Sheath Status: Hemostasis was successful using mechanical  compression.  Coronary Angiography   Left Coronary System:   A catheter was positioned into the vessel ostia under fluoroscopic  guidance. Contrast injections were performed using power  injection. Angiograms were obtained in multiple views.   Right Coronary System:   A catheter was positioned into the vessel ostium under fluoroscopic  guidance. Contrast injections were performed using power  injection. Angiograms were obtained in multiple views.     Diagnostic Findings:   Coronary Angiography   The coronary circulation is right dominant.   LM   Distal left main: There is an 80 % stenosis.    LAD   Ostial left anterior descending: There is an 80 % stenosis. Proximal  left anterior descending: The previously placed stent is a  drug-eluting stent and is patent. Mid left anterior descending: There  is a 90 % in-stent restenosis.  CX   Ostial circumflex: There is a 90 % stenosis. Second obtuse marginal:  There is a 90 % stenosis.  RCA   Proximal right coronary artery: The distal vessel is supplied by  moderate collaterals from the LAD septal  segments.  There is a 100 % stenosis.    Left Heart Cath   Left ventricular function was assessed and demonstrates normal LV wall  motion. Global left ventricular function is normal.  Ejection fraction was calculated with a value of 70%. LV to AO  pullback was performed and there is no pressure gradient. LHC  performed: Aortic valve crossed and left ventricular pressures were  obtained.  Valves   Aortic Valve: There is no aortic valve stenosis.    Mitral Valve: The mitral valve exhibits trivial (less than 1+)  regurgitation.     Noninvasive Testin/4/24  EF 67%   there is a TID of the left ventricle post Stress   moderate ischemia of the lateral apical area     Echo (Date, Findings):   EF 60% mild hypokinesis casal inferior     Prior coronary stens LCX and LAD,  of RCA     PHYSICAL EXAM:  Constitutional: A & O x 3  HEENT:   Normal oral mucosa, PERRL, EOMI	  Cardiovascular: Normal S1 S2, No JVD, no murmurs  Respiratory: Lungs clear to auscultation	  Gastrointestinal:  Soft, Non-tender, + BS	  Skin: No rashes, No ecchymoses, No cyanosis  Neurologic: Non-focal  Extremities: Normal range of motion, no edema  Vascular: Peripheral pulses palpable + bilaterally    ECG:  	Sinus no acute ST abnormality     LABS:	 	                 14.9   9.50  )-----------( 211      ( 10 Sep 2024 13:27 )             44.8       09-10    141  |  107  |  18.7  ----------------------------<  172<H>  4.3   |  24.0  |  1.00    Ca    9.5      10 Sep 2024 13:27  Mg     2.2     09-10    TPro  7.0  /  Alb  4.1  /  TBili  0.3<L>  /  DBili  x   /  AST  19  /  ALT  17  /  AlkPhos  72  09-10        Home Medications:  aspirin 81 mg oral tablet: 1 tab(s) orally once a day (10 Sep 2024 13:50)  atorvastatin 40 mg oral tablet: 1 tab(s) orally once a day (10 Sep 2024 13:50)  clopidogrel 75 mg oral tablet: 1 tab(s) orally once a day. RESTART 17 (10 Sep 2024 13:50)  DULoxetine 30 mg oral delayed release capsule:  orally once daily (10 Sep 2024 13:50)  ezetimibe 10 mg oral tablet: 1 tab(s) orally once a day (10 Sep 2024 13:50)  Farxiga 10 mg oral tablet: 1 tab(s) orally once a day (10 Sep 2024 13:50)  Fish Oil 1000 mg oral capsule:  orally once daily (10 Sep 2024 13:50)  gabapentin 300 mg oral tablet: orally 2 times a day (10 Sep 2024 13:50)  lisinopril 40 mg oral tablet: 1 tab(s) orally once a day (10 Sep 2024 13:50)  metFORMIN 1000 mg oral tablet: 1 tab(s) orally 2 times a day (10 Sep 2024 13:50)  multivitamin:   once daily (10 Sep 2024 13:50)  Nebivolol 20 mg oral tablet: 1 tab(s) orally once a day (10 Sep 2024 13:50)  slow release iron: daily (10 Sep 2024 13:50)  tamsulosin 0.4 mg oral capsule: 1 cap(s) orally once a day (10 Sep 2024 13:50)    shellfish (Rash)  No Known Drug Allergies  shellfish (Unknown)  Originally Entered as [Unknown] reaction to [SEASONAL ALLERG] (Unknown)  Originally Entered as [Unknown] reaction to [IES] (Unknown)    Objective:  Vital Signs Last 24 Hrs  T(C): 36.8 (10 Sep 2024 13:51), Max: 36.8 (10 Sep 2024 13:51)  T(F): 98.3 (10 Sep 2024 13:51), Max: 98.3 (10 Sep 2024 13:51)  HR: 61 (10 Sep 2024 17:55) (61 - 74)  BP: 145/99 (10 Sep 2024 17:40) (139/84 - 161/78)  BP(mean): --  RR: 16 (10 Sep 2024 17:55) (16 - 20)  SpO2: 97% (10 Sep 2024 17:55) (96% - 99%)                            14.9   9.50  )-----------( 211      ( 10 Sep 2024 13:27 )             44.8     09-10    141  |  107  |  18.7  ----------------------------<  172<H>  4.3   |  24.0  |  1.00    Ca    9.5      10 Sep 2024 13:27  Mg     2.2     09-10    TPro  7.0  /  Alb  4.1  /  TBili  0.3<L>  /  DBili  x   /  AST  19  /  ALT  17  /  AlkPhos  72  09-10    PT/INR - ( 10 Sep 2024 13:27 )   PT: 10.6 sec;   INR: 0.95 ratio         PTT - ( 10 Sep 2024 13:27 )  PTT:32.2 sec    S/P left heart catheterization via RRA LM 80%, LAd 80%, LCX 90%, RCA moderate collaterals with , LV 70%.  Plan for CTS consult with Dr Figueroa  pre op testing to be done with possible Dc in the am prior to OHS.   Continue Plavix and asa    -HYDRATION POST PCI 250CC NS X 1

## 2024-09-10 NOTE — CONSULT NOTE ADULT - PROBLEM SELECTOR RECOMMENDATION 9
S/p LHC revealing MVD   Preop work up ordered including carotid US to assess for carotid stenosis, PFTs to eval lung function, TTE to eval EF / WMA / Valve function, and lab work including TSH, prealbumin, Hgb A1C, P2Y12, BNP, T&S, MRSA/MSSA, and UA.   Recommend ASA, Lipitor, BB in perioperative setting.   Needs vein mapping to assess for adequate conduit.   Plan likely for outpatient CABG eval.   CT surgery will follow.   Plan discussed with Dr. Figueroa S/p LHC revealing MVD   Preop work up ordered including carotid US to assess for carotid stenosis, PFTs to eval lung function, TTE to eval EF / WMA / Valve function, and lab work including TSH, prealbumin, Hgb A1C, P2Y12, BNP, T&S, MRSA/MSSA, and UA.   Preop work up testing to be complete tonight before patient is discharged.   Pt wishes to go home and return as an outpatient for CABG procedure.   Dr. Figueroa's office will call patient to schedule appointment for vein mapping in Dr. Figueroa's office & to give surgical procedure date.   BB, Lipitor, & ASA in perioperative setting.    Plan discussed with Dr. Figueroa

## 2024-09-10 NOTE — CHART NOTE - NSCHARTNOTEFT_GEN_A_CORE
Patient completed preliminary CTS study (Carotid US).  Will follow w/ Dr. Figueroa's team outpatient this week for LE US.  Case d/w Dr. Hagan and patient stable for discharge tonight.  Also discussed plan with patient and daughter over the phone in detail.

## 2024-09-10 NOTE — CONSULT NOTE ADULT - SUBJECTIVE AND OBJECTIVE BOX
Consult for surgeon:  [place]ina    HPI:  64y Male with PMHx HTN, DM, HLD, CVA, carotid disease, PCI LAD and LCX. Pt had a recent syncopal episode which precipitated a nuclear stress test. Was found to have an abnormal NST and sent in for an elective LHC today with Dr. Hagan. LHC revealed MVD (cath report pending). CT surgery consulted for CABG eval.   Patient denies acute pain with radiating or aggravating factors. He denies chest pain, shortness of breath, palpitations, headache, dizziness, nausea, or vomiting.     Review of systems: All other ROS negative except those mentioned above.       PAST MEDICAL & SURGICAL HISTORY:  Hypertension    hyperlipidemia    CAD S/P percutaneous coronary angioplasty  7/10 5 stents and 7/14 one stent    Neuropathy    Type 2 diabetes mellitus    BPH (benign prostatic hyperplasia)    CAD (coronary artery disease)  6 stents in the past 5 2010 and 1 in 2014    H/O laminectomy  2015      Allergies:  shellfish (Rash)  No Known Drug Allergies  shellfish (Unknown)  Originally Entered as [Unknown] reaction to [SEASONAL ALLERG] (Unknown)  Originally Entered as [Unknown] reaction to [IES] (Unknown)    Social History:  Smoking        -Never smoked  -Former/Active smoker, smoked ciagrettes/cigars ___ PPD x ___ years, quit ____ years ago    Alcohol  -No active alcohol conusmption   -Consumes alcohol daily/socially, ___ drinks, ___ CAGE    Illicit Drug use  -No illicit drug use   -Former/Active ____________ drug use, last used ______________     Occupation   -Currently employed as a _______________  -Retired/Former ________________    Residence  Private home with spouse/family  Assisted living facility- ______________  Nursing Home- ________________  Rehab- _____________    ADLs  Drives [yes]   [no]  Ambulation   [no assistance]   [cane]    [walker]   [wheelchair]  Stairs in home?  [no]   [yes, how many?]    Family History  Mother- [_] DM   [_] CAD   [_] MI < 55  Father- [_] DM    [_] CAD   [_] MI < 55    Physical Exam  T(F): 98.3 (09-10-24 @ 13:51)  HR: 61 (09-10-24 @ 17:55)  BP: 145/99 (09-10-24 @ 17:40)  RR: 16 (09-10-24 @ 17:55)  SpO2: 97% (09-10-24 @ 17:55)    General: NAD  Neurology: Awake, nonfocal, FREEMAN x 4  Eyes: Scleras clear, PERRLA/ EOMI, Gross vision intact  ENT:Gross hearing intact, grossly patent pharynx, no stridor  Neck: Neck supple, trachea midline, No JVD  Respiratory: CTA B/L, No wheezing, rales, rhonchi  CV: RRR, S1S2, no murmurs, rubs or gallops  Abdominal: Soft, NT, ND +BS,   Extremities: No edema, + peripheral pulses  Skin: No Rashes, Hematoma, Ecchymosis                           14.9   9.50  )-----------( 211      ( 10 Sep 2024 13:27 )             44.8     09-10    141  |  107  |  18.7  ----------------------------<  172<H>  4.3   |  24.0  |  1.00    Ca    9.5      10 Sep 2024 13:27  Mg     2.2     09-10    TPro  7.0  /  Alb  4.1  /  TBili  0.3<L>  /  DBili  x   /  AST  19  /  ALT  17  /  AlkPhos  72  09-10    PT/INR - ( 10 Sep 2024 13:27 )   PT: 10.6 sec;   INR: 0.95 ratio         PTT - ( 10 Sep 2024 13:27 )  PTT:32.2 sec  Urinalysis Basic - ( 10 Sep 2024 13:27 )    Color: x / Appearance: x / SG: x / pH: x  Gluc: 172 mg/dL / Ketone: x  / Bili: x / Urobili: x   Blood: x / Protein: x / Nitrite: x   Leuk Esterase: x / RBC: x / WBC x   Sq Epi: x / Non Sq Epi: x / Bacteria: x    Imaging    TTE- pending    Cath- full report pending     Carotid U/S- pending    PFTs- pending         Consult for surgeon:  [place]    HPI:  64y Male with PMHx HTN, DM, HLD, CVA, carotid disease, PCI LAD and LCX in 2006. Pt had a recent syncopal episode which precipitated an outpatient nuclear stress test. Was found to have an abnormal NST and sent in for an elective LHC today with Dr. Hagan. Pt reports he has experienced some SOB with walking for the past month. LHC revealed MVD ( LM 80%, LAd 80%, LCX 90%, RCA moderate collaterals with , LV 70%). CT surgery consulted for CABG eval.   Patient denies acute pain with radiating or aggravating factors. He denies chest pain, palpitations, headache, nausea, or vomiting.     Review of systems: All other ROS negative except those mentioned above.       PAST MEDICAL & SURGICAL HISTORY:  Hypertension    hyperlipidemia    CAD S/P percutaneous coronary angioplasty  7/10 5 stents and 7/14 one stent    Neuropathy    Type 2 diabetes mellitus    BPH (benign prostatic hyperplasia)    CAD (coronary artery disease)  6 stents in the past 5 2010 and 1 in 2014    H/O laminectomy  2015      Allergies:  shellfish (Rash)  No Known Drug Allergies  shellfish (Unknown)  Originally Entered as [Unknown] reaction to [SEASONAL ALLERG] (Unknown)  Originally Entered as [Unknown] reaction to [IES] (Unknown)    Social History:  Smoking        -Never smoked    Alcohol  -No active alcohol consumption     Illicit Drug use  -No illicit drug use     Occupation   -Retired this year    Residence  Private home with daughter and grandaughter    ADLs  Drives [yes]    Ambulation   [no assistance]     Stairs in home? yes, lives on the second floor of a home. 14 steps to get into second floor.     Family History  Mother- [X_] DM   [_] CAD   [_] MI < 55  Father- [_] DM    [_] CAD   [_] MI < 55    Physical Exam  T(F): 98.3 (09-10-24 @ 13:51)  HR: 61 (09-10-24 @ 17:55)  BP: 145/99 (09-10-24 @ 17:40)  RR: 16 (09-10-24 @ 17:55)  SpO2: 97% (09-10-24 @ 17:55)    General: NAD  Neurology: Awake, nonfocal, FREEMAN x 4  Eyes: Scleras clear, PERRLA/ EOMI, Gross vision intact  ENT: Gross hearing intact, grossly patent pharynx, no stridor  Neck: Neck supple, trachea midline, No JVD  Respiratory: CTA B/L, No wheezing, rales, rhonchi  CV: RRR, S1S2, no murmurs, rubs or gallops  Abdominal: Soft, NT, ND +BS  Extremities: No edema, + peripheral pulses  Skin: No Rashes, Hematoma, Ecchymosis                           14.9   9.50  )-----------( 211      ( 10 Sep 2024 13:27 )             44.8     09-10    141  |  107  |  18.7  ----------------------------<  172<H>  4.3   |  24.0  |  1.00    Ca    9.5      10 Sep 2024 13:27  Mg     2.2     09-10    TPro  7.0  /  Alb  4.1  /  TBili  0.3<L>  /  DBili  x   /  AST  19  /  ALT  17  /  AlkPhos  72  09-10    PT/INR - ( 10 Sep 2024 13:27 )   PT: 10.6 sec;   INR: 0.95 ratio         PTT - ( 10 Sep 2024 13:27 )  PTT:32.2 sec  Urinalysis Basic - ( 10 Sep 2024 13:27 )    Color: x / Appearance: x / SG: x / pH: x  Gluc: 172 mg/dL / Ketone: x  / Bili: x / Urobili: x   Blood: x / Protein: x / Nitrite: x   Leuk Esterase: x / RBC: x / WBC x   Sq Epi: x / Non Sq Epi: x / Bacteria: x    Imaging    TTE- pending    Cath-     < from: Cardiac Catheterization (09.10.24 @ 16:52) >  Diagnostic Findings:     Coronary Angiography   The coronary circulation is right dominant.      Patient: LAURIE SIMON             MRN: [id number]  Study Date: 09/10/2024   04:52 PM      Page 1 of 4      LM   Distal left main: There is an 80 % stenosis.      LAD   Ostial left anterior descending: There is an 80 % stenosis. Proximal  left anterior descending: The previously placed stent is a  drug-eluting stent and is patent. Mid left anterior descending: There  is a 90 % in-stent restenosis.    CX   Ostial circumflex: There is a 90 % stenosis. Second obtuse marginal:  There is a 90 % stenosis.    RCA   Proximal right coronary artery: The distal vessel is supplied by  moderate collaterals from the LAD septal  segments.  There is a 100 % stenosis.      Left Heart Cath   Left ventricular function was assessed and demonstrates normal LV wall  motion. Global left ventricular function is normal.  Ejection fraction was calculated with a value of 70%. LV to AO  pullback was performed and there is no pressure gradient. LHC  performed: Aortic valve crossed and left ventricular pressures were  obtained.    < end of copied text >       Carotid U/S- pending    PFTs- pending

## 2024-09-10 NOTE — DISCHARGE NOTE PROVIDER - CARE PROVIDER_API CALL
Chandrakant Figueroa  Thoracic and Cardiac Surgery  [address] [place] [id number]  Phone: [phone]  Fax: [phone]  Follow Up Time:

## 2024-09-10 NOTE — DISCHARGE NOTE PROVIDER - NSDCCPTREATMENT_GEN_ALL_CORE_FT
PRINCIPAL PROCEDURE  Procedure: Coronary angiogram  Findings and Treatment: Restricted use with no heavy lifting of affected arm for 48 hours.  No submerging the arm in water for 48 hours.  You may start showering today.  Call your doctor for any bleeding, swelling, loss of sensation in the hand or fingers, or fingers turning blue.  If heavy bleeding or large lumps form, hold pressure at the spot and come to the Emergency Room.

## 2024-09-10 NOTE — DISCHARGE NOTE NURSING/CASE MANAGEMENT/SOCIAL WORK - NSDCPEFALRISK_GEN_ALL_CORE
For information on Fall & Injury Prevention, visit: [email or web address] OR  [email or web address] OR  https://www.cdc.gov/steadi/patient.html

## 2024-09-10 NOTE — DISCHARGE NOTE PROVIDER - NSDCMRMEDTOKEN_GEN_ALL_CORE_FT
aspirin 81 mg oral tablet: 1 tab(s) orally once a day  atorvastatin 40 mg oral tablet: 1 tab(s) orally once a day  clopidogrel 75 mg oral tablet: 1 tab(s) orally once a day. RESTART 5/4/17  DULoxetine 30 mg oral delayed release capsule:  orally once daily  ezetimibe 10 mg oral tablet: 1 tab(s) orally once a day  Farxiga 10 mg oral tablet: 1 tab(s) orally once a day  Fish Oil 1000 mg oral capsule:  orally once daily  gabapentin 300 mg oral tablet: orally 2 times a day  lisinopril 40 mg oral tablet: 1 tab(s) orally once a day  metFORMIN 1000 mg oral tablet: 1 tab(s) orally 2 times a day  multivitamin:   once daily  Nebivolol 20 mg oral tablet: 1 tab(s) orally once a day  slow release iron: daily  tamsulosin 0.4 mg oral capsule: 1 cap(s) orally once a day

## 2024-09-10 NOTE — CONSULT NOTE ADULT - ASSESSMENT
64y Male with PMHx HTN, DM, HLD, CVA, carotid disease, PCI LAD and LCX. Pt had a recent syncopal episode which precipitated a nuclear stress test. Was found to have an abnormal NST and sent in for an elective LHC today with Dr. Hagan. LHC revealed MVD (cath report pending). CT surgery consulted for CABG eval.  64y Male with PMHx HTN, DM, HLD, CVA, carotid disease, PCI LAD and LCX in 2006. Pt had a recent syncopal episode which precipitated an outpatient nuclear stress test. Was found to have an abnormal NST and sent in for an elective LHC today with Dr. Hagan. Pt reports he has experienced some SOB with walking for the past month. LHC revealed MVD ( LM 80%, LAd 80%, LCX 90%, RCA moderate collaterals with , LV 70%). CT surgery consulted for CABG eval.

## 2024-09-11 VITALS
SYSTOLIC BLOOD PRESSURE: 155 MMHG | TEMPERATURE: 98 F | HEART RATE: 70 BPM | DIASTOLIC BLOOD PRESSURE: 78 MMHG | OXYGEN SATURATION: 96 % | RESPIRATION RATE: 18 BRPM

## 2024-09-11 DIAGNOSIS — E13.9 OTHER SPECIFIED DIABETES MELLITUS W/OUT COMPLICATIONS: ICD-10-CM

## 2024-09-11 DIAGNOSIS — Z86.39 PERSONAL HISTORY OF OTHER ENDOCRINE, NUTRITIONAL AND METABOLIC DISEASE: ICD-10-CM

## 2024-09-11 DIAGNOSIS — Z78.9 OTHER SPECIFIED HEALTH STATUS: ICD-10-CM

## 2024-09-11 DIAGNOSIS — N40.0 BENIGN PROSTATIC HYPERPLASIA WITHOUT LOWER URINARY TRACT SYMPMS: ICD-10-CM

## 2024-09-11 DIAGNOSIS — I25.10 ATHEROSCLEROTIC HEART DISEASE OF NATIVE CORONARY ARTERY W/OUT ANGINA PECTORIS: ICD-10-CM

## 2024-09-11 DIAGNOSIS — I65.29 OCCLUSION AND STENOSIS OF UNSPECIFIED CAROTID ARTERY: ICD-10-CM

## 2024-09-11 DIAGNOSIS — Z83.3 FAMILY HISTORY OF DIABETES MELLITUS: ICD-10-CM

## 2024-09-11 DIAGNOSIS — I10 ESSENTIAL (PRIMARY) HYPERTENSION: ICD-10-CM

## 2024-09-11 DIAGNOSIS — E11.8 TYPE 2 DIABETES MELLITUS WITH UNSPECIFIED COMPLICATIONS: ICD-10-CM

## 2024-09-11 LAB
MRSA PCR RESULT.: SIGNIFICANT CHANGE UP
S AUREUS DNA NOSE QL NAA+PROBE: SIGNIFICANT CHANGE UP

## 2024-09-12 ENCOUNTER — APPOINTMENT (OUTPATIENT)
Dept: CARDIOTHORACIC SURGERY | Facility: CLINIC | Age: 64
End: 2024-09-12
Payer: MEDICARE

## 2024-09-12 VITALS
RESPIRATION RATE: 16 BRPM | BODY MASS INDEX: 25.77 KG/M2 | SYSTOLIC BLOOD PRESSURE: 125 MMHG | WEIGHT: 180 LBS | HEART RATE: 63 BPM | TEMPERATURE: 97.9 F | HEIGHT: 70 IN | DIASTOLIC BLOOD PRESSURE: 78 MMHG | OXYGEN SATURATION: 97 %

## 2024-09-12 PROBLEM — I10 BENIGN ESSENTIAL HYPERTENSION: Status: RESOLVED | Noted: 2024-09-11 | Resolved: 2024-09-12

## 2024-09-12 PROBLEM — E13.9 DIABETES MELLITUS OF OTHER TYPE WITHOUT COMPLICATION: Status: ACTIVE | Noted: 2024-09-11

## 2024-09-12 PROBLEM — E11.8 TYPE 2 DIABETES WITH COMPLICATION: Status: RESOLVED | Noted: 2024-09-12 | Resolved: 2024-09-12

## 2024-09-12 PROBLEM — I25.10 ARTERIOSCLEROTIC CORONARY ARTERY DISEASE: Status: ACTIVE | Noted: 2024-09-11

## 2024-09-12 PROBLEM — Z86.39 HISTORY OF HYPERLIPIDEMIA: Status: RESOLVED | Noted: 2024-09-11 | Resolved: 2024-09-12

## 2024-09-12 PROBLEM — Z83.3 FAMILY HISTORY OF TYPE 2 DIABETES MELLITUS: Status: ACTIVE | Noted: 2024-09-12

## 2024-09-12 PROCEDURE — 99204 OFFICE O/P NEW MOD 45 MIN: CPT

## 2024-09-12 RX ORDER — DAPAGLIFLOZIN 10 MG/1
10 TABLET, FILM COATED ORAL
Refills: 0 | Status: ACTIVE | COMMUNITY

## 2024-09-12 RX ORDER — EZETIMIBE 10 MG/1
10 TABLET ORAL
Refills: 0 | Status: ACTIVE | COMMUNITY

## 2024-09-12 RX ORDER — PSYLLIUM HUSK 0.4 G
CAPSULE ORAL
Refills: 0 | Status: ACTIVE | COMMUNITY

## 2024-09-12 RX ORDER — ATORVASTATIN CALCIUM 40 MG/1
40 TABLET, FILM COATED ORAL
Refills: 0 | Status: ACTIVE | COMMUNITY

## 2024-09-12 RX ORDER — GABAPENTIN 300 MG/1
300 CAPSULE ORAL
Refills: 0 | Status: ACTIVE | COMMUNITY

## 2024-09-12 RX ORDER — NEBIVOLOL 20 MG/1
20 TABLET ORAL
Refills: 0 | Status: ACTIVE | COMMUNITY

## 2024-09-12 RX ORDER — CLOPIDOGREL BISULFATE 75 MG/1
75 TABLET, FILM COATED ORAL
Refills: 0 | Status: ACTIVE | COMMUNITY

## 2024-09-12 RX ORDER — TAMSULOSIN HYDROCHLORIDE 0.4 MG/1
0.4 CAPSULE ORAL
Refills: 0 | Status: ACTIVE | COMMUNITY

## 2024-09-12 RX ORDER — METFORMIN HYDROCHLORIDE 1000 MG/1
1000 TABLET, COATED ORAL
Refills: 0 | Status: ACTIVE | COMMUNITY

## 2024-09-12 RX ORDER — ASPIRIN ENTERIC COATED TABLETS 81 MG 81 MG/1
81 TABLET, DELAYED RELEASE ORAL
Refills: 0 | Status: ACTIVE | COMMUNITY

## 2024-09-12 NOTE — DATA REVIEWED
[FreeTextEntry1] : Cardiac Catheterization from 09/10/24 at [facility]  - Severe left main and 3 vessel disease - 80% distal left main - 80% ostial LAD- 90% instent restenosis of mid LAD - 90% Ostial Circ - 90% OM2 - 100% stenosis of RCA with moderate collaterals from LAD -

## 2024-09-12 NOTE — REVIEW OF SYSTEMS
no known allergies [SOB on Exertion] : shortness of breath during exertion [Negative] : Heme/Lymph [FreeTextEntry9] : intermittent swelling left leg

## 2024-09-12 NOTE — HISTORY OF PRESENT ILLNESS
[FreeTextEntry1] : Mr. SIMON is a 64 year old male referred by Dr. Hagan who presents for consultation. His past medical history includes HTN, HLD, type 2 diabetes mellitus, CVA (residual left side weakness), carotid disease, BPH, coronary artery disease with PCI to LCx in 2006. He had been followed by cardiology and last offered complaints of increasing shortness of breath. He had outpatient stress testing performed noting ischemia. Elective cardiac catheterization was performed revealing multivessel coronary artery disease.  He presents to the office today to discuss possible surgical intervention.  Today he reports increasing shortness of breath on exertion such as walking longer distances. Patient denies chest pain, palpitations, cough, fevers, chills, fatigue and unintentional weight loss or gain.

## 2024-09-12 NOTE — REVIEW OF SYSTEMS
[SOB on Exertion] : shortness of breath during exertion [Negative] : Heme/Lymph [FreeTextEntry9] : intermittent swelling left leg

## 2024-09-12 NOTE — PHYSICAL EXAM
[Sclera] : the sclera and conjunctiva were normal [Neck Appearance] : the appearance of the neck was normal [] : the neck was supple [Respiration, Rhythm And Depth] : normal respiratory rhythm and effort [Auscultation Breath Sounds / Voice Sounds] : lungs were clear to auscultation bilaterally [Heart Rate And Rhythm] : heart rate was normal and rhythm regular [Heart Sounds] : normal S1 and S2 [Right Carotid Bruit] : no bruit heard over the right carotid [Left Carotid Bruit] : no bruit heard over the left carotid [2+] : left 2+ [FreeTextEntry2] : no edema [Bowel Sounds] : normal bowel sounds [Cervical Lymph Nodes Enlarged Posterior Bilaterally] : posterior cervical [Cervical Lymph Nodes Enlarged Anterior Bilaterally] : anterior cervical [Supraclavicular Lymph Nodes Enlarged Bilaterally] : supraclavicular [Abnormal Walk] : normal gait [Skin Color & Pigmentation] : normal skin color and pigmentation [No Focal Deficits] : no focal deficits [Oriented To Time, Place, And Person] : oriented to person, place, and time [Impaired Insight] : insight and judgment were intact [Affect] : the affect was normal

## 2024-09-12 NOTE — ASSESSMENT
[FreeTextEntry1] : I had the pleasure of seeing Mr. SIMON in the office today.   Briefly, this is a 64 year old male with a pertinent history of hypertension, hyperlipidemia, type 2 diabetes mellitus, CVA (residual left side weakness), carotid disease, coronary artery disease with PCI to LCx in 2006 and now presents with _ for surgical evaluation.   I have fully and independently reviewed and evaluated all available imaging. During this appointment, we discussed   The planned procedure, hospital stay and recovery was discussed in detail. All risks, benefits and alternatives were discussed at length with the patient. All questions addressed. The patient fully understood and would like to proceed with surgical intervention as discussed.   Preoperative checklist: - Confirm allergies, including latex: NKDA - Confirm pacemaker: NONE - Anticoagulation/antiplatelets noted and will be discontinued/continued: Continue Aspirin, Stop Plavix 4 days before surgery - SGLT-2 Inhibitors noted and will be discontinued 3 days prior to surgery: NONE - Stop Fish Oil Now   Testing: - Prior to coronary artery bypass grafting, a beta-blocker has been ordered/is contraindicated due to - Vein mapping to be completed for coronary artery bypass graft conduit evaluation (done) - Presurgical testing to be scheduled, which will include chest x-ray, electrocardiogram and standard labs - Testing to be completed prior to surgery includes:            - pulmonary function tests            - baseline head CT   Surgical Plan: - coronary artery bypass grafting using LIMA to LAD, SVG to OM and PDA   I, Dr. Chandrakant Figueroa, personally performed the evaluation and management (E/M) services for this established patient who presents today with an existing condition.  That E/M includes conducting the examination, assessing all conditions, and establishing a new plan of care.  Today, Danny Castro PA-C, was here to observe my evaluation and management services for this/these condition(s) to be followed going forward.

## 2024-09-20 ENCOUNTER — OUTPATIENT (OUTPATIENT)
Dept: OUTPATIENT SERVICES | Facility: HOSPITAL | Age: 64
LOS: 1 days | End: 2024-09-20
Payer: MEDICARE

## 2024-09-20 ENCOUNTER — RESULT REVIEW (OUTPATIENT)
Age: 64
End: 2024-09-20

## 2024-09-20 VITALS
SYSTOLIC BLOOD PRESSURE: 124 MMHG | OXYGEN SATURATION: 97 % | RESPIRATION RATE: 18 BRPM | DIASTOLIC BLOOD PRESSURE: 70 MMHG | HEIGHT: 70 IN | HEART RATE: 69 BPM | WEIGHT: 171.08 LBS | TEMPERATURE: 97 F

## 2024-09-20 DIAGNOSIS — E11.9 TYPE 2 DIABETES MELLITUS WITHOUT COMPLICATIONS: ICD-10-CM

## 2024-09-20 DIAGNOSIS — Z98.890 OTHER SPECIFIED POSTPROCEDURAL STATES: Chronic | ICD-10-CM

## 2024-09-20 DIAGNOSIS — Z01.818 ENCOUNTER FOR OTHER PREPROCEDURAL EXAMINATION: ICD-10-CM

## 2024-09-20 DIAGNOSIS — Z29.9 ENCOUNTER FOR PROPHYLACTIC MEASURES, UNSPECIFIED: ICD-10-CM

## 2024-09-20 DIAGNOSIS — I25.10 ATHEROSCLEROTIC HEART DISEASE OF NATIVE CORONARY ARTERY WITHOUT ANGINA PECTORIS: Chronic | ICD-10-CM

## 2024-09-20 DIAGNOSIS — I25.10 ATHEROSCLEROTIC HEART DISEASE OF NATIVE CORONARY ARTERY WITHOUT ANGINA PECTORIS: ICD-10-CM

## 2024-09-20 LAB
A1C WITH ESTIMATED AVERAGE GLUCOSE RESULT: 7.2 % — HIGH (ref 4–5.6)
ALBUMIN SERPL ELPH-MCNC: 4 G/DL — SIGNIFICANT CHANGE UP (ref 3.3–5.2)
ALP SERPL-CCNC: 77 U/L — SIGNIFICANT CHANGE UP (ref 40–120)
ALT FLD-CCNC: 14 U/L — SIGNIFICANT CHANGE UP
ANION GAP SERPL CALC-SCNC: 13 MMOL/L — SIGNIFICANT CHANGE UP (ref 5–17)
APPEARANCE UR: ABNORMAL
APTT BLD: 29.2 SEC — SIGNIFICANT CHANGE UP (ref 24.5–35.6)
AST SERPL-CCNC: 16 U/L — SIGNIFICANT CHANGE UP
BACTERIA # UR AUTO: NEGATIVE /HPF — SIGNIFICANT CHANGE UP
BASOPHILS # BLD AUTO: 0.02 K/UL — SIGNIFICANT CHANGE UP (ref 0–0.2)
BASOPHILS NFR BLD AUTO: 0.2 % — SIGNIFICANT CHANGE UP (ref 0–2)
BILIRUB SERPL-MCNC: 0.4 MG/DL — SIGNIFICANT CHANGE UP (ref 0.4–2)
BILIRUB UR-MCNC: NEGATIVE — SIGNIFICANT CHANGE UP
BLD GP AB SCN SERPL QL: SIGNIFICANT CHANGE UP
BUN SERPL-MCNC: 19.4 MG/DL — SIGNIFICANT CHANGE UP (ref 8–20)
CALCIUM SERPL-MCNC: 8.8 MG/DL — SIGNIFICANT CHANGE UP (ref 8.4–10.5)
CAST: 4 /LPF — SIGNIFICANT CHANGE UP (ref 0–4)
CHLORIDE SERPL-SCNC: 105 MMOL/L — SIGNIFICANT CHANGE UP (ref 96–108)
CO2 SERPL-SCNC: 22 MMOL/L — SIGNIFICANT CHANGE UP (ref 22–29)
COLOR SPEC: YELLOW — SIGNIFICANT CHANGE UP
CREAT SERPL-MCNC: 0.95 MG/DL — SIGNIFICANT CHANGE UP (ref 0.5–1.3)
DIFF PNL FLD: ABNORMAL
EGFR: 88 ML/MIN/1.73M2 — SIGNIFICANT CHANGE UP
EOSINOPHIL # BLD AUTO: 0.07 K/UL — SIGNIFICANT CHANGE UP (ref 0–0.5)
EOSINOPHIL NFR BLD AUTO: 0.8 % — SIGNIFICANT CHANGE UP (ref 0–6)
ESTIMATED AVERAGE GLUCOSE: 160 MG/DL — HIGH (ref 68–114)
GLUCOSE SERPL-MCNC: 279 MG/DL — HIGH (ref 70–99)
GLUCOSE UR QL: >=1000 MG/DL
HCT VFR BLD CALC: 43.4 % — SIGNIFICANT CHANGE UP (ref 39–50)
HGB BLD-MCNC: 14.5 G/DL — SIGNIFICANT CHANGE UP (ref 13–17)
IMM GRANULOCYTES NFR BLD AUTO: 0.2 % — SIGNIFICANT CHANGE UP (ref 0–0.9)
INR BLD: 0.95 RATIO — SIGNIFICANT CHANGE UP (ref 0.85–1.18)
KETONES UR-MCNC: ABNORMAL MG/DL
LEUKOCYTE ESTERASE UR-ACNC: ABNORMAL
LYMPHOCYTES # BLD AUTO: 0.9 K/UL — LOW (ref 1–3.3)
LYMPHOCYTES # BLD AUTO: 10.3 % — LOW (ref 13–44)
MAGNESIUM SERPL-MCNC: 1.9 MG/DL — SIGNIFICANT CHANGE UP (ref 1.6–2.6)
MCHC RBC-ENTMCNC: 29.7 PG — SIGNIFICANT CHANGE UP (ref 27–34)
MCHC RBC-ENTMCNC: 33.4 GM/DL — SIGNIFICANT CHANGE UP (ref 32–36)
MCV RBC AUTO: 88.8 FL — SIGNIFICANT CHANGE UP (ref 80–100)
MONOCYTES # BLD AUTO: 0.44 K/UL — SIGNIFICANT CHANGE UP (ref 0–0.9)
MONOCYTES NFR BLD AUTO: 5 % — SIGNIFICANT CHANGE UP (ref 2–14)
MRSA PCR RESULT.: SIGNIFICANT CHANGE UP
NEUTROPHILS # BLD AUTO: 7.31 K/UL — SIGNIFICANT CHANGE UP (ref 1.8–7.4)
NEUTROPHILS NFR BLD AUTO: 83.5 % — HIGH (ref 43–77)
NITRITE UR-MCNC: NEGATIVE — SIGNIFICANT CHANGE UP
NT-PROBNP SERPL-SCNC: 185 PG/ML — SIGNIFICANT CHANGE UP (ref 0–300)
PH UR: 5.5 — SIGNIFICANT CHANGE UP (ref 5–8)
PLATELET # BLD AUTO: 219 K/UL — SIGNIFICANT CHANGE UP (ref 150–400)
POTASSIUM SERPL-MCNC: 4.2 MMOL/L — SIGNIFICANT CHANGE UP (ref 3.5–5.3)
POTASSIUM SERPL-SCNC: 4.2 MMOL/L — SIGNIFICANT CHANGE UP (ref 3.5–5.3)
PREALB SERPL-MCNC: 21 MG/DL — SIGNIFICANT CHANGE UP (ref 18–38)
PROT SERPL-MCNC: 7 G/DL — SIGNIFICANT CHANGE UP (ref 6.6–8.7)
PROT UR-MCNC: NEGATIVE MG/DL — SIGNIFICANT CHANGE UP
PROTHROM AB SERPL-ACNC: 10.6 SEC — SIGNIFICANT CHANGE UP (ref 9.5–13)
RBC # BLD: 4.89 M/UL — SIGNIFICANT CHANGE UP (ref 4.2–5.8)
RBC # FLD: 15 % — HIGH (ref 10.3–14.5)
RBC CASTS # UR COMP ASSIST: 7 /HPF — HIGH (ref 0–4)
S AUREUS DNA NOSE QL NAA+PROBE: SIGNIFICANT CHANGE UP
SODIUM SERPL-SCNC: 140 MMOL/L — SIGNIFICANT CHANGE UP (ref 135–145)
SP GR SPEC: >1.03 — HIGH (ref 1–1.03)
SQUAMOUS # UR AUTO: 8 /HPF — HIGH (ref 0–5)
T3 SERPL-MCNC: 112 NG/DL — SIGNIFICANT CHANGE UP (ref 80–200)
T4 AB SER-ACNC: 6.6 UG/DL — SIGNIFICANT CHANGE UP (ref 4.5–12)
TSH SERPL-MCNC: 3.68 UIU/ML — SIGNIFICANT CHANGE UP (ref 0.27–4.2)
UROBILINOGEN FLD QL: 0.2 MG/DL — SIGNIFICANT CHANGE UP (ref 0.2–1)
WBC # BLD: 8.76 K/UL — SIGNIFICANT CHANGE UP (ref 3.8–10.5)
WBC # FLD AUTO: 8.76 K/UL — SIGNIFICANT CHANGE UP (ref 3.8–10.5)
WBC UR QL: 341 /HPF — HIGH (ref 0–5)

## 2024-09-20 PROCEDURE — 93010 ELECTROCARDIOGRAM REPORT: CPT

## 2024-09-20 PROCEDURE — 71046 X-RAY EXAM CHEST 2 VIEWS: CPT | Mod: 26

## 2024-09-20 NOTE — H&P PST ADULT - HISTORY OF PRESENT ILLNESS
Pt is a 64 years old male seen today pre-op fro Coronary artery bypass graft x3 for CAD. Pt   Pt is a 64 years old male seen today pre-op fro Coronary artery bypass graft x3 for CAD. Pt  reports intermittent SOB and CAMARENA. Pt  denies chest pain, denies cornell  palpitation, syncope, peripheral edmea and any o Pt is a 64 years old male seen today pre-op fro Coronary artery bypass graft x3 for CAD. Pt  reports intermittent SOB and CAMARENA. Pt  denies chest pain, denies cornell  palpitation, syncope, peripheral edema and any other related issues.Pt medical hx includes CAD,(Cardia stents), DMT2, HLD, BPH, HTN, TIA(Pt on ASA and Plavix),  Pt is a  64 year old male referred by Dr. Hagan for Coronary artery bypass graft x3 for CAD. Pt  past medical history includes HTN, HLD, type 2 diabetes mellitus, CVA (residual left side weakness), carotid disease, BPH, coronary artery disease with PCI to LCx in 2006.  Last  cardiac catheterization revealed multivessel coronary artery disease. Pt reports shortness of breath, exertional dyspnea. Pt  denies chest pain, denies cornell  palpitation, syncope, peripheral edema and any other related issues. Pt scheduled for this surgery on 10/01/24 with Dr. Figueroa

## 2024-09-20 NOTE — H&P PST ADULT - RESPIRATORY
normal/clear to auscultation bilaterally/no wheezes/no rales/no rhonchi normal/clear to auscultation bilaterally/no wheezes/no rales/no rhonchi/no respiratory distress/no use of accessory muscles/no subcutaneous emphysema/airway patent/breath sounds equal/good air movement

## 2024-09-20 NOTE — H&P PST ADULT - ADMIT DATE
Patient also requested to have a detailed voice message if he does not  the first call.    20-Sep-2024

## 2024-09-20 NOTE — H&P PST ADULT - EKG AND INTERPRETATION
Sinus rhythm at 70 bpm,  with 1st degree AV Block, left axis deviation, no acute change. EKG pending official reading

## 2024-09-20 NOTE — H&P PST ADULT - NSANTHOSAYNRD_GEN_A_CORE
Patient remains free from falls and injury this shift. Bed in low, locked position with call bell in reach. Patient up ad jhony. R midline placed. Iv morpine given once for mouth pain. All belongings within reach will continue to monitor.       No. LEYLA screening performed.  STOP BANG Legend: 0-2 = LOW Risk; 3-4 = INTERMEDIATE Risk; 5-8 = HIGH Risk

## 2024-09-20 NOTE — H&P PST ADULT - ASSESSMENT
Pt is a  64 year old male seen today pre-op for Coronary artery bypass graft x3 for CAD. Pt scheduled for this surgery on 10/01/24 with Dr. Figueroa. Surgery protocol reviewed with Pt today, Pt instructed on the last dose of Plavix 24 and continue with ASA as per Dr. Figueroa's office instructions, Pt was also instructed on last dose of Farxiga 24, Metformin 24, prior to this surgery as per surgery protocol    CAPRINI VTE 2.0 SCORE [CLOT updated 2019]    AGE RELATED RISK FACTORS                                                       MOBILITY RELATED FACTORS  [ ] Age 41-60 years                                            (1 Point)                    [ ] Bed rest                                                        (1 Point)  [ x] Age: 61-74 years                                           (2 Points)                  [ ] Plaster cast                                                   (2 Points)  [ ] Age= 75 years                                              (3 Points)                    [ ] Bed bound for more than 72 hours                 (2 Points)    DISEASE RELATED RISK FACTORS                                               GENDER SPECIFIC FACTORS  [ ] Edema in the lower extremities                       (1 Point)              [ ] Pregnancy                                                     (1 Point)  [ ] Varicose veins                                               (1 Point)                     [ ] Post-partum < 6 weeks                                   (1 Point)             x[ ] BMI > 25 Kg/m2                                            (1 Point)                     [ ] Hormonal therapy  or oral contraception          (1 Point)                 [ ] Sepsis (in the previous month)                        (1 Point)               [ ] History of pregnancy complications                 (1 point)  [ ] Pneumonia or serious lung disease                                               [ ] Unexplained or recurrent                     (1 Point)           (in the previous month)                               (1 Point)  [ ] Abnormal pulmonary function test                     (1 Point)                 SURGERY RELATED RISK FACTORS  [ ] Acute myocardial infarction                              (1 Point)               [ ]  Section                                             (1 Point)  [ ] Congestive heart failure (in the previous month)  (1 Point)      [ ] Minor surgery                                                  (1 Point)   [ ] Inflammatory bowel disease                             (1 Point)               [ ] Arthroscopic surgery                                        (2 Points)  [ ] Central venous access                                      (2 Points)                [x ] General surgery lasting more than 45 minutes (2 points)  [ ] Malignancy- Present or previous                   (2 Points)                [ ] Elective arthroplasty                                         (5 points)    [ ] Stroke (in the previous month)                          (5 Points)                                                                                                                                                           HEMATOLOGY RELATED FACTORS                                                 TRAUMA RELATED RISK FACTORS  [ ] Prior episodes of VTE                                     (3 Points)                [ ] Fracture of the hip, pelvis, or leg                       (5 Points)  [ ] Positive family history for VTE                         (3 Points)             [ ] Acute spinal cord injury (in the previous month)  (5 Points)  [ ] Prothrombin 84049 A                                     (3 Points)               [ ] Paralysis  (less than 1 month)                             (5 Points)  [ ] Factor V Leiden                                             (3 Points)                  [ ] Multiple Trauma within 1 month                        (5 Points)  [ ] Lupus anticoagulants                                     (3 Points)                                                           [ ] Anticardiolipin antibodies                               (3 Points)                                                       [ ] High homocysteine in the blood                      (3 Points)                                             [ ] Other congenital or acquired thrombophilia      (3 Points)                                                [ ] Heparin induced thrombocytopenia                  (3 Points)                                     Total Score [   5      ]  OPIOID RISK TOOL    [place] EACH BOX THAT APPLIES AND ADD TOTALS AT THE END    FAMILY HISTORY OF SUBSTANCE ABUSE                 FEMALE         MALE                                                Alcohol                             [  ]1 pt          [  ]3pts                                               Illegal Durgs                     [  ]2 pts        [  ]3pts                                               Rx Drugs                           [  ]4 pts        [  ]4 pts    PERSONAL HISTORY OF SUBSTANCE ABUSE                                                                                          Alcohol                             [  ]3 pts       [  ]3 pts                                               Illegal Drugs                     [  ]4 pts        [  ]4 pts                                               Rx Drugs                           [  ]5 pts        [  ]5 pts    AGE BETWEEN 16-45 YEARS                                      [  ]1 pt         [  ]1 pt    HISTORY OF PREADOLESCENT   SEXUAL ABUSE                                                             [  ]3 pts        [  ]0pts    PSYCHOLOGICAL DISEASE                     ADD, OCD, Bipolar, Schizophrenia        [  ]2 pts         [  ]2 pts                      Depression                                               [ x ]1 pt           [  ]1 pt           SCORING TOTAL   (add numbers and type here)              (*1**)                                     A score of 3 or lower indicated LOW risk for future opioid abuse  A score of 4 to 7 indicated moderate risk for future opioid abuse  A score of 8 or higher indicates a high risk for opioid abuse

## 2024-09-20 NOTE — H&P PST ADULT - GASTROINTESTINAL
negative normal/soft/nontender/nondistended/normal active bowel sounds normal/soft/nontender/nondistended/normal active bowel sounds/no guarding/no rigidity/no organomegaly/no palpable donell

## 2024-09-21 ENCOUNTER — OUTPATIENT (OUTPATIENT)
Dept: OUTPATIENT SERVICES | Facility: HOSPITAL | Age: 64
LOS: 1 days | End: 2024-09-21
Payer: MEDICARE

## 2024-09-21 ENCOUNTER — APPOINTMENT (OUTPATIENT)
Dept: CT IMAGING | Facility: CLINIC | Age: 64
End: 2024-09-21
Payer: MEDICARE

## 2024-09-21 DIAGNOSIS — I25.10 ATHEROSCLEROTIC HEART DISEASE OF NATIVE CORONARY ARTERY WITHOUT ANGINA PECTORIS: Chronic | ICD-10-CM

## 2024-09-21 DIAGNOSIS — I25.10 ATHEROSCLEROTIC HEART DISEASE OF NATIVE CORONARY ARTERY WITHOUT ANGINA PECTORIS: ICD-10-CM

## 2024-09-21 PROCEDURE — 70450 CT HEAD/BRAIN W/O DYE: CPT

## 2024-09-21 PROCEDURE — 70450 CT HEAD/BRAIN W/O DYE: CPT | Mod: 26

## 2024-09-22 LAB
CULTURE RESULTS: ABNORMAL
SPECIMEN SOURCE: SIGNIFICANT CHANGE UP

## 2024-09-23 ENCOUNTER — APPOINTMENT (OUTPATIENT)
Dept: PULMONOLOGY | Facility: CLINIC | Age: 64
End: 2024-09-23
Payer: MEDICARE

## 2024-09-23 VITALS — BODY MASS INDEX: 25.46 KG/M2 | HEIGHT: 68 IN | WEIGHT: 168 LBS

## 2024-09-23 DIAGNOSIS — I25.10 ATHEROSCLEROTIC HEART DISEASE OF NATIVE CORONARY ARTERY W/OUT ANGINA PECTORIS: ICD-10-CM

## 2024-09-23 PROCEDURE — 85018 HEMOGLOBIN: CPT | Mod: QW

## 2024-09-23 PROCEDURE — 94010 BREATHING CAPACITY TEST: CPT

## 2024-09-23 PROCEDURE — 94729 DIFFUSING CAPACITY: CPT

## 2024-09-23 PROCEDURE — 94727 GAS DIL/WSHOT DETER LNG VOL: CPT

## 2024-09-25 RX ORDER — FINASTERIDE 5 MG/1
5 TABLET, FILM COATED ORAL
Refills: 0 | Status: ACTIVE | COMMUNITY
Start: 2024-09-25

## 2024-09-27 RX ORDER — AMOXICILLIN 500 MG/1
500 CAPSULE ORAL
Qty: 20 | Refills: 0 | Status: ACTIVE | COMMUNITY
Start: 2024-09-27 | End: 1900-01-01

## 2024-10-02 ENCOUNTER — INPATIENT (INPATIENT)
Facility: HOSPITAL | Age: 64
LOS: 4 days | Discharge: HOME | DRG: 303 | End: 2024-10-07
Attending: THORACIC SURGERY (CARDIOTHORACIC VASCULAR SURGERY) | Admitting: THORACIC SURGERY (CARDIOTHORACIC VASCULAR SURGERY)
Payer: MEDICARE

## 2024-10-02 ENCOUNTER — APPOINTMENT (OUTPATIENT)
Dept: CARDIOTHORACIC SURGERY | Facility: HOSPITAL | Age: 64
End: 2024-10-02

## 2024-10-02 ENCOUNTER — RESULT REVIEW (OUTPATIENT)
Age: 64
End: 2024-10-02

## 2024-10-02 VITALS
SYSTOLIC BLOOD PRESSURE: 112 MMHG | HEART RATE: 78 BPM | WEIGHT: 171.08 LBS | RESPIRATION RATE: 18 BRPM | OXYGEN SATURATION: 99 % | DIASTOLIC BLOOD PRESSURE: 67 MMHG | TEMPERATURE: 98 F | HEIGHT: 70 IN

## 2024-10-02 DIAGNOSIS — I25.10 ATHEROSCLEROTIC HEART DISEASE OF NATIVE CORONARY ARTERY WITHOUT ANGINA PECTORIS: Chronic | ICD-10-CM

## 2024-10-02 DIAGNOSIS — I25.10 ATHEROSCLEROTIC HEART DISEASE OF NATIVE CORONARY ARTERY WITHOUT ANGINA PECTORIS: ICD-10-CM

## 2024-10-02 DIAGNOSIS — Z98.890 OTHER SPECIFIED POSTPROCEDURAL STATES: Chronic | ICD-10-CM

## 2024-10-02 LAB
ABO RH CONFIRMATION: SIGNIFICANT CHANGE UP
ALBUMIN SERPL ELPH-MCNC: 3 G/DL — LOW (ref 3.3–5.2)
ALP SERPL-CCNC: 38 U/L — LOW (ref 40–120)
ALT FLD-CCNC: 18 U/L — SIGNIFICANT CHANGE UP
ANION GAP SERPL CALC-SCNC: 13 MMOL/L — SIGNIFICANT CHANGE UP (ref 5–17)
APPEARANCE UR: ABNORMAL
APTT BLD: 26.6 SEC — SIGNIFICANT CHANGE UP (ref 24.5–35.6)
APTT BLD: 27.3 SEC — SIGNIFICANT CHANGE UP (ref 24.5–35.6)
AST SERPL-CCNC: 33 U/L — SIGNIFICANT CHANGE UP
BACTERIA # UR AUTO: ABNORMAL /HPF
BASE EXCESS BLDA CALC-SCNC: -0.4 MMOL/L — SIGNIFICANT CHANGE UP (ref -2–3)
BASE EXCESS BLDA CALC-SCNC: -1.8 MMOL/L — SIGNIFICANT CHANGE UP (ref -2–3)
BASE EXCESS BLDA CALC-SCNC: -3.1 MMOL/L — LOW (ref -2–3)
BASE EXCESS BLDA CALC-SCNC: -3.1 MMOL/L — LOW (ref -2–3)
BASE EXCESS BLDA CALC-SCNC: -4.6 MMOL/L — LOW (ref -2–3)
BASE EXCESS BLDA CALC-SCNC: -5.1 MMOL/L — LOW (ref -2–3)
BASE EXCESS BLDA CALC-SCNC: 0.3 MMOL/L — SIGNIFICANT CHANGE UP (ref -2–3)
BASE EXCESS BLDA CALC-SCNC: 1.8 MMOL/L — SIGNIFICANT CHANGE UP (ref -2–3)
BASE EXCESS BLDV CALC-SCNC: -0.5 MMOL/L — SIGNIFICANT CHANGE UP (ref -2–3)
BASE EXCESS BLDV CALC-SCNC: -2.2 MMOL/L — LOW (ref -2–3)
BASE EXCESS BLDV CALC-SCNC: -2.2 MMOL/L — LOW (ref -2–3)
BASE EXCESS BLDV CALC-SCNC: -6.4 MMOL/L — LOW (ref -2–3)
BASOPHILS # BLD AUTO: 0.05 K/UL — SIGNIFICANT CHANGE UP (ref 0–0.2)
BASOPHILS NFR BLD AUTO: 0.2 % — SIGNIFICANT CHANGE UP (ref 0–2)
BILIRUB SERPL-MCNC: 0.6 MG/DL — SIGNIFICANT CHANGE UP (ref 0.4–2)
BILIRUB UR-MCNC: NEGATIVE — SIGNIFICANT CHANGE UP
BUN SERPL-MCNC: 11.2 MG/DL — SIGNIFICANT CHANGE UP (ref 8–20)
CA-I BLDA-SCNC: 0.85 MMOL/L — LOW (ref 1.15–1.33)
CA-I BLDA-SCNC: 0.92 MMOL/L — LOW (ref 1.15–1.33)
CA-I BLDA-SCNC: 0.93 MMOL/L — LOW (ref 1.15–1.33)
CA-I BLDA-SCNC: 1.09 MMOL/L — LOW (ref 1.15–1.33)
CA-I BLDA-SCNC: 1.09 MMOL/L — LOW (ref 1.15–1.33)
CA-I BLDA-SCNC: 1.16 MMOL/L — SIGNIFICANT CHANGE UP (ref 1.15–1.33)
CA-I BLDA-SCNC: 1.23 MMOL/L — SIGNIFICANT CHANGE UP (ref 1.15–1.33)
CA-I BLDA-SCNC: 1.28 MMOL/L — SIGNIFICANT CHANGE UP (ref 1.15–1.33)
CA-I BLDA-SCNC: 1.74 MMOL/L — CRITICAL HIGH (ref 1.15–1.33)
CA-I BLDA-SCNC: 1.74 MMOL/L — CRITICAL HIGH (ref 1.15–1.33)
CA-I SERPL-SCNC: 0.85 MMOL/L — LOW (ref 1.15–1.33)
CA-I SERPL-SCNC: 0.96 MMOL/L — LOW (ref 1.15–1.33)
CA-I SERPL-SCNC: 1.07 MMOL/L — LOW (ref 1.15–1.33)
CA-I SERPL-SCNC: 1.07 MMOL/L — LOW (ref 1.15–1.33)
CALCIUM SERPL-MCNC: 8.4 MG/DL — SIGNIFICANT CHANGE UP (ref 8.4–10.5)
CAST: 0 /LPF — SIGNIFICANT CHANGE UP (ref 0–4)
CHLORIDE BLDA-SCNC: 103 MMOL/L — SIGNIFICANT CHANGE UP (ref 96–108)
CHLORIDE BLDA-SCNC: 105 MMOL/L — SIGNIFICANT CHANGE UP (ref 96–108)
CHLORIDE BLDA-SCNC: 105 MMOL/L — SIGNIFICANT CHANGE UP (ref 96–108)
CHLORIDE BLDA-SCNC: 107 MMOL/L — SIGNIFICANT CHANGE UP (ref 96–108)
CHLORIDE BLDA-SCNC: 108 MMOL/L — SIGNIFICANT CHANGE UP (ref 96–108)
CHLORIDE BLDA-SCNC: 109 MMOL/L — HIGH (ref 96–108)
CHLORIDE BLDA-SCNC: 110 MMOL/L — HIGH (ref 96–108)
CHLORIDE BLDA-SCNC: 110 MMOL/L — HIGH (ref 96–108)
CHLORIDE BLDV-SCNC: 101 MMOL/L — SIGNIFICANT CHANGE UP (ref 96–108)
CHLORIDE BLDV-SCNC: 106 MMOL/L — SIGNIFICANT CHANGE UP (ref 96–108)
CHLORIDE BLDV-SCNC: 108 MMOL/L — SIGNIFICANT CHANGE UP (ref 96–108)
CHLORIDE BLDV-SCNC: 108 MMOL/L — SIGNIFICANT CHANGE UP (ref 96–108)
CHLORIDE SERPL-SCNC: 105 MMOL/L — SIGNIFICANT CHANGE UP (ref 96–108)
CK MB CFR SERPL CALC: 25.2 NG/ML — HIGH (ref 0–6.7)
CK SERPL-CCNC: 253 U/L — HIGH (ref 30–200)
CO2 SERPL-SCNC: 20 MMOL/L — LOW (ref 22–29)
COHGB MFR BLDA: 0.3 % — SIGNIFICANT CHANGE UP
COHGB MFR BLDA: 0.6 % — SIGNIFICANT CHANGE UP
COHGB MFR BLDA: 0.9 % — SIGNIFICANT CHANGE UP
COHGB MFR BLDA: 0.9 % — SIGNIFICANT CHANGE UP
COHGB MFR BLDA: 1 % — SIGNIFICANT CHANGE UP
COHGB MFR BLDA: 1 % — SIGNIFICANT CHANGE UP
COHGB MFR BLDA: 1.1 % — SIGNIFICANT CHANGE UP
COHGB MFR BLDA: 1.4 % — SIGNIFICANT CHANGE UP
COHGB MFR BLDV: 0.9 % — SIGNIFICANT CHANGE UP
COHGB MFR BLDV: 1.5 % — SIGNIFICANT CHANGE UP
COHGB MFR BLDV: 1.5 % — SIGNIFICANT CHANGE UP
COHGB MFR BLDV: 2.1 % — SIGNIFICANT CHANGE UP
COLOR SPEC: YELLOW — SIGNIFICANT CHANGE UP
CREAT SERPL-MCNC: 0.7 MG/DL — SIGNIFICANT CHANGE UP (ref 0.5–1.3)
DIFF PNL FLD: NEGATIVE — SIGNIFICANT CHANGE UP
EGFR: 102 ML/MIN/1.73M2 — SIGNIFICANT CHANGE UP
EOSINOPHIL # BLD AUTO: 0.05 K/UL — SIGNIFICANT CHANGE UP (ref 0–0.5)
EOSINOPHIL NFR BLD AUTO: 0.2 % — SIGNIFICANT CHANGE UP (ref 0–6)
GAS PNL BLDA: SIGNIFICANT CHANGE UP
GAS PNL BLDV: 134 MMOL/L — LOW (ref 136–145)
GAS PNL BLDV: 137 MMOL/L — SIGNIFICANT CHANGE UP (ref 136–145)
GLUCOSE BLDA-MCNC: 128 MG/DL — HIGH (ref 70–99)
GLUCOSE BLDA-MCNC: 135 MG/DL — HIGH (ref 70–99)
GLUCOSE BLDA-MCNC: 145 MG/DL — HIGH (ref 70–99)
GLUCOSE BLDA-MCNC: 145 MG/DL — HIGH (ref 70–99)
GLUCOSE BLDA-MCNC: 179 MG/DL — HIGH (ref 70–99)
GLUCOSE BLDA-MCNC: 182 MG/DL — HIGH (ref 70–99)
GLUCOSE BLDA-MCNC: 195 MG/DL — HIGH (ref 70–99)
GLUCOSE BLDA-MCNC: 222 MG/DL — HIGH (ref 70–99)
GLUCOSE BLDA-MCNC: 222 MG/DL — HIGH (ref 70–99)
GLUCOSE BLDA-MCNC: 235 MG/DL — HIGH (ref 70–99)
GLUCOSE BLDC GLUCOMTR-MCNC: 170 MG/DL — HIGH (ref 70–99)
GLUCOSE BLDC GLUCOMTR-MCNC: 180 MG/DL — HIGH (ref 70–99)
GLUCOSE BLDC GLUCOMTR-MCNC: 193 MG/DL — HIGH (ref 70–99)
GLUCOSE BLDC GLUCOMTR-MCNC: 205 MG/DL — HIGH (ref 70–99)
GLUCOSE BLDC GLUCOMTR-MCNC: 224 MG/DL — HIGH (ref 70–99)
GLUCOSE BLDC GLUCOMTR-MCNC: 241 MG/DL — HIGH (ref 70–99)
GLUCOSE BLDV-MCNC: 137 MG/DL — HIGH (ref 70–99)
GLUCOSE BLDV-MCNC: 149 MG/DL — HIGH (ref 70–99)
GLUCOSE BLDV-MCNC: 149 MG/DL — HIGH (ref 70–99)
GLUCOSE BLDV-MCNC: 167 MG/DL — HIGH (ref 70–99)
GLUCOSE SERPL-MCNC: 185 MG/DL — HIGH (ref 70–99)
GLUCOSE UR QL: 250 MG/DL
HCO3 BLDA-SCNC: 20 MMOL/L — LOW (ref 21–28)
HCO3 BLDA-SCNC: 21 MMOL/L — SIGNIFICANT CHANGE UP (ref 21–28)
HCO3 BLDA-SCNC: 22 MMOL/L — SIGNIFICANT CHANGE UP (ref 21–28)
HCO3 BLDA-SCNC: 22 MMOL/L — SIGNIFICANT CHANGE UP (ref 21–28)
HCO3 BLDA-SCNC: 23 MMOL/L — SIGNIFICANT CHANGE UP (ref 21–28)
HCO3 BLDA-SCNC: 23 MMOL/L — SIGNIFICANT CHANGE UP (ref 21–28)
HCO3 BLDA-SCNC: 24 MMOL/L — SIGNIFICANT CHANGE UP (ref 21–28)
HCO3 BLDA-SCNC: 27 MMOL/L — SIGNIFICANT CHANGE UP (ref 21–28)
HCO3 BLDV-SCNC: 19 MMOL/L — LOW (ref 22–29)
HCO3 BLDV-SCNC: 23 MMOL/L — SIGNIFICANT CHANGE UP (ref 22–29)
HCO3 BLDV-SCNC: 23 MMOL/L — SIGNIFICANT CHANGE UP (ref 22–29)
HCO3 BLDV-SCNC: 24 MMOL/L — SIGNIFICANT CHANGE UP (ref 22–29)
HCT VFR BLD CALC: 29.2 % — LOW (ref 39–50)
HCT VFR BLD CALC: 31.6 % — LOW (ref 39–50)
HCT VFR BLDA CALC: 21 % — SIGNIFICANT CHANGE UP
HCT VFR BLDA CALC: 23 % — SIGNIFICANT CHANGE UP
HCT VFR BLDA CALC: 24 % — SIGNIFICANT CHANGE UP
HCT VFR BLDA CALC: 25 % — SIGNIFICANT CHANGE UP
HCT VFR BLDA CALC: 25 % — SIGNIFICANT CHANGE UP
HCT VFR BLDA CALC: 26 % — SIGNIFICANT CHANGE UP
HCT VFR BLDA CALC: 34 % — SIGNIFICANT CHANGE UP
HCT VFR BLDA CALC: 35 % — SIGNIFICANT CHANGE UP
HCT VFR BLDA CALC: 41 % — SIGNIFICANT CHANGE UP
HGB BLD CALC-MCNC: 7.1 G/DL — LOW (ref 12.6–17.4)
HGB BLD CALC-MCNC: 7.1 G/DL — LOW (ref 12.6–17.4)
HGB BLD CALC-MCNC: 7.9 G/DL — LOW (ref 12.6–17.4)
HGB BLD CALC-MCNC: 8.2 G/DL — LOW (ref 12.6–17.4)
HGB BLD-MCNC: 10.6 G/DL — LOW (ref 13–17)
HGB BLD-MCNC: 9.9 G/DL — LOW (ref 13–17)
HGB BLDA-MCNC: 11.2 G/DL — LOW (ref 12.6–17.4)
HGB BLDA-MCNC: 11.7 G/DL — LOW (ref 12.6–17.4)
HGB BLDA-MCNC: 13.8 G/DL — SIGNIFICANT CHANGE UP (ref 12.6–17.4)
HGB BLDA-MCNC: 7.1 G/DL — LOW (ref 12.6–17.4)
HGB BLDA-MCNC: 7.1 G/DL — LOW (ref 12.6–17.4)
HGB BLDA-MCNC: 7.5 G/DL — LOW (ref 12.6–17.4)
HGB BLDA-MCNC: 8.3 G/DL — LOW (ref 12.6–17.4)
HGB BLDA-MCNC: 8.5 G/DL — LOW (ref 12.6–17.4)
HGB BLDA-MCNC: 8.7 G/DL — LOW (ref 12.6–17.4)
HGB BLDA-MCNC: 8.7 G/DL — LOW (ref 12.6–17.4)
IMM GRANULOCYTES NFR BLD AUTO: 0.9 % — SIGNIFICANT CHANGE UP (ref 0–0.9)
INR BLD: 1.07 RATIO — SIGNIFICANT CHANGE UP (ref 0.85–1.16)
INR BLD: 1.13 RATIO — SIGNIFICANT CHANGE UP (ref 0.85–1.16)
KETONES UR-MCNC: NEGATIVE MG/DL — SIGNIFICANT CHANGE UP
LACTATE BLDA-MCNC: 0.8 MMOL/L — SIGNIFICANT CHANGE UP (ref 0.5–2)
LACTATE BLDA-MCNC: 1 MMOL/L — SIGNIFICANT CHANGE UP (ref 0.5–2)
LACTATE BLDA-MCNC: 1.1 MMOL/L — SIGNIFICANT CHANGE UP (ref 0.5–2)
LACTATE BLDA-MCNC: 1.6 MMOL/L — SIGNIFICANT CHANGE UP (ref 0.5–2)
LACTATE BLDA-MCNC: 1.6 MMOL/L — SIGNIFICANT CHANGE UP (ref 0.5–2)
LACTATE BLDA-MCNC: 2 MMOL/L — SIGNIFICANT CHANGE UP (ref 0.5–2)
LACTATE BLDA-MCNC: 2 MMOL/L — SIGNIFICANT CHANGE UP (ref 0.5–2)
LACTATE BLDA-MCNC: 2.8 MMOL/L — HIGH (ref 0.5–2)
LACTATE BLDA-MCNC: 2.8 MMOL/L — HIGH (ref 0.5–2)
LACTATE BLDA-MCNC: 3.2 MMOL/L — HIGH (ref 0.5–2)
LACTATE BLDV-MCNC: 0.9 MMOL/L — SIGNIFICANT CHANGE UP (ref 0.5–2)
LACTATE BLDV-MCNC: 1.6 MMOL/L — SIGNIFICANT CHANGE UP (ref 0.5–2)
LACTATE BLDV-MCNC: 1.9 MMOL/L — SIGNIFICANT CHANGE UP (ref 0.5–2)
LACTATE BLDV-MCNC: 1.9 MMOL/L — SIGNIFICANT CHANGE UP (ref 0.5–2)
LEUKOCYTE ESTERASE UR-ACNC: ABNORMAL
LYMPHOCYTES # BLD AUTO: 1.07 K/UL — SIGNIFICANT CHANGE UP (ref 1–3.3)
LYMPHOCYTES # BLD AUTO: 5.3 % — LOW (ref 13–44)
MCHC RBC-ENTMCNC: 29 PG — SIGNIFICANT CHANGE UP (ref 27–34)
MCHC RBC-ENTMCNC: 30 PG — SIGNIFICANT CHANGE UP (ref 27–34)
MCHC RBC-ENTMCNC: 33.5 GM/DL — SIGNIFICANT CHANGE UP (ref 32–36)
MCHC RBC-ENTMCNC: 33.9 GM/DL — SIGNIFICANT CHANGE UP (ref 32–36)
MCV RBC AUTO: 86.6 FL — SIGNIFICANT CHANGE UP (ref 80–100)
MCV RBC AUTO: 88.5 FL — SIGNIFICANT CHANGE UP (ref 80–100)
METHGB MFR BLDA: 0.2 % — SIGNIFICANT CHANGE UP
METHGB MFR BLDA: 0.3 % — SIGNIFICANT CHANGE UP
METHGB MFR BLDA: 0.4 % — SIGNIFICANT CHANGE UP
METHGB MFR BLDA: 0.5 % — SIGNIFICANT CHANGE UP
METHGB MFR BLDA: 0.7 % — SIGNIFICANT CHANGE UP
METHGB MFR BLDA: 0.8 % — SIGNIFICANT CHANGE UP
METHGB MFR BLDV: 0.5 % — SIGNIFICANT CHANGE UP
METHGB MFR BLDV: 1.7 % — HIGH
MONOCYTES # BLD AUTO: 1.06 K/UL — HIGH (ref 0–0.9)
MONOCYTES NFR BLD AUTO: 5.3 % — SIGNIFICANT CHANGE UP (ref 2–14)
NEUTROPHILS # BLD AUTO: 17.67 K/UL — HIGH (ref 1.8–7.4)
NEUTROPHILS NFR BLD AUTO: 88.1 % — HIGH (ref 43–77)
NITRITE UR-MCNC: NEGATIVE — SIGNIFICANT CHANGE UP
OXYHGB MFR BLDA: 98 % — HIGH (ref 90–95)
PCO2 BLDA: 32 MMHG — LOW (ref 35–48)
PCO2 BLDA: 35 MMHG — SIGNIFICANT CHANGE UP (ref 35–48)
PCO2 BLDA: 36 MMHG — SIGNIFICANT CHANGE UP (ref 35–48)
PCO2 BLDA: 38 MMHG — SIGNIFICANT CHANGE UP (ref 35–48)
PCO2 BLDA: 43 MMHG — SIGNIFICANT CHANGE UP (ref 35–48)
PCO2 BLDV: 36 MMHG — LOW (ref 42–55)
PCO2 BLDV: 37 MMHG — LOW (ref 42–55)
PCO2 BLDV: 40 MMHG — LOW (ref 42–55)
PCO2 BLDV: 40 MMHG — LOW (ref 42–55)
PH BLDA: 7.35 — SIGNIFICANT CHANGE UP (ref 7.35–7.45)
PH BLDA: 7.36 — SIGNIFICANT CHANGE UP (ref 7.35–7.45)
PH BLDA: 7.4 — SIGNIFICANT CHANGE UP (ref 7.35–7.45)
PH BLDA: 7.41 — SIGNIFICANT CHANGE UP (ref 7.35–7.45)
PH BLDA: 7.45 — SIGNIFICANT CHANGE UP (ref 7.35–7.45)
PH BLDA: 7.47 — HIGH (ref 7.35–7.45)
PH BLDV: 7.34 — SIGNIFICANT CHANGE UP (ref 7.32–7.43)
PH BLDV: 7.37 — SIGNIFICANT CHANGE UP (ref 7.32–7.43)
PH BLDV: 7.37 — SIGNIFICANT CHANGE UP (ref 7.32–7.43)
PH BLDV: 7.42 — SIGNIFICANT CHANGE UP (ref 7.32–7.43)
PH UR: 6 — SIGNIFICANT CHANGE UP (ref 5–8)
PLATELET # BLD AUTO: 248 K/UL — SIGNIFICANT CHANGE UP (ref 150–400)
PLATELET # BLD AUTO: 270 K/UL — SIGNIFICANT CHANGE UP (ref 150–400)
PO2 BLDA: 386 MMHG — HIGH (ref 83–108)
PO2 BLDA: 388 MMHG — HIGH (ref 83–108)
PO2 BLDA: 388 MMHG — HIGH (ref 83–108)
PO2 BLDA: 397 MMHG — HIGH (ref 83–108)
PO2 BLDA: 466 MMHG — HIGH (ref 83–108)
PO2 BLDA: 466 MMHG — HIGH (ref 83–108)
PO2 BLDA: 471 MMHG — HIGH (ref 83–108)
PO2 BLDA: 479 MMHG — HIGH (ref 83–108)
PO2 BLDA: >496 MMHG — HIGH (ref 83–108)
PO2 BLDA: >496 MMHG — HIGH (ref 83–108)
PO2 BLDV: 45 MMHG — SIGNIFICANT CHANGE UP (ref 25–45)
PO2 BLDV: 45 MMHG — SIGNIFICANT CHANGE UP (ref 25–45)
PO2 BLDV: 55 MMHG — HIGH (ref 25–45)
PO2 BLDV: 70 MMHG — HIGH (ref 25–45)
POTASSIUM BLDA-SCNC: 3.8 MMOL/L — SIGNIFICANT CHANGE UP (ref 3.5–5.1)
POTASSIUM BLDA-SCNC: 3.9 MMOL/L — SIGNIFICANT CHANGE UP (ref 3.5–5.1)
POTASSIUM BLDA-SCNC: 4.5 MMOL/L — SIGNIFICANT CHANGE UP (ref 3.5–5.1)
POTASSIUM BLDA-SCNC: 5 MMOL/L — SIGNIFICANT CHANGE UP (ref 3.5–5.1)
POTASSIUM BLDA-SCNC: 5.4 MMOL/L — HIGH (ref 3.5–5.1)
POTASSIUM BLDA-SCNC: 6.3 MMOL/L — CRITICAL HIGH (ref 3.5–5.1)
POTASSIUM BLDA-SCNC: 6.3 MMOL/L — CRITICAL HIGH (ref 3.5–5.1)
POTASSIUM BLDA-SCNC: 7.8 MMOL/L — CRITICAL HIGH (ref 3.5–5.1)
POTASSIUM BLDV-SCNC: 4.5 MMOL/L — SIGNIFICANT CHANGE UP (ref 3.5–5.1)
POTASSIUM BLDV-SCNC: 4.9 MMOL/L — SIGNIFICANT CHANGE UP (ref 3.5–5.1)
POTASSIUM BLDV-SCNC: 6.3 MMOL/L — CRITICAL HIGH (ref 3.5–5.1)
POTASSIUM BLDV-SCNC: 6.3 MMOL/L — CRITICAL HIGH (ref 3.5–5.1)
POTASSIUM SERPL-MCNC: 4.8 MMOL/L — SIGNIFICANT CHANGE UP (ref 3.5–5.3)
POTASSIUM SERPL-SCNC: 4.8 MMOL/L — SIGNIFICANT CHANGE UP (ref 3.5–5.3)
PROT SERPL-MCNC: 4.7 G/DL — LOW (ref 6.6–8.7)
PROT UR-MCNC: NEGATIVE MG/DL — SIGNIFICANT CHANGE UP
PROTHROM AB SERPL-ACNC: 12.1 SEC — SIGNIFICANT CHANGE UP (ref 9.9–13.4)
PROTHROM AB SERPL-ACNC: 12.8 SEC — SIGNIFICANT CHANGE UP (ref 9.9–13.4)
RBC # BLD: 3.3 M/UL — LOW (ref 4.2–5.8)
RBC # BLD: 3.65 M/UL — LOW (ref 4.2–5.8)
RBC # FLD: 15.7 % — HIGH (ref 10.3–14.5)
RBC # FLD: 15.8 % — HIGH (ref 10.3–14.5)
RBC CASTS # UR COMP ASSIST: 1 /HPF — SIGNIFICANT CHANGE UP (ref 0–4)
SAO2 % BLDA: 100 % — HIGH (ref 94–98)
SAO2 % BLDA: 99 % — HIGH (ref 94–98)
SAO2 % BLDA: 99.1 % — HIGH (ref 94–98)
SAO2 % BLDA: 99.1 % — HIGH (ref 94–98)
SAO2 % BLDA: 99.2 % — HIGH (ref 94–98)
SAO2 % BLDA: 99.7 % — HIGH (ref 94–98)
SAO2 % BLDV: 78.1 % — SIGNIFICANT CHANGE UP (ref 67–88)
SAO2 % BLDV: 78.1 % — SIGNIFICANT CHANGE UP (ref 67–88)
SAO2 % BLDV: 90.9 % — HIGH (ref 67–88)
SAO2 % BLDV: 95.4 % — HIGH (ref 67–88)
SODIUM BLDA-SCNC: 133 MMOL/L — LOW (ref 136–145)
SODIUM BLDA-SCNC: 134 MMOL/L — LOW (ref 136–145)
SODIUM BLDA-SCNC: 134 MMOL/L — LOW (ref 136–145)
SODIUM BLDA-SCNC: 136 MMOL/L — SIGNIFICANT CHANGE UP (ref 136–145)
SODIUM SERPL-SCNC: 138 MMOL/L — SIGNIFICANT CHANGE UP (ref 135–145)
SP GR SPEC: 1.01 — SIGNIFICANT CHANGE UP (ref 1–1.03)
SQUAMOUS # UR AUTO: 3 /HPF — SIGNIFICANT CHANGE UP (ref 0–5)
TROPONIN T, HIGH SENSITIVITY RESULT: 440 NG/L — HIGH (ref 0–51)
UROBILINOGEN FLD QL: 0.2 MG/DL — SIGNIFICANT CHANGE UP (ref 0.2–1)
WBC # BLD: 17.21 K/UL — HIGH (ref 3.8–10.5)
WBC # BLD: 20.09 K/UL — HIGH (ref 3.8–10.5)
WBC # FLD AUTO: 17.21 K/UL — HIGH (ref 3.8–10.5)
WBC # FLD AUTO: 20.09 K/UL — HIGH (ref 3.8–10.5)
WBC UR QL: 122 /HPF — HIGH (ref 0–5)
YEAST-LIKE CELLS: PRESENT

## 2024-10-02 PROCEDURE — 85025 COMPLETE CBC W/AUTO DIFF WBC: CPT

## 2024-10-02 PROCEDURE — 33572 OPEN CORONARY ENDARTERECTOMY: CPT

## 2024-10-02 PROCEDURE — 93010 ELECTROCARDIOGRAM REPORT: CPT

## 2024-10-02 PROCEDURE — 86901 BLOOD TYPING SEROLOGIC RH(D): CPT

## 2024-10-02 PROCEDURE — 93005 ELECTROCARDIOGRAM TRACING: CPT

## 2024-10-02 PROCEDURE — 87086 URINE CULTURE/COLONY COUNT: CPT

## 2024-10-02 PROCEDURE — G0463: CPT

## 2024-10-02 PROCEDURE — 76998 US GUIDE INTRAOP: CPT | Mod: 26,AS,59

## 2024-10-02 PROCEDURE — 76998 US GUIDE INTRAOP: CPT | Mod: 26,59

## 2024-10-02 PROCEDURE — 33572 OPEN CORONARY ENDARTERECTOMY: CPT | Mod: AS

## 2024-10-02 PROCEDURE — 36415 COLL VENOUS BLD VENIPUNCTURE: CPT

## 2024-10-02 PROCEDURE — 88304 TISSUE EXAM BY PATHOLOGIST: CPT | Mod: 26

## 2024-10-02 PROCEDURE — 33518 CABG ARTERY-VEIN TWO: CPT

## 2024-10-02 PROCEDURE — 33518 CABG ARTERY-VEIN TWO: CPT | Mod: AS

## 2024-10-02 PROCEDURE — 87640 STAPH A DNA AMP PROBE: CPT

## 2024-10-02 PROCEDURE — 88311 DECALCIFY TISSUE: CPT | Mod: 26

## 2024-10-02 PROCEDURE — 71046 X-RAY EXAM CHEST 2 VIEWS: CPT

## 2024-10-02 PROCEDURE — 86900 BLOOD TYPING SEROLOGIC ABO: CPT

## 2024-10-02 PROCEDURE — 83036 HEMOGLOBIN GLYCOSYLATED A1C: CPT

## 2024-10-02 PROCEDURE — 87077 CULTURE AEROBIC IDENTIFY: CPT

## 2024-10-02 PROCEDURE — 83735 ASSAY OF MAGNESIUM: CPT

## 2024-10-02 PROCEDURE — 84443 ASSAY THYROID STIM HORMONE: CPT

## 2024-10-02 PROCEDURE — 71045 X-RAY EXAM CHEST 1 VIEW: CPT | Mod: 26

## 2024-10-02 PROCEDURE — 33533 CABG ARTERIAL SINGLE: CPT | Mod: AS

## 2024-10-02 PROCEDURE — 80053 COMPREHEN METABOLIC PANEL: CPT

## 2024-10-02 PROCEDURE — 33508 ENDOSCOPIC VEIN HARVEST: CPT | Mod: 59

## 2024-10-02 PROCEDURE — 81001 URINALYSIS AUTO W/SCOPE: CPT

## 2024-10-02 PROCEDURE — 83880 ASSAY OF NATRIURETIC PEPTIDE: CPT

## 2024-10-02 PROCEDURE — 33533 CABG ARTERIAL SINGLE: CPT

## 2024-10-02 PROCEDURE — 85610 PROTHROMBIN TIME: CPT

## 2024-10-02 PROCEDURE — 86923 COMPATIBILITY TEST ELECTRIC: CPT

## 2024-10-02 PROCEDURE — 84480 ASSAY TRIIODOTHYRONINE (T3): CPT

## 2024-10-02 PROCEDURE — 87641 MR-STAPH DNA AMP PROBE: CPT

## 2024-10-02 PROCEDURE — 85730 THROMBOPLASTIN TIME PARTIAL: CPT

## 2024-10-02 PROCEDURE — 84134 ASSAY OF PREALBUMIN: CPT

## 2024-10-02 PROCEDURE — 84436 ASSAY OF TOTAL THYROXINE: CPT

## 2024-10-02 PROCEDURE — 86850 RBC ANTIBODY SCREEN: CPT

## 2024-10-02 DEVICE — OCCLUDER INTERNAL VESSEL FLO-RESTER 1 X 12MM: Type: IMPLANTABLE DEVICE | Status: FUNCTIONAL

## 2024-10-02 DEVICE — CANNULA ARTERIAL STRAIGHT 20FR X 3/8" VENTED: Type: IMPLANTABLE DEVICE | Status: FUNCTIONAL

## 2024-10-02 DEVICE — CHEST DRAIN THORACIC ARGYLE PVC 28FR RIGHT ANGLE: Type: IMPLANTABLE DEVICE | Status: FUNCTIONAL

## 2024-10-02 DEVICE — CANNULA VESSEL 3MM BLUNT TIP CLEAR 1-WAY VALVE: Type: IMPLANTABLE DEVICE | Status: FUNCTIONAL

## 2024-10-02 DEVICE — KIT CVC 2LUM MAC 9FR CHG: Type: IMPLANTABLE DEVICE | Status: FUNCTIONAL

## 2024-10-02 DEVICE — CANNULA AORTIC ROOT 14G X 14CM FLANGED: Type: IMPLANTABLE DEVICE | Status: FUNCTIONAL

## 2024-10-02 DEVICE — KIT A-LINE 1LUM 20G X 12CM SAFE KIT: Type: IMPLANTABLE DEVICE | Status: FUNCTIONAL

## 2024-10-02 DEVICE — PACING WIRE ORANGE M-25 WINGED WIRE 37MM X 89MM: Type: IMPLANTABLE DEVICE | Status: FUNCTIONAL

## 2024-10-02 DEVICE — SURGIFLO MATRIX WITH THROMBIN KIT: Type: IMPLANTABLE DEVICE | Status: FUNCTIONAL

## 2024-10-02 DEVICE — CANNULA VENOUS 2 STAGE OPEN LIGHTHOUSE TIP 32-40FR X 1/2" NON-VENTED: Type: IMPLANTABLE DEVICE | Status: FUNCTIONAL

## 2024-10-02 DEVICE — PACING WIRE WHITE M-25 WINGED WIRE 37MM X 89MM: Type: IMPLANTABLE DEVICE | Status: FUNCTIONAL

## 2024-10-02 DEVICE — CANNULA RETROGRADE CARDIOPLEGIA SELF-INFLATING 14FR PRE-SHAPED STYLET/HANDLE: Type: IMPLANTABLE DEVICE | Status: FUNCTIONAL

## 2024-10-02 DEVICE — CANNULA ATRASUMP 1/4" X 38CM: Type: IMPLANTABLE DEVICE | Status: FUNCTIONAL

## 2024-10-02 RX ORDER — ACETAMINOPHEN 325 MG
975 TABLET ORAL EVERY 6 HOURS
Refills: 0 | Status: COMPLETED | OUTPATIENT
Start: 2024-10-03 | End: 2024-10-06

## 2024-10-02 RX ORDER — CHLORHEXIDINE GLUCONATE ORAL RINSE 1.2 MG/ML
1 SOLUTION DENTAL
Refills: 0 | Status: DISCONTINUED | OUTPATIENT
Start: 2024-10-02 | End: 2024-10-07

## 2024-10-02 RX ORDER — GABAPENTIN 800 MG/1
100 TABLET, FILM COATED ORAL EVERY 8 HOURS
Refills: 0 | Status: DISCONTINUED | OUTPATIENT
Start: 2024-10-03 | End: 2024-10-05

## 2024-10-02 RX ORDER — PANTOPRAZOLE SODIUM 40 MG/1
40 TABLET, DELAYED RELEASE ORAL DAILY
Refills: 0 | Status: DISCONTINUED | OUTPATIENT
Start: 2024-10-03 | End: 2024-10-07

## 2024-10-02 RX ORDER — ACETAMINOPHEN 325 MG
650 TABLET ORAL EVERY 6 HOURS
Refills: 0 | Status: DISCONTINUED | OUTPATIENT
Start: 2024-10-06 | End: 2024-10-07

## 2024-10-02 RX ORDER — SODIUM CHLORIDE IRRIG SOLUTION 0.9 %
500 SOLUTION, IRRIGATION IRRIGATION ONCE
Refills: 0 | Status: COMPLETED | OUTPATIENT
Start: 2024-10-02 | End: 2024-10-02

## 2024-10-02 RX ORDER — ALCOHOL ANTISEPTIC PADS
25 PADS, MEDICATED (EA) TOPICAL
Refills: 0 | Status: DISCONTINUED | OUTPATIENT
Start: 2024-10-02 | End: 2024-10-03

## 2024-10-02 RX ORDER — VANCOMYCIN HCL-SODIUM CHLORIDE IV SOLN 1.5 GM/250ML-0.9% 1.5-0.9/25 GM/ML-%
1000 SOLUTION INTRAVENOUS EVERY 12 HOURS
Refills: 0 | Status: COMPLETED | OUTPATIENT
Start: 2024-10-03 | End: 2024-10-04

## 2024-10-02 RX ORDER — NOREPINEPHRINE BITARTRATE/D5W 16MG/250ML
0.05 PLASTIC BAG, INJECTION (ML) INTRAVENOUS
Qty: 8 | Refills: 0 | Status: DISCONTINUED | OUTPATIENT
Start: 2024-10-02 | End: 2024-10-03

## 2024-10-02 RX ORDER — ASPIRIN 325 MG
81 TABLET ORAL ONCE
Refills: 0 | Status: COMPLETED | OUTPATIENT
Start: 2024-10-02 | End: 2024-10-02

## 2024-10-02 RX ORDER — DAPAGLIFLOZIN 10 MG/1
1 TABLET, FILM COATED ORAL
Refills: 0 | DISCHARGE

## 2024-10-02 RX ORDER — CHLORHEXIDINE GLUCONATE ORAL RINSE 1.2 MG/ML
15 SOLUTION DENTAL EVERY 12 HOURS
Refills: 0 | Status: DISCONTINUED | OUTPATIENT
Start: 2024-10-02 | End: 2024-10-03

## 2024-10-02 RX ORDER — AMIODARONE HYDROCHLORIDE 50 MG/ML
400 INJECTION, SOLUTION INTRAVENOUS
Refills: 0 | Status: COMPLETED | OUTPATIENT
Start: 2024-10-02 | End: 2024-10-05

## 2024-10-02 RX ORDER — OXYCODONE HYDROCHLORIDE 30 MG/1
10 TABLET, FILM COATED, EXTENDED RELEASE ORAL EVERY 4 HOURS
Refills: 0 | Status: DISCONTINUED | OUTPATIENT
Start: 2024-10-03 | End: 2024-10-07

## 2024-10-02 RX ORDER — PROPOFOL 10 MG/ML
10 INJECTION, EMULSION INTRAVENOUS
Qty: 1000 | Refills: 0 | Status: DISCONTINUED | OUTPATIENT
Start: 2024-10-02 | End: 2024-10-03

## 2024-10-02 RX ORDER — ASPIRIN 325 MG
81 TABLET ORAL DAILY
Refills: 0 | Status: DISCONTINUED | OUTPATIENT
Start: 2024-10-03 | End: 2024-10-07

## 2024-10-02 RX ORDER — SENNOSIDES 8.6 MG
2 TABLET ORAL AT BEDTIME
Refills: 0 | Status: DISCONTINUED | OUTPATIENT
Start: 2024-10-03 | End: 2024-10-07

## 2024-10-02 RX ORDER — FERROUS SULFATE 325(65) MG
0 TABLET ORAL
Refills: 0 | DISCHARGE

## 2024-10-02 RX ORDER — BISACODYL 5 MG/1
10 TABLET, COATED ORAL ONCE
Refills: 0 | Status: DISCONTINUED | OUTPATIENT
Start: 2024-10-04 | End: 2024-10-07

## 2024-10-02 RX ORDER — PANTOPRAZOLE SODIUM 40 MG/1
40 TABLET, DELAYED RELEASE ORAL ONCE
Refills: 0 | Status: COMPLETED | OUTPATIENT
Start: 2024-10-02 | End: 2024-10-03

## 2024-10-02 RX ORDER — SODIUM CHLORIDE 0.9 % (FLUSH) 0.9 %
1000 SYRINGE (ML) INJECTION
Refills: 0 | Status: DISCONTINUED | OUTPATIENT
Start: 2024-10-02 | End: 2024-10-05

## 2024-10-02 RX ORDER — NEBIVOLOL 20 MG/1
1 TABLET ORAL
Refills: 0 | DISCHARGE

## 2024-10-02 RX ORDER — INSULIN REGULAR, HUMAN 100/ML
2 VIAL (ML) INJECTION
Qty: 100 | Refills: 0 | Status: DISCONTINUED | OUTPATIENT
Start: 2024-10-02 | End: 2024-10-03

## 2024-10-02 RX ORDER — LISINOPRIL 10 MG/1
1 TABLET ORAL
Refills: 0 | DISCHARGE

## 2024-10-02 RX ORDER — SODIUM CHLORIDE 0.9 % (FLUSH) 0.9 %
3 SYRINGE (ML) INJECTION EVERY 8 HOURS
Refills: 0 | Status: DISCONTINUED | OUTPATIENT
Start: 2024-10-02 | End: 2024-10-02

## 2024-10-02 RX ORDER — ALCOHOL ANTISEPTIC PADS
50 PADS, MEDICATED (EA) TOPICAL
Refills: 0 | Status: DISCONTINUED | OUTPATIENT
Start: 2024-10-02 | End: 2024-10-07

## 2024-10-02 RX ORDER — OXYCODONE HYDROCHLORIDE 30 MG/1
5 TABLET, FILM COATED, EXTENDED RELEASE ORAL EVERY 4 HOURS
Refills: 0 | Status: DISCONTINUED | OUTPATIENT
Start: 2024-10-03 | End: 2024-10-07

## 2024-10-02 RX ORDER — ATORVASTATIN CALCIUM 10 MG/1
80 TABLET, FILM COATED ORAL AT BEDTIME
Refills: 0 | Status: DISCONTINUED | OUTPATIENT
Start: 2024-10-02 | End: 2024-10-07

## 2024-10-02 RX ORDER — ONDANSETRON HCL/PF 4 MG/2 ML
4 VIAL (ML) INJECTION EVERY 6 HOURS
Refills: 0 | Status: DISCONTINUED | OUTPATIENT
Start: 2024-10-02 | End: 2024-10-07

## 2024-10-02 RX ORDER — EZETIMIBE 10 MG/1
1 TABLET ORAL
Refills: 0 | DISCHARGE

## 2024-10-02 RX ORDER — GABAPENTIN 100 MG
0 CAPSULE ORAL
Refills: 0 | DISCHARGE

## 2024-10-02 RX ADMIN — Medication 100 MILLIEQUIVALENT(S): at 19:45

## 2024-10-02 RX ADMIN — Medication 100 MILLIGRAM(S): at 21:21

## 2024-10-02 RX ADMIN — Medication 3000 MILLILITER(S): at 19:05

## 2024-10-02 NOTE — BRIEF OPERATIVE NOTE - NSICDXBRIEFPROCEDURE_GEN_ALL_CORE_FT
PROCEDURES:  CABG, with SHALA 02-Oct-2024 17:09:34 CABG x3 (LIMA-LAD, SVG-OM, SVG-PDA) endarectomy PDA & Gatito Piña  Open coronary endarterectomy 02-Oct-2024 17:13:12 JO CANDELARIA Richard

## 2024-10-02 NOTE — BRIEF OPERATIVE NOTE - NS MD BRIEF OPTUBE MEDIASTINAL NUM
PATIENT TRANSFERRED TO 
[facility]  Patient Demographics Patient Name Babs Torres, [address] 
[id number] Sex Male  
1965 Address [address] Phone [phone] (Home) *Preferred* Discharge Information Discharge Provider 
 
Date/Time Disposition Destination Clayton Tyler MD / [phone] 20 1200  (none) Comments (none) Discharge Summary Notes Discharge Summary by Cassandra Gasca MD at 20 1613  Version 2 of 2 Author: Cassandra Gasca MD Service: Pulmonary Disease Author Type: Physician Filed: 20 165 Date of Service: 20 1613 Status: Addendum : Cassandra Gasca MD (Physician) Related Notes: Original Note by Carlos Greenberg NP (Nurse Practitioner) filed at 20 8843 Shania Alvarenga untitled image Death Summary [address] [place] [address] Admission date:  2020 Discharge date:  2020 Admitting Diagnosis:  Acute metabolic encephalopathy [X78.84] Seizure ([facility] [R56.9] Discharge Diagnosis:   
 
  
 
          
 
Problem List as of 2020 Date Reviewed: 2020 Codes Class Noted - Resolved Anoxic encephalopathy ([facility] ICD-10-CM: G93.1 ICD-9-CM: 348.1 2020 - Present Pulmonary infiltrate ICD-10-CM: R91.8 ICD-9-CM: 793.19  
 
   
 
2/15/2020 - Present Cardiac arrest ([facility] ICD-10-CM: I46.9 ICD-9-CM: 427.5 2020 - Present Acute respiratory failure with hypoxia ([facility] ICD-10-CM: J96.01 
 
ICD-9-CM: 518.81  
 
   
 
2020 - Present Hypotension ICD-10-CM: I95.9 ICD-9-CM: 458.9 2020 - Present * (Principal) Acute metabolic encephalopathy ICD-10-CM: G93.41 
 
ICD-9-CM: 348.31  
 
   
 
2/11/2020 - Present Seizure ([place] [address] ICD-10-CM: R56.9 ICD-9-CM: 780.39  
 
   
 
2/11/2020 - Present Intractable headache ICD-10-CM: R46 ICD-9-CM: 784.0  
 
   
 
2/8/2020 - Present Other dysphagia ICD-10-CM: R13.19 ICD-9-CM: 787.29  
 
   
 
2/6/2020 - Present New onset seizure ([place] [address] ICD-10-CM: R56.9 ICD-9-CM: 780.39  
 
   
 
2/5/2020 - Present Hypokalemia ICD-10-CM: E87.6 ICD-9-CM: 276.8  
 
   
 
2/5/2020 - Present Dehydration ICD-10-CM: E86.0 ICD-9-CM: 276.51  
 
   
 
2/2/2020 - Present COPD exacerbation ([place] [address] ICD-10-CM: J44.1 ICD-9-CM: 491.21  
 
   
 
11/27/2019 - Present Bacterial pneumonia ICD-10-CM: J15.9 ICD-9-CM: 482.9  
 
   
 
11/27/2019 - Present Chemotherapy-induced neutropenia (HCC) ICD-10-CM: D70.1, T45.1X5A 
 
ICD-9-CM: 288.03, E933.1  
 
   
 
11/25/2019 - Present Malignant neoplasm of lower third of esophagus (HCC) ICD-10-CM: C15.5 ICD-9-CM: 150.5 2/19/2019 - Present Panlobular emphysema ([place] [address] ICD-10-CM: J43.1 ICD-9-CM: 492.8  
 
   
 
1/5/2018 - Present Obesity, morbid ([facility] ICD-10-CM: E66.01 
 
ICD-9-CM: 278.01  
 
   
 
12/13/2017 - Present Obstructive sleep apnea syndrome ICD-10-CM: W17.68 
 
ICD-9-CM: 327.23  
 
   
 
4/10/2017 - Present Pure hypercholesterolemia ICD-10-CM: E78.00 ICD-9-CM: 272.0  
 
   
 
8/17/2016 - Present Chronic gout without tophus ICD-10-CM: M1A. 9XX0 
 
ICD-9-CM: 274.02  
 
   
 
 8/17/2016 - Present Peripheral polyneuropathy ICD-10-CM: G62.9 ICD-9-CM: 356.9 8/17/2016 - Present Essential hypertension with goal blood pressure less than 140/90 ICD-10-CM: I10 
 
ICD-9-CM: 401.9 8/17/2016 - Present Hypotestosteronemia ICD-10-CM: E34.9 ICD-9-CM: 259.9  
 
   
 
12/29/2015 - Present RESOLVED: Acute respiratory failure with hypoxia (HCC) ICD-10-CM: J96.01 
 
ICD-9-CM: 518.81  
 
   
 
11/27/2019 - 2/14/2020 RESOLVED: Hypertension, essential  
 
ICD-10-CM: I10 
 
ICD-9-CM: 401.9  
 
   
 
12/29/2015 - 9/5/2017 RESOLVED: Hyperlipidemia ICD-10-CM: E78.5 ICD-9-CM: 272.4  
 
   
 
12/29/2015 - 9/5/2017 Consultants: Neurology, Medical Oncology, Palliative Care Studies/Procedures:  CT head, CXR, MRI brain, Blood cultures Hospital course: 
 
  
 
60 y.o.  male, PMH HTN, COPD, and esophageal cancer getting chemo last treatment 4 weeks ago and radiation last treatment one week ago with tube feedings who presented to the ED for c/o headache and seizure like activity. Originally admitted to [facility] and transferred to [facility] on 2/11/20. EEG originally done that did not show actual seizures. Tele neuro recommended Keppra. 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 Patient will have episodes of unresponsiveness. Neuroimaging benign. The patient has continued to require mechanical ventilation. EEG was performed today and found to demonstrate brain death in absence of barbituates. Family decided to compassionately extubate patient. The patient passed away at [phone]. Final: 
 
  
 
--Pronounced dead at 4:13 pm 
 
  
 
--Total discharge greater than 30 minutes in duration. Messi Lyon MD 
 
  
 
  
  
  
 
 
 
 
Discharge Summary by Ida Agrawal NP at 02/17/20 1613  Version 1 of 2 Author: Ida Agrawal NP Service: Pulmonary Disease Author Type: Nurse Practitioner Filed: 02/17/20 1620 Date of Service: 02/17/20 1613 Status: Signed : Ida Agrawal NP (Nurse Practitioner) Related Notes: Addendum by Deborah Celeste MD (Physician) filed at 02/17/20 0693 Cosigner: Deborah Celeste MD at 02/17/20 1651 Tania Carlos untitled image Death Summary [address] [place] [address] Admission date:  2/11/2020 Discharge date:  2/17/2020 Admitting Diagnosis:  Acute metabolic encephalopathy [A16.04] Seizure ([facility] [R56.9] Discharge Diagnosis:   
 
          
 
Problem List as of 2/17/2020 Date Reviewed: 2/17/2020 Codes Class Noted - Resolved [facility] ([facility] ICD-10-CM: G93.1 ICD-9-CM: 348.1 2/16/2020 - Present Pulmonary infiltrate ICD-10-CM: R91.8 ICD-9-CM: 793.19  
 
   
 
2/15/2020 - Present Cardiac arrest ([facility] ICD-10-CM: I46.9 ICD-9-CM: 427.5 2/14/2020 - Present Acute respiratory failure with hypoxia ([facility] ICD-10-CM: J96.01 
 
ICD-9-CM: 518.81  
 
   
 
2/14/2020 - Present Hypotension ICD-10-CM: I95.9 ICD-9-CM: 458.9 2/14/2020 - Present * (Principal) Acute metabolic encephalopathy ICD-10-CM: G93.41 
 
ICD-9-CM: 348.31  
 
   
 
2/11/2020 - Present Seizure ([facility] ICD-10-CM: R56.9 ICD-9-CM: 780.39  
 
   
 
2/11/2020 - Present Intractable headache ICD-10-CM: R46 ICD-9-CM: 784.0  
 
   
 
2/8/2020 - Present Other dysphagia ICD-10-CM: R13.19 ICD-9-CM: 787.29  
 
   
 2/6/2020 - Present New onset seizure ([place] [address] ICD-10-CM: R56.9 ICD-9-CM: 780.39  
 
   
 
2/5/2020 - Present Hypokalemia ICD-10-CM: E87.6 ICD-9-CM: 276.8  
 
   
 
2/5/2020 - Present Dehydration ICD-10-CM: E86.0 ICD-9-CM: 276.51  
 
   
 
2/2/2020 - Present COPD exacerbation ([place] [address] ICD-10-CM: J44.1 ICD-9-CM: 491.21  
 
   
 
11/27/2019 - Present Bacterial pneumonia ICD-10-CM: J15.9 ICD-9-CM: 482.9  
 
   
 
11/27/2019 - Present Chemotherapy-induced neutropenia (HCC) ICD-10-CM: D70.1, T45.1X5A 
 
ICD-9-CM: 288.03, E933.1  
 
   
 
11/25/2019 - Present Malignant neoplasm of lower third of esophagus (HCC) ICD-10-CM: C15.5 ICD-9-CM: 150.5 2/19/2019 - Present Panlobular emphysema ([place] [address] ICD-10-CM: J43.1 ICD-9-CM: 492.8  
 
   
 
1/5/2018 - Present Obesity, morbid ([place] [address] ICD-10-CM: E66.01 
 
ICD-9-CM: 278.01  
 
   
 
12/13/2017 - Present Obstructive sleep apnea syndrome ICD-10-CM: P99.47 
 
ICD-9-CM: 327.23  
 
   
 
4/10/2017 - Present Pure hypercholesterolemia ICD-10-CM: E78.00 ICD-9-CM: 272.0  
 
   
 
8/17/2016 - Present Chronic gout without tophus ICD-10-CM: M1A. 9XX0 
 
ICD-9-CM: 274.02  
 
   
 
8/17/2016 - Present Peripheral polyneuropathy ICD-10-CM: G62.9 ICD-9-CM: 356.9 8/17/2016 - Present Essential hypertension with goal blood pressure less than 140/90 ICD-10-CM: I10 
 
ICD-9-CM: 401.9 8/17/2016 - Present Hypotestosteronemia ICD-10-CM: E34.9 ICD-9-CM: 259.9  
 
   
 
12/29/2015 - Present RESOLVED: Acute respiratory failure with hypoxia (HCC) ICD-10-CM: J96.01 
 
ICD-9-CM: 518.81  
 
   
 
11/27/2019 - 2/14/2020 RESOLVED: Hypertension, essential  
 
ICD-10-CM: I10 
 
ICD-9-CM: 401.9  
 
   
 
12/29/2015 - 9/5/2017 RESOLVED: Hyperlipidemia ICD-10-CM: E78.5 ICD-9-CM: 272.4  
 
   
 
12/29/2015 - 9/5/2017 Consultants: Neurology, Medical Oncology, Palliative Care Studies/Procedures:  CT head, CXR, MRI brain, Blood cultures Hospital course: 
 
  
 
60 y.o.  male, PMH HTN, COPD, and esophageal cancer getting chemo last treatment 4 weeks ago and radiation last treatment one week ago with tube feedings who presented to the ED for c/o headache and seizure like activity. Originally admitted to [facility] and transferred to [facility] on 2/11/20. EEG originally done that did not show actual seizures. Tele neuro recommended Keppra. 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 Patient will have episodes of unresponsiveness. Neuroimaging benign. The patient has continued to require mechanical ventilation. EEG was performed today and found to demonstrate brain death in absence of barbituates. Family decided to compassionately extubate patient. The patient passed away at [phone]. Final: 
 
--Pronounced dead at 1613. --Total discharge greater than 30 minutes in duration. Carmine Smith NP Discharge Summary by Shante Wolf MD at 02/11/20 1140  Version 1 of 1 Author: Shante Wolf MD Service: Internal Medicine Author Type: Physician Filed: 02/11/20 1143 Date of Service: 02/11/20 1140 Status: Signed : Shante Wolf MD (Physician) Expand All Collapse All Discharge Summary Patient: Kirk Fleming MRN: [id number] SSN: [id number] YOB: 1965 Age: 60 y.o. Sex: male Admit Date: 2/5/2020 Discharge Date: 2/11/2020 Admission Diagnoses: New onset seizure ([facility] [R56.9] New onset seizure ([facility] [R56.9] Intractable headache [R51] Discharge Diagnoses:  
 
          
 
Problem List as of 2/11/2020 Date Reviewed: 1/31/2020 Codes Class Noted - Resolved Acute metabolic encephalopathy ICD-10-CM: G93.41 
 
ICD-9-CM: 348.31  
 
   
 
2/11/2020 - Present Intractable headache ICD-10-CM: R46 ICD-9-CM: 784.0  
 
   
 
2/8/2020 - Present Other dysphagia ICD-10-CM: R13.19 ICD-9-CM: 787.29  
 
   
 
2/6/2020 - Present * (Principal) New onset seizure ([facility] ICD-10-CM: R56.9 ICD-9-CM: 780.39  
 
   
 
2/5/2020 - Present Hypokalemia ICD-10-CM: E87.6 ICD-9-CM: 276.8  
 
   
 
2/5/2020 - Present Dehydration ICD-10-CM: E86.0 ICD-9-CM: 276.51  
 
   
 
2/2/2020 - Present COPD exacerbation ([facility] ICD-10-CM: J44.1 ICD-9-CM: 491.21  
 
   
 
11/27/2019 - Present Acute respiratory failure with hypoxia ([facility] ICD-10-CM: J96.01 
 
ICD-9-CM: 518.81  
 
   
 
11/27/2019 - Present Bacterial pneumonia ICD-10-CM: J15.9 ICD-9-CM: 482.9  
 
   
 
11/27/2019 - Present Chemotherapy-induced neutropenia (HCC) ICD-10-CM: D70.1, T45.1X5A 
 
ICD-9-CM: 288.03, E933.1  
 
   
 
11/25/2019 - Present Malignant neoplasm of lower third of esophagus (HCC) ICD-10-CM: C15.5 ICD-9-CM: 150.5 2/19/2019 - Present Panlobular emphysema ([facility] ICD-10-CM: J43.1 ICD-9-CM: 492.8  
 
   
 
1/5/2018 - Present Obesity, morbid ([address] ICD-10-CM: E66.01 
 
ICD-9-CM: 278.01  
 
   
 
12/13/2017 - Present Obstructive sleep apnea syndrome ICD-10-CM: L38.48 
 
ICD-9-CM: 327.23  
 
   
 
4/10/2017 - Present Pure hypercholesterolemia ICD-10-CM: E78.00 ICD-9-CM: 272.0  
 
   
 
8/17/2016 - Present Chronic gout without tophus ICD-10-CM: M1A. 9XX0 
 
ICD-9-CM: 274.02  
 
   
 
8/17/2016 - Present Peripheral polyneuropathy ICD-10-CM: G62.9 ICD-9-CM: 356.9 8/17/2016 - Present Essential hypertension with goal blood pressure less than 140/90 ICD-10-CM: I10 
 
ICD-9-CM: 401.9 8/17/2016 - Present Hypotestosteronemia ICD-10-CM: E34.9 ICD-9-CM: 259.9  
 
   
 
12/29/2015 - Present RESOLVED: Hypertension, essential  
 
ICD-10-CM: I10 
 
ICD-9-CM: 401.9  
 
   
 
12/29/2015 - 9/5/2017 RESOLVED: Hyperlipidemia ICD-10-CM: E78.5 ICD-9-CM: 272.4  
 
   
 
12/29/2015 - 9/5/2017 Discharge Condition: Stable Hospital Course:  
 
Patient originally was admitted to [facility] on 2/5/2020. He has history of esophageal cancer, on radiation therapy, COPD, on tube feeding. Admitted due to headache. His headache was severe at times. Patient lost a lot of weight. Patient also had episodes of seizure-like activities. Patient continues to have headache from time to time. Alternating with episodes of unresponsiveness. EEG was done which did not show seizure focus but telemetry neurologist recommended starting him on Keppra. Neurological imaging did not show lesions in the brain.   Although there is question about paraneoplastic syndrome . The plan now is for patient to have lumbar puncture to assess CSF As of yesterday, patient has episodes of unresponsiveness, did not respond to sternal rub,  rapid response was called. Code as was activated. Patient had CT scan. During CT scan, patient also had CODE BLUE called although upon verification patient did not lose pulse, but just had erratic breathing. CT head showing nonspecific cerebellar changes. Patient has been monitored overnight. Patient is being transferred to [facility]. Physical Exam: 
 
  
 
General:                    The patient is a middle aged male in no acute respiratory  distress. Patient is lethargic. Mostly sleeping. Responded to stimuli by opening eyes, no verbal responses. .  
 
Head:                                   bruise to back of head Eyes:                                   palpebral pallor, no scleral icterus. ENT:                                    External auricular and nasal exam within normal limits. Mucous membranes are moist. 
 
Neck:                                   Supple, non-tender, no JVD. Lungs:                       decreased to auscultation bilaterally without wheezes or crackles. No respiratory distress or accessory muscle use. Heart:                                  Regular rate and rhythm, without murmurs, rubs, or gallops. Abdomen:                  Soft, non-tender, non-distended with normoactive bowel sounds. Genitourinary:           No tenderness over the bladder or bilateral CVAs. Maxwell catheter in place Extremities:               Without clubbing, cyanosis, or edema. Skin:                                    Normal color, texture, and turgor. No rashes, lesions, or jaundice.  
 
Pulses:                      Radial and dorsalis pedis pulses present 2+ bilaterally. Capillary refill <2s. Neurologic:               lethargic. No obvious neuro motor deficit. Consults: Neurology Significant Diagnostic Studies: CT perfusion 2- IMPRESSION: Hypoperfusion within the cerebellar hemispheres. There is diffuse 
 
global Tmax>4s within the supra and infratentorial brain which may indicate 
 
chronic oligemia versus artifact. CTA  
 
2- IMPRESSION: 
 
1. Negative CTA exam of the major cervical arterial vasculature for significant 
 
stenosis or occlusion. 2. Negative CTA exam of the major intracranial arterial vasculature for 
 
significant stenosis, occlusion, or aneurysms. 3. Similar left cervical and visualized mediastinal adenopathy, suggesting 
 
progression of the patient's known esophageal cancer. CT brain 2- IMPRESSION: 
 
  
 
1. No definite noncontrast CT evidence of acute intracranial abnormality within 
 
the limits of patient motion. 2. If there are persistent or progressive symptoms, a repeat exam should be 
 
obtained when the patient is better able to lie still. CT brain 2-8-2020 Impression:  No acute intracranial abnormality. Other chronic findings as 
 
Above. XR chest  
 
2-5-2020 IMPRESSION:  Negative for acute change. MRI brain 2- Impression: 1. No evidence of acute infarction. 2. Mild chronic small vessel ischemic change. Recent Results Disposition: acute care facility Discharge Medications:  
 
     
 
Current Discharge Medication List  
 
 
   
 
 
     
 
CONTINUE these medications which have NOT CHANGED Details Lactose-Free Food with Fiber (JEVITY 1.5 MELITON) 0.06 gram-1.5 kcal/mL liqd 6 Bottles by PEG Tube route daily for 100 days. Bolus Feeds, goal 6/day to provide 2130 kcal, 91 gm pro, 1080 ml. Needs additional 36 oz/day for hydration. JESSICA > 3 months. Indications: blockage of the esophagus, dysphagia Qty: 180 Bottle, Refills: 4  
 
 
   
 
 
midodrine (PROAMITINE) 5 mg tablet Take 1 Tab by mouth two (2) times daily (with meals) for 30 days. Indications: a feeling of dizziness upon standing due to a drop in blood pressure Qty: 60 Tab, Refills: 1  
 
 
   
 
 
sucralfate (CARAFATE) 1 gram tablet Take 1 Tab by mouth four (4) times daily. Qty: 60 Tab, Refills: 0  
 
 
   
 
 
artificial saliva (MOUTH KOTE) spra Take 1 Spray by mouth as needed for Pain. Qty: 240 mL, Refills: 1  
 
 
   
 
 
acyclovir (ZOVIRAX) 5 % ointment Apply thin layer of cream to effected areas 4-5- times a day 
 
Qty: 5 g, Refills: 3  
 
 
   
 
 
potassium chloride (KAON 20%) 40 mEq/15 mL liqd Daily X 2 days through his feeding tube  Indications: prevention of low potassium in the blood Qty: 120 mL, Refills: 0  
 
 
   
 
 
cpap machine kit  
 
by Does Not Apply route. Oxygen  
 
at bedtime as directed for dose pack.  
 
 
   
 
 
ezetimibe (ZETIA) 10 mg tablet Take 10 mg by mouth daily. allopurinol (ZYLOPRIM) 100 mg tablet Take 1 Tab by mouth daily. Qty: 90 Tab, Refills: 3 Associated Diagnoses: Chronic gout without tophus, unspecified cause, unspecified site  
 
 
   
 
 
metoprolol tartrate (LOPRESSOR) 50 mg tablet Take 1 Tab by mouth two (2) times a day. Qty: 180 Tab, Refills: 3 cholecalciferol, VITAMIN D3, (VITAMIN D3) 5,000 unit tab tablet Take 5,000 Units by mouth daily. STOP taking these medications  
 
 
   
 
 
   
 
oxyCODONE IR (ROXICODONE) 10 mg tab immediate release tablet Comments:  
 
Reason for Stopping:   
 
 
   
 
   
 
 
   
 
 
  
 
  
 
Activity: Activity as tolerated Diet: feeding via tube Wound Care: Keep wound clean and dry No orders of the defined types were placed in this encounter. I have discussed the plan of care with patient and spouse at bedside. Time spent on discharge is 37 minutes. Signed By:  
 
Laurin Duverney, MD   
 
 
   
 
February 11, 2020 2

## 2024-10-02 NOTE — PATIENT PROFILE ADULT - LEGAL HELP
BRONCHOSPASM/BRONCHOCONSTRICTION     [x]         IMPROVE AERATION/BREATH SOUNDS  [x]   ADMINISTER BRONCHODILATOR THERAPY AS APPROPRIATE  [x]   ASSESS BREATH SOUNDS  []   IMPLEMENT AEROSOL/MDI PROTOCOL  [x]   PATIENT EDUCATION AS NEEDED no

## 2024-10-02 NOTE — BRIEF OPERATIVE NOTE - NS MD BRIEF OP TUBES PLEURAL
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 SHAILA presented in Oct 2021 with 1 year history of jerks out of sleep and also during the day if eyes are closed. Routine EEG then was normal. He [place] [facility] [place] cluster of seizures in Nov 2021. VEEG captured seizures. SHAILA was loaded with Keppra and was sent home on Keppra 500 mg b.i.d. SHAILA had a normal Brain MRI. Genetic testing / Epilepsy panel was unremarkable. He was seen by Dr. Morris on 12/22/2021. AEEG 3/9/2022 normal.   Last visit 04/26/2024  TEB SHAILA remains on Keppra 500 mg bid; he has no complaints; he denies side effects; he denies seizures, myoclonic jerks or staring spells. He is currently on spring break back at home and will be returning to [facility] on 5/1/24. He will return from [place] during summer 2024.  Keppra 7/2/24 18.3 (10-40) ___________________  08/12/2024  follow up Above reviewed with SHAILA LINTON is doing well; will be returning to [place] on 9/2/24 for another year SHAILA denies seizures; remains on Keppra 500 mg bid; no side effects SHAILA is driving Left

## 2024-10-02 NOTE — PATIENT PROFILE ADULT - FALL HARM RISK - HARM RISK INTERVENTIONS

## 2024-10-02 NOTE — CONSULT NOTE ADULT - SUBJECTIVE AND OBJECTIVE BOX
Patient is a 64y old  Male who presents with a chief complaint of triple vessel CAD      HPI:  Pt is a  64 year old male with past medical history of HTN, HLD, type 2 diabetes mellitus, CVA (residual left side weakness), carotid disease, BPH, coronary artery disease with PCI to LCx in 2006.  Last  cardiac catheterization revealed multivessel coronary artery disease. Pt reports shortness of breath, exertional dyspnea. Pt  denies chest pain, denies cornell  palpitation, syncope, peripheral edema and any other related issues. Pt scheduled for CABG x 3 on 10/02/24 with Dr. Figueroa  (20 Sep 2024 15:36)      PAST MEDICAL & SURGICAL HISTORY:  Hypertension  History of hyperlipidemia  CAD S/P percutaneous coronary angioplasty  7/10 5 stents and 7/14 one stent  Neuropathy  Type 2 diabetes mellitus  BPH (benign prostatic hyperplasia)  CAD (coronary artery disease)  6 stents in the past 5 2010 and 1 in 2014  H/O laminectomy 2015      PREVIOUS DIAGNOSTIC TESTING:      ECHO  FINDINGS:   < from: TTE W or WO Ultrasound Enhancing Agent (09.10.24 @ 22:11) >  CONCLUSIONS:   1. Left ventricular cavity is normal in size. Left ventricular systolic function is normal with an ejection fraction of 57 % by Lewis's method of disks with an ejection fraction visually estimated at 55 to 60 %.   2. Normal left and right atrial size.   3. No significant valvular disease.   4. No pericardial effusion seen.   5. Estimated pulmonary artery systolic pressure is 7 mmHg.   6. No prior echocardiogram is available for comparison.   7. The interatrial septum appears intact.    ________________________________________________________________________________________    CATHETERIZATION FINDINGS:  < from: Cardiac Catheterization (09.10.24 @ 16:52) >  Diagnostic Findings:     Coronary Angiography   The coronary circulation is right dominant.      Patient: LAURIE SIMON             MRN: [id number]  Study Date: 09/10/2024   04:52 PM      Page 1 of 4    LM   Distal left main: There is an 80 % stenosis.      LAD   Ostial left anterior descending: There is an 80 % stenosis. Proximal  left anterior descending: The previously placed stent is a  drug-eluting stent and is patent. Mid left anterior descending: There  is a 90 % in-stent restenosis.    CX   Ostial circumflex: There is a 90 % stenosis. Second obtuse marginal:  There is a 90 % stenosis.    RCA   Proximal right coronary artery: The distal vessel is supplied by  moderate collaterals from the LAD septal  segments.  There is a 100 % stenosis.      Left Heart Cath   Left ventricular function was assessed and demonstrates normal LV wall  motion. Global left ventricular function is normal.  Ejection fraction was calculated with a value of 70%. LV to AO  pullback was performed and there is no pressure gradient. LHC  performed: Aortic valve crossed and left ventricular pressures were  obtained.       Allergies  shellfish (Rash)  No Known Drug Allergies      MEDICATIONS  (HOME):  · 	aspirin 81 mg oral tablet: Last Dose Taken:  , 1 tab(s) orally once a day  · 	Fish Oil 1000 mg oral capsule: Last Dose Taken:  ,  orally once daily  · 	lisinopril 40 mg oral tablet: Last Dose Taken:  , 1 tab(s) orally once a day  · 	multivitamin: Last Dose Taken:  ,   once daily  · 	gabapentin 300 mg oral tablet: Last Dose Taken:  , orally 2 times a day  · 	metFORMIN 1000 mg oral tablet: Last Dose Taken:  , 1 tab(s) orally 2 times a day  · 	atorvastatin 40 mg oral tablet: Last Dose Taken:  , 1 tab(s) orally once a day  · 	tamsulosin 0.4 mg oral capsule: Last Dose Taken:  , 1 cap(s) orally once a day  · 	clopidogrel 75 mg oral tablet: Last Dose Taken:  , 1 tab(s) orally once a day  · 	ezetimibe 10 mg oral tablet: Last Dose Taken:  , 1 tab(s) orally once a day  · 	Nebivolol 20 mg oral tablet: Last Dose Taken:  , 1 tab(s) orally once a day  · 	DULoxetine 30 mg oral delayed release capsule: Last Dose Taken:  ,  orally once daily  · 	Farxiga 10 mg oral tablet: Last Dose Taken:  , 1 tab(s) orally once a day  · 	slow release iron: Last Dose Taken:  , daily    FAMILY HISTORY:  Family history of type 2 diabetes mellitus (Mother)    FH: stroke (Father)    CIGARETTES: NEVER    ALCOHOL: DENIES      REVIEW OF SYSTEMS:  CONSTITUTIONAL: No fever, weight loss, or fatigue  EYES: No eye pain, visual disturbances, or discharge  ENMT:  No difficulty hearing, tinnitus, vertigo; No sinus or throat pain  NECK: No pain or stiffness  RESPIRATORY: No cough, wheezing, chills or hemoptysis; No Shortness of Breath  CARDIOVASCULAR: No chest pain, palpitations, passing out, dizziness, or leg swelling  GASTROINTESTINAL: No abdominal or epigastric pain. No nausea, vomiting, or hematemesis; No diarrhea or constipation. No melena or hematochezia.  GENITOURINARY: No dysuria, frequency, hematuria, or incontinence  NEUROLOGICAL: No headaches, memory loss, loss of strength, numbness, or tremors  SKIN: No itching, burning, rashes, or lesions   ENDOCRINE: No heat or cold intolerance; No hair loss  MUSCULOSKELETAL: No joint pain or swelling; No muscle, back, or extremity pain  PSYCHIATRIC: No depression, anxiety, mood swings, or difficulty sleeping  HEME/LYMPH: No easy bruising, or bleeding gums  ALLERY AND IMMUNOLOGIC: No hives or eczema	    Vital Signs Last 24 Hrs  T(C): 37.4 (03 Oct 2024 00:00), Max: 37.6 (02 Oct 2024 22:15)  T(F): 99.3 (03 Oct 2024 00:00), Max: 99.7 (02 Oct 2024 22:15)  HR: 74 (03 Oct 2024 00:00) (69 - 88)  BP: 112/67 (02 Oct 2024 09:54) (112/67 - 112/67)  BP(mean): --  RR: 16 (03 Oct 2024 00:00) (0 - 20)  SpO2: 100% (03 Oct 2024 00:00) (99% - 100%)        Daily Height in cm: 177.8 (02 Oct 2024 09:54)    Daily     I&O's Detail    02 Oct 2024 07:01  -  03 Oct 2024 00:18  --------------------------------------------------------  IN:    Cryoprecipitate: 126 mL    Insulin: 26 mL    IV PiggyBack: 50 mL    IV PiggyBack: 50 mL    Lactated Ringers Bolus: 500 mL    Norepinephrine: 59.9 mL    PRBCs (Packed Red Blood Cells): 317 mL    Propofol: 56.1 mL    sodium chloride 0.9%: 35 mL    sodium chloride 0.9%: 70 mL  Total IN: 1290 mL    OUT:    Chest Tube (mL): 270 mL    Chest Tube (mL): 650 mL    Voided (mL): 1035 mL  Total OUT: 1955 mL    Total NET: -665 mL      PHYSICAL EXAM:  Appearance: Intubated on vent	  HEENT:   Normal oral mucosa, PERRL, EOMI, sclera non-icteric	  Cardiovascular: Normal S1 S2, No JVD, No cardiac murmurs, No carotid bruits, No peripheral edema  Respiratory: Lungs clear to auscultation	  Gastrointestinal:  Soft, Non-tender, + BS, no bruits	  Skin: No rashes, No ecchymoses, No cyanosis  Neurologic: Grossly non-focal with full strength in all four extremities  Extremities: Normal range of motion, No clubbing, cyanosis or edema  Vascular: Peripheral pulses bilaterally      INTERPRETATION OF TELEMETRY: NSR    LABS:                        10.6   17.21 )-----------( 248      ( 02 Oct 2024 21:09 )             31.6     10-02    138  |  105  |  11.2  ----------------------------<  185[H]  4.8   |  20.0[L]  |  0.70    Ca    8.4      02 Oct 2024 17:40    TPro  4.7[L]  /  Alb  3.0[L]  /  TBili  0.6  /  DBili  x   /  AST  33  /  ALT  18  /  AlkPhos  38[L]  10-02    CARDIAC MARKERS ( 02 Oct 2024 17:40 )  x     / x     / x     / x     / 25.2 ng/mL      PT/INR - ( 02 Oct 2024 21:09 )   PT: 12.1 sec;   INR: 1.07 ratio         PTT - ( 02 Oct 2024 21:09 )  PTT:26.6 sec  Urinalysis Basic - ( 02 Oct 2024 17:40 )    Color: x / Appearance: x / SG: x / pH: x  Gluc: 185 mg/dL / Ketone: x  / Bili: x / Urobili: x   Blood: x / Protein: x / Nitrite: x   Leuk Esterase: x / RBC: x / WBC x   Sq Epi: x / Non Sq Epi: x / Bacteria: x      I&O's Summary    02 Oct 2024 07:01  -  03 Oct 2024 00:18  --------------------------------------------------------  IN: 1290 mL / OUT: 1955 mL / NET: -665 mL      Assessment:  Pt is a  64 year old male with past medical history of HTN, HLD, type 2 diabetes mellitus, CVA (residual left side weakness), carotid disease, BPH, coronary artery disease with PCI to LCx in 2006.  Last  cardiac catheterization revealed multivessel coronary artery disease. Pt reports shortness of breath, exertional dyspnea. Pt  denies chest pain, denies cornell  palpitation, syncope, peripheral edema and any other related issues. Pt scheduled for CABG x 3 on 10/02/24 with Dr. Figueroa  (20 Sep 2024 15:36)  - S/p CABG x 3 (LIMA-LAD, SVG-OM, SVG-PDA) with Dr Figueroa  - Remains Intubated on vent  - NSR on CM    Plan:  S/p CABG x 3 (LIMA-LAD, SVG-OM, SVG-PDA) with Dr Figueroa  Intubated on vent   Plan as per CT Surgery

## 2024-10-03 DIAGNOSIS — I10 ESSENTIAL (PRIMARY) HYPERTENSION: ICD-10-CM

## 2024-10-03 LAB
ANION GAP SERPL CALC-SCNC: 10 MMOL/L — SIGNIFICANT CHANGE UP (ref 5–17)
ANISOCYTOSIS BLD QL: SLIGHT — SIGNIFICANT CHANGE UP
APTT BLD: 25.8 SEC — SIGNIFICANT CHANGE UP (ref 24.5–35.6)
BASOPHILS # BLD AUTO: 0 K/UL — SIGNIFICANT CHANGE UP (ref 0–0.2)
BASOPHILS NFR BLD AUTO: 0 % — SIGNIFICANT CHANGE UP (ref 0–2)
BUN SERPL-MCNC: 13.8 MG/DL — SIGNIFICANT CHANGE UP (ref 8–20)
BURR CELLS BLD QL SMEAR: PRESENT — SIGNIFICANT CHANGE UP
CALCIUM SERPL-MCNC: 8 MG/DL — LOW (ref 8.4–10.5)
CHLORIDE SERPL-SCNC: 110 MMOL/L — HIGH (ref 96–108)
CK MB CFR SERPL CALC: 21.7 NG/ML — HIGH (ref 0–6.7)
CK SERPL-CCNC: 371 U/L — HIGH (ref 30–200)
CO2 SERPL-SCNC: 21 MMOL/L — LOW (ref 22–29)
CREAT SERPL-MCNC: 0.8 MG/DL — SIGNIFICANT CHANGE UP (ref 0.5–1.3)
EGFR: 98 ML/MIN/1.73M2 — SIGNIFICANT CHANGE UP
ELLIPTOCYTES BLD QL SMEAR: SLIGHT — SIGNIFICANT CHANGE UP
EOSINOPHIL # BLD AUTO: 0 K/UL — SIGNIFICANT CHANGE UP (ref 0–0.5)
EOSINOPHIL NFR BLD AUTO: 0 % — SIGNIFICANT CHANGE UP (ref 0–6)
GIANT PLATELETS BLD QL SMEAR: PRESENT — SIGNIFICANT CHANGE UP
GLUCOSE BLDC GLUCOMTR-MCNC: 100 MG/DL — HIGH (ref 70–99)
GLUCOSE BLDC GLUCOMTR-MCNC: 105 MG/DL — HIGH (ref 70–99)
GLUCOSE BLDC GLUCOMTR-MCNC: 119 MG/DL — HIGH (ref 70–99)
GLUCOSE BLDC GLUCOMTR-MCNC: 123 MG/DL — HIGH (ref 70–99)
GLUCOSE BLDC GLUCOMTR-MCNC: 128 MG/DL — HIGH (ref 70–99)
GLUCOSE BLDC GLUCOMTR-MCNC: 160 MG/DL — HIGH (ref 70–99)
GLUCOSE BLDC GLUCOMTR-MCNC: 169 MG/DL — HIGH (ref 70–99)
GLUCOSE BLDC GLUCOMTR-MCNC: 171 MG/DL — HIGH (ref 70–99)
GLUCOSE BLDC GLUCOMTR-MCNC: 183 MG/DL — HIGH (ref 70–99)
GLUCOSE BLDC GLUCOMTR-MCNC: 189 MG/DL — HIGH (ref 70–99)
GLUCOSE BLDC GLUCOMTR-MCNC: 240 MG/DL — HIGH (ref 70–99)
GLUCOSE SERPL-MCNC: 120 MG/DL — HIGH (ref 70–99)
HCT VFR BLD CALC: 29.7 % — LOW (ref 39–50)
HGB BLD-MCNC: 10 G/DL — LOW (ref 13–17)
INR BLD: 1.04 RATIO — SIGNIFICANT CHANGE UP (ref 0.85–1.16)
LYMPHOCYTES # BLD AUTO: 0.52 K/UL — LOW (ref 1–3.3)
LYMPHOCYTES # BLD AUTO: 3.5 % — LOW (ref 13–44)
MAGNESIUM SERPL-MCNC: 2.5 MG/DL — SIGNIFICANT CHANGE UP (ref 1.8–2.6)
MANUAL SMEAR VERIFICATION: SIGNIFICANT CHANGE UP
MCHC RBC-ENTMCNC: 29.1 PG — SIGNIFICANT CHANGE UP (ref 27–34)
MCHC RBC-ENTMCNC: 33.7 GM/DL — SIGNIFICANT CHANGE UP (ref 32–36)
MCV RBC AUTO: 86.3 FL — SIGNIFICANT CHANGE UP (ref 80–100)
MONOCYTES # BLD AUTO: 0.91 K/UL — HIGH (ref 0–0.9)
MONOCYTES NFR BLD AUTO: 6.1 % — SIGNIFICANT CHANGE UP (ref 2–14)
NEUTROPHILS # BLD AUTO: 13.35 K/UL — HIGH (ref 1.8–7.4)
NEUTROPHILS NFR BLD AUTO: 85.1 % — HIGH (ref 43–77)
NEUTS BAND # BLD: 4.4 % — SIGNIFICANT CHANGE UP (ref 0–8)
OVALOCYTES BLD QL SMEAR: SLIGHT — SIGNIFICANT CHANGE UP
PLAT MORPH BLD: NORMAL — SIGNIFICANT CHANGE UP
PLATELET # BLD AUTO: 245 K/UL — SIGNIFICANT CHANGE UP (ref 150–400)
POIKILOCYTOSIS BLD QL AUTO: SLIGHT — SIGNIFICANT CHANGE UP
POLYCHROMASIA BLD QL SMEAR: SLIGHT — SIGNIFICANT CHANGE UP
POTASSIUM SERPL-MCNC: 4.2 MMOL/L — SIGNIFICANT CHANGE UP (ref 3.5–5.3)
POTASSIUM SERPL-SCNC: 4.2 MMOL/L — SIGNIFICANT CHANGE UP (ref 3.5–5.3)
PROTHROM AB SERPL-ACNC: 12.1 SEC — SIGNIFICANT CHANGE UP (ref 9.9–13.4)
RBC # BLD: 3.44 M/UL — LOW (ref 4.2–5.8)
RBC # FLD: 16.6 % — HIGH (ref 10.3–14.5)
RBC BLD AUTO: ABNORMAL
SODIUM SERPL-SCNC: 141 MMOL/L — SIGNIFICANT CHANGE UP (ref 135–145)
TROPONIN T, HIGH SENSITIVITY RESULT: 599 NG/L — HIGH (ref 0–51)
VARIANT LYMPHS # BLD: 0.9 % — SIGNIFICANT CHANGE UP (ref 0–6)
WBC # BLD: 14.92 K/UL — HIGH (ref 3.8–10.5)
WBC # FLD AUTO: 14.92 K/UL — HIGH (ref 3.8–10.5)

## 2024-10-03 PROCEDURE — 99291 CRITICAL CARE FIRST HOUR: CPT

## 2024-10-03 PROCEDURE — 99223 1ST HOSP IP/OBS HIGH 75: CPT

## 2024-10-03 PROCEDURE — 99292 CRITICAL CARE ADDL 30 MIN: CPT

## 2024-10-03 PROCEDURE — 71045 X-RAY EXAM CHEST 1 VIEW: CPT | Mod: 26

## 2024-10-03 PROCEDURE — 99024 POSTOP FOLLOW-UP VISIT: CPT

## 2024-10-03 PROCEDURE — 93010 ELECTROCARDIOGRAM REPORT: CPT

## 2024-10-03 RX ORDER — ALCOHOL ANTISEPTIC PADS
15 PADS, MEDICATED (EA) TOPICAL ONCE
Refills: 0 | Status: DISCONTINUED | OUTPATIENT
Start: 2024-10-03 | End: 2024-10-03

## 2024-10-03 RX ORDER — METOPROLOL TARTRATE 50 MG
12.5 TABLET ORAL
Refills: 0 | Status: DISCONTINUED | OUTPATIENT
Start: 2024-10-03 | End: 2024-10-05

## 2024-10-03 RX ORDER — INSULIN LISPRO 100/ML
VIAL (ML) SUBCUTANEOUS AT BEDTIME
Refills: 0 | Status: DISCONTINUED | OUTPATIENT
Start: 2024-10-03 | End: 2024-10-07

## 2024-10-03 RX ORDER — HYDROMORPHONE HYDROCHLORIDE 1 MG/ML
0.5 INJECTION, SOLUTION INTRAMUSCULAR; INTRAVENOUS; SUBCUTANEOUS ONCE
Refills: 0 | Status: DISCONTINUED | OUTPATIENT
Start: 2024-10-03 | End: 2024-10-03

## 2024-10-03 RX ORDER — INSULIN LISPRO 100/ML
VIAL (ML) SUBCUTANEOUS
Refills: 0 | Status: DISCONTINUED | OUTPATIENT
Start: 2024-10-03 | End: 2024-10-07

## 2024-10-03 RX ORDER — TAMSULOSIN HCL 0.4 MG
0.4 CAPSULE ORAL
Refills: 0 | Status: DISCONTINUED | OUTPATIENT
Start: 2024-10-03 | End: 2024-10-07

## 2024-10-03 RX ORDER — KETOROLAC TROMETHAMINE 10 MG/1
15 TABLET, FILM COATED ORAL EVERY 8 HOURS
Refills: 0 | Status: DISCONTINUED | OUTPATIENT
Start: 2024-10-03 | End: 2024-10-04

## 2024-10-03 RX ORDER — SODIUM CHLORIDE IRRIG SOLUTION 0.9 %
500 SOLUTION, IRRIGATION IRRIGATION ONCE
Refills: 0 | Status: COMPLETED | OUTPATIENT
Start: 2024-10-03 | End: 2024-10-03

## 2024-10-03 RX ORDER — ACETAMINOPHEN 325 MG
1000 TABLET ORAL ONCE
Refills: 0 | Status: COMPLETED | OUTPATIENT
Start: 2024-10-03 | End: 2024-10-03

## 2024-10-03 RX ORDER — INSULIN LISPRO 100/ML
4 VIAL (ML) SUBCUTANEOUS
Refills: 0 | Status: DISCONTINUED | OUTPATIENT
Start: 2024-10-03 | End: 2024-10-07

## 2024-10-03 RX ORDER — EZETIMIBE 10 MG/1
10 TABLET ORAL DAILY
Refills: 0 | Status: DISCONTINUED | OUTPATIENT
Start: 2024-10-03 | End: 2024-10-07

## 2024-10-03 RX ORDER — DULOXETINE HCL 20 MG
30 CAPSULE,DELAYED RELEASE (ENTERIC COATED) ORAL DAILY
Refills: 0 | Status: DISCONTINUED | OUTPATIENT
Start: 2024-10-03 | End: 2024-10-07

## 2024-10-03 RX ORDER — SODIUM CHLORIDE IRRIG SOLUTION 0.9 %
1000 SOLUTION, IRRIGATION IRRIGATION
Refills: 0 | Status: DISCONTINUED | OUTPATIENT
Start: 2024-10-03 | End: 2024-10-05

## 2024-10-03 RX ORDER — GLUCAGON INJECTION, SOLUTION 0.5 MG/.1ML
1 INJECTION, SOLUTION SUBCUTANEOUS ONCE
Refills: 0 | Status: DISCONTINUED | OUTPATIENT
Start: 2024-10-03 | End: 2024-10-07

## 2024-10-03 RX ADMIN — KETOROLAC TROMETHAMINE 15 MILLIGRAM(S): 10 TABLET, FILM COATED ORAL at 16:00

## 2024-10-03 RX ADMIN — Medication 100 MILLIGRAM(S): at 06:15

## 2024-10-03 RX ADMIN — Medication 975 MILLIGRAM(S): at 17:06

## 2024-10-03 RX ADMIN — Medication 2 UNIT(S)/HR: at 06:16

## 2024-10-03 RX ADMIN — OXYCODONE HYDROCHLORIDE 5 MILLIGRAM(S): 30 TABLET, FILM COATED, EXTENDED RELEASE ORAL at 20:02

## 2024-10-03 RX ADMIN — PANTOPRAZOLE SODIUM 40 MILLIGRAM(S): 40 TABLET, DELAYED RELEASE ORAL at 12:41

## 2024-10-03 RX ADMIN — Medication 975 MILLIGRAM(S): at 13:40

## 2024-10-03 RX ADMIN — GABAPENTIN 100 MILLIGRAM(S): 800 TABLET, FILM COATED ORAL at 06:15

## 2024-10-03 RX ADMIN — Medication 500 MILLIGRAM(S): at 15:59

## 2024-10-03 RX ADMIN — Medication 1: at 21:30

## 2024-10-03 RX ADMIN — HYDROMORPHONE HYDROCHLORIDE 0.5 MILLIGRAM(S): 1 INJECTION, SOLUTION INTRAMUSCULAR; INTRAVENOUS; SUBCUTANEOUS at 09:34

## 2024-10-03 RX ADMIN — Medication 975 MILLIGRAM(S): at 12:40

## 2024-10-03 RX ADMIN — ATORVASTATIN CALCIUM 80 MILLIGRAM(S): 10 TABLET, FILM COATED ORAL at 01:19

## 2024-10-03 RX ADMIN — GABAPENTIN 100 MILLIGRAM(S): 800 TABLET, FILM COATED ORAL at 22:55

## 2024-10-03 RX ADMIN — OXYCODONE HYDROCHLORIDE 10 MILLIGRAM(S): 30 TABLET, FILM COATED, EXTENDED RELEASE ORAL at 13:41

## 2024-10-03 RX ADMIN — Medication 500 MILLIGRAM(S): at 21:31

## 2024-10-03 RX ADMIN — ATORVASTATIN CALCIUM 80 MILLIGRAM(S): 10 TABLET, FILM COATED ORAL at 21:31

## 2024-10-03 RX ADMIN — CHLORHEXIDINE GLUCONATE ORAL RINSE 1 APPLICATION(S): 1.2 SOLUTION DENTAL at 06:15

## 2024-10-03 RX ADMIN — KETOROLAC TROMETHAMINE 15 MILLIGRAM(S): 10 TABLET, FILM COATED ORAL at 22:30

## 2024-10-03 RX ADMIN — Medication 500 MILLIGRAM(S): at 06:16

## 2024-10-03 RX ADMIN — Medication 750 MILLILITER(S): at 09:35

## 2024-10-03 RX ADMIN — Medication 975 MILLIGRAM(S): at 18:06

## 2024-10-03 RX ADMIN — OXYCODONE HYDROCHLORIDE 5 MILLIGRAM(S): 30 TABLET, FILM COATED, EXTENDED RELEASE ORAL at 09:01

## 2024-10-03 RX ADMIN — OXYCODONE HYDROCHLORIDE 5 MILLIGRAM(S): 30 TABLET, FILM COATED, EXTENDED RELEASE ORAL at 21:23

## 2024-10-03 RX ADMIN — Medication 2 TABLET(S): at 21:31

## 2024-10-03 RX ADMIN — Medication 400 MILLIGRAM(S): at 04:02

## 2024-10-03 RX ADMIN — GABAPENTIN 100 MILLIGRAM(S): 800 TABLET, FILM COATED ORAL at 15:59

## 2024-10-03 RX ADMIN — Medication 75 MILLIGRAM(S): at 12:40

## 2024-10-03 RX ADMIN — AMIODARONE HYDROCHLORIDE 400 MILLIGRAM(S): 50 INJECTION, SOLUTION INTRAVENOUS at 17:06

## 2024-10-03 RX ADMIN — OXYCODONE HYDROCHLORIDE 10 MILLIGRAM(S): 30 TABLET, FILM COATED, EXTENDED RELEASE ORAL at 12:41

## 2024-10-03 RX ADMIN — VANCOMYCIN HCL-SODIUM CHLORIDE IV SOLN 1.5 GM/250ML-0.9% 250 MILLIGRAM(S): 1.5-0.9/25 SOLUTION at 12:39

## 2024-10-03 RX ADMIN — Medication 81 MILLIGRAM(S): at 12:40

## 2024-10-03 RX ADMIN — Medication 1000 MILLIGRAM(S): at 05:02

## 2024-10-03 RX ADMIN — Medication 30 MILLIGRAM(S): at 12:40

## 2024-10-03 RX ADMIN — KETOROLAC TROMETHAMINE 15 MILLIGRAM(S): 10 TABLET, FILM COATED ORAL at 21:31

## 2024-10-03 RX ADMIN — Medication 2000 MILLILITER(S): at 11:33

## 2024-10-03 RX ADMIN — Medication 100 MILLIGRAM(S): at 12:38

## 2024-10-03 RX ADMIN — Medication 17 GRAM(S): at 12:39

## 2024-10-03 RX ADMIN — EZETIMIBE 10 MILLIGRAM(S): 10 TABLET ORAL at 12:39

## 2024-10-03 RX ADMIN — AMIODARONE HYDROCHLORIDE 400 MILLIGRAM(S): 50 INJECTION, SOLUTION INTRAVENOUS at 01:20

## 2024-10-03 RX ADMIN — VANCOMYCIN HCL-SODIUM CHLORIDE IV SOLN 1.5 GM/250ML-0.9% 250 MILLIGRAM(S): 1.5-0.9/25 SOLUTION at 01:20

## 2024-10-03 RX ADMIN — Medication 100 MILLIGRAM(S): at 20:01

## 2024-10-03 RX ADMIN — HYDROMORPHONE HYDROCHLORIDE 0.5 MILLIGRAM(S): 1 INJECTION, SOLUTION INTRAMUSCULAR; INTRAVENOUS; SUBCUTANEOUS at 10:04

## 2024-10-03 RX ADMIN — Medication 500 MILLIGRAM(S): at 17:06

## 2024-10-03 RX ADMIN — AMIODARONE HYDROCHLORIDE 400 MILLIGRAM(S): 50 INJECTION, SOLUTION INTRAVENOUS at 06:15

## 2024-10-03 RX ADMIN — Medication 4 UNIT(S): at 17:08

## 2024-10-03 RX ADMIN — OXYCODONE HYDROCHLORIDE 5 MILLIGRAM(S): 30 TABLET, FILM COATED, EXTENDED RELEASE ORAL at 10:01

## 2024-10-03 RX ADMIN — CHLORHEXIDINE GLUCONATE ORAL RINSE 1 APPLICATION(S): 1.2 SOLUTION DENTAL at 17:07

## 2024-10-03 RX ADMIN — Medication 0.4 MILLIGRAM(S): at 17:07

## 2024-10-03 RX ADMIN — Medication 975 MILLIGRAM(S): at 23:00

## 2024-10-03 RX ADMIN — PANTOPRAZOLE SODIUM 40 MILLIGRAM(S): 40 TABLET, DELAYED RELEASE ORAL at 01:20

## 2024-10-03 RX ADMIN — KETOROLAC TROMETHAMINE 15 MILLIGRAM(S): 10 TABLET, FILM COATED ORAL at 16:30

## 2024-10-03 RX ADMIN — Medication 4 UNIT(S): at 12:41

## 2024-10-03 NOTE — AIRWAY REMOVAL NOTE  ADULT & PEDS - NS REMOVAL  OF ARTIFICAL AIRWAY STRIDOR
CTICU ADEEL Stuart made aware of stridor present post-extubation. PA denies any stridor at this time./no

## 2024-10-03 NOTE — CONSULT NOTE ADULT - SUBJECTIVE AND OBJECTIVE BOX
Patient is a 64y old  Male who presents with a chief complaint of CAD (02 Oct 2024 17:40)    HPI:  64M w/ T2DM cb neuropathy/CVA with left sided weakness/ carotid disease/ CAD s/p stents, HTN and HLD presented as outpatient with [facility], found on outpatient cath with MVD and admitted to [facility] for CABG. 10/1 s/p 3v CABG  Consult for diabetes mgmt a1c 7.2      home meds:         PAST MEDICAL & SURGICAL HISTORY:  Hypertension    History of hyperlipidemia    CAD S/P percutaneous coronary angioplasty  7/10 5 stents and 7/14 one stent    Neuropathy    Type 2 diabetes mellitus    BPH (benign prostatic hyperplasia)    CAD (coronary artery disease)  6 stents in the past 5 2010 and 1 in 2014    H/O laminectomy  2015        Social History:  see above    FAMILY HISTORY:  Family history of type 2 diabetes mellitus (Mother)    FH: stroke (Father)          Allergies    shellfish (Rash)  Originally Entered as [Unknown] reaction to [IES] (Unknown)  No Known Drug Allergies  Originally Entered as [Unknown] reaction to [SEASONAL ALLERG] (Unknown)  shellfish (Unknown)    Intolerances        ROS as noted in the HPI    MEDICATIONS  (STANDING):  acetaminophen     Tablet .. 975 milliGRAM(s) Oral every 6 hours  aMIOdarone    Tablet 400 milliGRAM(s) Oral two times a day  ascorbic acid 500 milliGRAM(s) Oral two times a day  aspirin enteric coated 81 milliGRAM(s) Oral daily  atorvastatin 80 milliGRAM(s) Oral at bedtime  cefuroxime  IVPB 1500 milliGRAM(s) IV Intermittent every 8 hours  chlorhexidine 2% Cloths 1 Application(s) Topical two times a day  clopidogrel Tablet 75 milliGRAM(s) Oral daily  dextrose 5%. 1000 milliLiter(s) (100 mL/Hr) IV Continuous <Continuous>  dextrose 5%. 1000 milliLiter(s) (50 mL/Hr) IV Continuous <Continuous>  dextrose 50% Injectable 50 milliLiter(s) IV Push every 15 minutes  DULoxetine 30 milliGRAM(s) Oral daily  ezetimibe 10 milliGRAM(s) Oral daily  gabapentin 100 milliGRAM(s) Oral every 8 hours  glucagon  Injectable 1 milliGRAM(s) IntraMuscular once  insulin lispro Injectable (ADMELOG) 4 Unit(s) SubCutaneous three times a day before meals  insulin regular Infusion 2 Unit(s)/Hr (2 mL/Hr) IV Continuous <Continuous>  ketorolac   Injectable 15 milliGRAM(s) IV Push every 8 hours  lactated ringers Bolus 500 milliLiter(s) IV Bolus once  methocarbamol 500 milliGRAM(s) Oral every 8 hours  metoprolol tartrate 12.5 milliGRAM(s) Oral two times a day  pantoprazole    Tablet 40 milliGRAM(s) Oral daily  polyethylene glycol 3350 17 Gram(s) Oral daily  senna 2 Tablet(s) Oral at bedtime  sodium chloride 0.9%. 1000 milliLiter(s) (10 mL/Hr) IV Continuous <Continuous>  sodium chloride 0.9%. 1000 milliLiter(s) (5 mL/Hr) IV Continuous <Continuous>  tamsulosin 0.4 milliGRAM(s) Oral two times a day  vancomycin  IVPB 1000 milliGRAM(s) IV Intermittent every 12 hours    MEDICATIONS  (PRN):  ondansetron Injectable 4 milliGRAM(s) IV Push every 6 hours PRN Nausea and/or Vomiting  oxyCODONE    IR 5 milliGRAM(s) Oral every 4 hours PRN Moderate Pain (4 - 6)  oxyCODONE    IR 10 milliGRAM(s) Oral every 4 hours PRN Severe Pain (7 - 10)      Vital Signs Last 24 Hrs  T(C): 37.3 (03 Oct 2024 10:00), Max: 37.6 (02 Oct 2024 22:15)  T(F): 99.1 (03 Oct 2024 10:00), Max: 99.7 (02 Oct 2024 22:15)  HR: 77 (03 Oct 2024 10:00) (69 - 88)  BP: 112/63 (03 Oct 2024 10:00) (95/60 - 115/64)  BP(mean): 77 (03 Oct 2024 10:00) (71 - 79)  RR: 18 (03 Oct 2024 10:00) (0 - 25)  SpO2: 100% (03 Oct 2024 10:00) (100% - 100%)    Parameters below as of 03 Oct 2024 08:00  Patient On (Oxygen Delivery Method): nasal cannula  O2 Flow (L/min): 2    Height (cm): 177.8 (10-03-24 @ 03:04)  Weight (kg): 77.6 (10-03-24 @ 03:04)  BMI (kg/m2): 24.5 (10-03-24 @ 03:04)  BSA (m2): 1.95 (10-03-24 @ 03:04)    Physical Exam:    Constitutional: NAD, well-developed  Respiratory: CTAB, normal respirations, bandaged anterior chest  Cardiovascular: S1 and S2, RRR  Gastrointestinal: BS+, soft, ntnd  Extremities: +peripheral edema  Neurological: AOx3, no focal deficits  Psychiatric: Normal mood and normal affect  Skin: no rashes, no acanthosis    LABS  10-03    141  |  110[H]  |  13.8  ----------------------------<  120[H]  4.2   |  21.0[L]  |  0.80    Ca    8.0[L]      03 Oct 2024 02:15  Mg     2.5     10-03    TPro  4.7[L]  /  Alb  3.0[L]  /  TBili  0.6  /  DBili  x   /  AST  33  /  ALT  18  /  AlkPhos  38[L]  10-02                          10.0   14.92 )-----------( 245      ( 03 Oct 2024 02:10 )             29.7       A1C with Estimated Average Glucose Result: 7.2 % (09-20-24 @ 15:55)  A1C with Estimated Average Glucose Result: 7.0 % (09-10-24 @ 20:10)        Ketone - Urine: Negative mg/dL (10-02 @ 13:42)    Aspartate Aminotransferase (AST/SGOT): 33 U/L (10-02-24 @ 17:40)  Alanine Aminotransferase (ALT/SGPT): 18 U/L (10-02-24 @ 17:40)  Alkaline Phosphatase: 38 U/L (10-02-24 @ 17:40)  Albumin: 3.0 g/dL (10-02-24 @ 17:40)          CAPILLARY BLOOD GLUCOSE      POCT Blood Glucose.: 119 mg/dL (03 Oct 2024 12:34)  POCT Blood Glucose.: 240 mg/dL (03 Oct 2024 09:14)  POCT Blood Glucose.: 160 mg/dL (03 Oct 2024 06:58)  POCT Blood Glucose.: 183 mg/dL (03 Oct 2024 06:10)  POCT Blood Glucose.: 100 mg/dL (03 Oct 2024 04:06)  POCT Blood Glucose.: 105 mg/dL (03 Oct 2024 03:17)  POCT Blood Glucose.: 123 mg/dL (03 Oct 2024 02:01)  POCT Blood Glucose.: 128 mg/dL (03 Oct 2024 01:10)  POCT Blood Glucose.: 180 mg/dL (02 Oct 2024 23:01)  POCT Blood Glucose.: 193 mg/dL (02 Oct 2024 21:57)  POCT Blood Glucose.: 205 mg/dL (02 Oct 2024 21:03)  POCT Blood Glucose.: 224 mg/dL (02 Oct 2024 20:16)  POCT Blood Glucose.: 241 mg/dL (02 Oct 2024 18:51)      Imaging     Patient is a 64y old  Male who presents with a chief complaint of CAD (02 Oct 2024 17:40)    HPI:  64M w/ T2DM cb neuropathy/CVA with left sided weakness/ carotid disease/ CAD s/p stents, HTN and HLD presented as outpatient with [facility], found on outpatient cath with MVD and admitted to [facility] for CABG. 10/1 s/p 3v CABG  Consult for diabetes mgmt a1c 7.2    diabetes for 10 years  fhx of diabetes in mother  lives at home with daughter  no smoking/etoh  retired from [employer]    home meds: metfomrin 500mg at 2 tabs daily  used to be on januvia but reported hypoglycemia    off insulin gtt    PAST MEDICAL & SURGICAL HISTORY:  Hypertension    History of hyperlipidemia    CAD S/P percutaneous coronary angioplasty  7/10 5 stents and 7/14 one stent    Neuropathy    Type 2 diabetes mellitus    BPH (benign prostatic hyperplasia)    CAD (coronary artery disease)  6 stents in the past 5 2010 and 1 in 2014    H/O laminectomy  2015        Social History:  see above    FAMILY HISTORY:  Family history of type 2 diabetes mellitus (Mother)    FH: stroke (Father)          Allergies    shellfish (Rash)  Originally Entered as [Unknown] reaction to [IES] (Unknown)  No Known Drug Allergies  Originally Entered as [Unknown] reaction to [SEASONAL ALLERG] (Unknown)  shellfish (Unknown)    Intolerances        ROS as noted in the HPI    MEDICATIONS  (STANDING):  acetaminophen     Tablet .. 975 milliGRAM(s) Oral every 6 hours  aMIOdarone    Tablet 400 milliGRAM(s) Oral two times a day  ascorbic acid 500 milliGRAM(s) Oral two times a day  aspirin enteric coated 81 milliGRAM(s) Oral daily  atorvastatin 80 milliGRAM(s) Oral at bedtime  cefuroxime  IVPB 1500 milliGRAM(s) IV Intermittent every 8 hours  chlorhexidine 2% Cloths 1 Application(s) Topical two times a day  clopidogrel Tablet 75 milliGRAM(s) Oral daily  dextrose 5%. 1000 milliLiter(s) (100 mL/Hr) IV Continuous <Continuous>  dextrose 5%. 1000 milliLiter(s) (50 mL/Hr) IV Continuous <Continuous>  dextrose 50% Injectable 50 milliLiter(s) IV Push every 15 minutes  DULoxetine 30 milliGRAM(s) Oral daily  ezetimibe 10 milliGRAM(s) Oral daily  gabapentin 100 milliGRAM(s) Oral every 8 hours  glucagon  Injectable 1 milliGRAM(s) IntraMuscular once  insulin lispro Injectable (ADMELOG) 4 Unit(s) SubCutaneous three times a day before meals  insulin regular Infusion 2 Unit(s)/Hr (2 mL/Hr) IV Continuous <Continuous>  ketorolac   Injectable 15 milliGRAM(s) IV Push every 8 hours  lactated ringers Bolus 500 milliLiter(s) IV Bolus once  methocarbamol 500 milliGRAM(s) Oral every 8 hours  metoprolol tartrate 12.5 milliGRAM(s) Oral two times a day  pantoprazole    Tablet 40 milliGRAM(s) Oral daily  polyethylene glycol 3350 17 Gram(s) Oral daily  senna 2 Tablet(s) Oral at bedtime  sodium chloride 0.9%. 1000 milliLiter(s) (10 mL/Hr) IV Continuous <Continuous>  sodium chloride 0.9%. 1000 milliLiter(s) (5 mL/Hr) IV Continuous <Continuous>  tamsulosin 0.4 milliGRAM(s) Oral two times a day  vancomycin  IVPB 1000 milliGRAM(s) IV Intermittent every 12 hours    MEDICATIONS  (PRN):  ondansetron Injectable 4 milliGRAM(s) IV Push every 6 hours PRN Nausea and/or Vomiting  oxyCODONE    IR 5 milliGRAM(s) Oral every 4 hours PRN Moderate Pain (4 - 6)  oxyCODONE    IR 10 milliGRAM(s) Oral every 4 hours PRN Severe Pain (7 - 10)      Vital Signs Last 24 Hrs  T(C): 37.3 (03 Oct 2024 10:00), Max: 37.6 (02 Oct 2024 22:15)  T(F): 99.1 (03 Oct 2024 10:00), Max: 99.7 (02 Oct 2024 22:15)  HR: 77 (03 Oct 2024 10:00) (69 - 88)  BP: 112/63 (03 Oct 2024 10:00) (95/60 - 115/64)  BP(mean): 77 (03 Oct 2024 10:00) (71 - 79)  RR: 18 (03 Oct 2024 10:00) (0 - 25)  SpO2: 100% (03 Oct 2024 10:00) (100% - 100%)    Parameters below as of 03 Oct 2024 08:00  Patient On (Oxygen Delivery Method): nasal cannula  O2 Flow (L/min): 2    Height (cm): 177.8 (10-03-24 @ 03:04)  Weight (kg): 77.6 (10-03-24 @ 03:04)  BMI (kg/m2): 24.5 (10-03-24 @ 03:04)  BSA (m2): 1.95 (10-03-24 @ 03:04)    Physical Exam:    Constitutional: NAD, well-developed  Respiratory: CTAB, normal respirations, bandaged anterior chest  Cardiovascular: S1 and S2, RRR  Gastrointestinal: BS+, soft, ntnd  Extremities: +peripheral edema  Neurological: AOx3, no focal deficits  Psychiatric: Normal mood and normal affect  Skin: no rashes, no acanthosis    LABS  10-03    141  |  110[H]  |  13.8  ----------------------------<  120[H]  4.2   |  21.0[L]  |  0.80    Ca    8.0[L]      03 Oct 2024 02:15  Mg     2.5     10-03    TPro  4.7[L]  /  Alb  3.0[L]  /  TBili  0.6  /  DBili  x   /  AST  33  /  ALT  18  /  AlkPhos  38[L]  10-02                          10.0   14.92 )-----------( 245      ( 03 Oct 2024 02:10 )             29.7       A1C with Estimated Average Glucose Result: 7.2 % (09-20-24 @ 15:55)  A1C with Estimated Average Glucose Result: 7.0 % (09-10-24 @ 20:10)        Ketone - Urine: Negative mg/dL (10-02 @ 13:42)    Aspartate Aminotransferase (AST/SGOT): 33 U/L (10-02-24 @ 17:40)  Alanine Aminotransferase (ALT/SGPT): 18 U/L (10-02-24 @ 17:40)  Alkaline Phosphatase: 38 U/L (10-02-24 @ 17:40)  Albumin: 3.0 g/dL (10-02-24 @ 17:40)          CAPILLARY BLOOD GLUCOSE      POCT Blood Glucose.: 119 mg/dL (03 Oct 2024 12:34)  POCT Blood Glucose.: 240 mg/dL (03 Oct 2024 09:14)  POCT Blood Glucose.: 160 mg/dL (03 Oct 2024 06:58)  POCT Blood Glucose.: 183 mg/dL (03 Oct 2024 06:10)  POCT Blood Glucose.: 100 mg/dL (03 Oct 2024 04:06)  POCT Blood Glucose.: 105 mg/dL (03 Oct 2024 03:17)  POCT Blood Glucose.: 123 mg/dL (03 Oct 2024 02:01)  POCT Blood Glucose.: 128 mg/dL (03 Oct 2024 01:10)  POCT Blood Glucose.: 180 mg/dL (02 Oct 2024 23:01)  POCT Blood Glucose.: 193 mg/dL (02 Oct 2024 21:57)  POCT Blood Glucose.: 205 mg/dL (02 Oct 2024 21:03)  POCT Blood Glucose.: 224 mg/dL (02 Oct 2024 20:16)  POCT Blood Glucose.: 241 mg/dL (02 Oct 2024 18:51)      Imaging

## 2024-10-04 LAB
ANION GAP SERPL CALC-SCNC: 10 MMOL/L — SIGNIFICANT CHANGE UP (ref 5–17)
BLD GP AB SCN SERPL QL: SIGNIFICANT CHANGE UP
BUN SERPL-MCNC: 20.3 MG/DL — HIGH (ref 8–20)
CALCIUM SERPL-MCNC: 7.7 MG/DL — LOW (ref 8.4–10.5)
CHLORIDE SERPL-SCNC: 104 MMOL/L — SIGNIFICANT CHANGE UP (ref 96–108)
CO2 SERPL-SCNC: 22 MMOL/L — SIGNIFICANT CHANGE UP (ref 22–29)
CREAT SERPL-MCNC: 0.94 MG/DL — SIGNIFICANT CHANGE UP (ref 0.5–1.3)
CULTURE RESULTS: ABNORMAL
EGFR: 89 ML/MIN/1.73M2 — SIGNIFICANT CHANGE UP
GLUCOSE BLDC GLUCOMTR-MCNC: 167 MG/DL — HIGH (ref 70–99)
GLUCOSE BLDC GLUCOMTR-MCNC: 180 MG/DL — HIGH (ref 70–99)
GLUCOSE BLDC GLUCOMTR-MCNC: 191 MG/DL — HIGH (ref 70–99)
GLUCOSE BLDC GLUCOMTR-MCNC: 207 MG/DL — HIGH (ref 70–99)
GLUCOSE SERPL-MCNC: 168 MG/DL — HIGH (ref 70–99)
HCT VFR BLD CALC: 21.9 % — LOW (ref 39–50)
HCT VFR BLD CALC: 26.6 % — LOW (ref 39–50)
HGB BLD-MCNC: 7.3 G/DL — LOW (ref 13–17)
HGB BLD-MCNC: 9 G/DL — LOW (ref 13–17)
MAGNESIUM SERPL-MCNC: 2.1 MG/DL — SIGNIFICANT CHANGE UP (ref 1.6–2.6)
MCHC RBC-ENTMCNC: 29.2 PG — SIGNIFICANT CHANGE UP (ref 27–34)
MCHC RBC-ENTMCNC: 29.4 PG — SIGNIFICANT CHANGE UP (ref 27–34)
MCHC RBC-ENTMCNC: 33.3 GM/DL — SIGNIFICANT CHANGE UP (ref 32–36)
MCHC RBC-ENTMCNC: 33.8 GM/DL — SIGNIFICANT CHANGE UP (ref 32–36)
MCV RBC AUTO: 86.9 FL — SIGNIFICANT CHANGE UP (ref 80–100)
MCV RBC AUTO: 87.6 FL — SIGNIFICANT CHANGE UP (ref 80–100)
PLATELET # BLD AUTO: 132 K/UL — LOW (ref 150–400)
PLATELET # BLD AUTO: 135 K/UL — LOW (ref 150–400)
POTASSIUM SERPL-MCNC: 4.1 MMOL/L — SIGNIFICANT CHANGE UP (ref 3.5–5.3)
POTASSIUM SERPL-SCNC: 4.1 MMOL/L — SIGNIFICANT CHANGE UP (ref 3.5–5.3)
RBC # BLD: 2.5 M/UL — LOW (ref 4.2–5.8)
RBC # BLD: 3.06 M/UL — LOW (ref 4.2–5.8)
RBC # FLD: 16.9 % — HIGH (ref 10.3–14.5)
RBC # FLD: 17.1 % — HIGH (ref 10.3–14.5)
SODIUM SERPL-SCNC: 136 MMOL/L — SIGNIFICANT CHANGE UP (ref 135–145)
SPECIMEN SOURCE: SIGNIFICANT CHANGE UP
WBC # BLD: 10.82 K/UL — HIGH (ref 3.8–10.5)
WBC # BLD: 12.55 K/UL — HIGH (ref 3.8–10.5)
WBC # FLD AUTO: 10.82 K/UL — HIGH (ref 3.8–10.5)
WBC # FLD AUTO: 12.55 K/UL — HIGH (ref 3.8–10.5)

## 2024-10-04 PROCEDURE — 99232 SBSQ HOSP IP/OBS MODERATE 35: CPT

## 2024-10-04 PROCEDURE — 71045 X-RAY EXAM CHEST 1 VIEW: CPT | Mod: 26

## 2024-10-04 PROCEDURE — 93010 ELECTROCARDIOGRAM REPORT: CPT

## 2024-10-04 PROCEDURE — 99292 CRITICAL CARE ADDL 30 MIN: CPT

## 2024-10-04 PROCEDURE — 99291 CRITICAL CARE FIRST HOUR: CPT

## 2024-10-04 PROCEDURE — 99024 POSTOP FOLLOW-UP VISIT: CPT

## 2024-10-04 RX ORDER — CHLORHEXIDINE GLUCONATE ORAL RINSE 1.2 MG/ML
1 SOLUTION DENTAL
Refills: 0 | Status: DISCONTINUED | OUTPATIENT
Start: 2024-10-04 | End: 2024-10-07

## 2024-10-04 RX ORDER — SODIUM CHLORIDE 0.9 % (FLUSH) 0.9 %
10 SYRINGE (ML) INJECTION
Refills: 0 | Status: DISCONTINUED | OUTPATIENT
Start: 2024-10-04 | End: 2024-10-07

## 2024-10-04 RX ORDER — FUROSEMIDE 10 MG/ML
20 INJECTION INTRAVENOUS ONCE
Refills: 0 | Status: COMPLETED | OUTPATIENT
Start: 2024-10-04 | End: 2024-10-04

## 2024-10-04 RX ORDER — ENOXAPARIN SODIUM 150 MG/ML
40 INJECTION SUBCUTANEOUS EVERY 24 HOURS
Refills: 0 | Status: DISCONTINUED | OUTPATIENT
Start: 2024-10-04 | End: 2024-10-07

## 2024-10-04 RX ADMIN — Medication 4 UNIT(S): at 16:45

## 2024-10-04 RX ADMIN — ATORVASTATIN CALCIUM 80 MILLIGRAM(S): 10 TABLET, FILM COATED ORAL at 21:34

## 2024-10-04 RX ADMIN — Medication 75 MILLIGRAM(S): at 11:11

## 2024-10-04 RX ADMIN — VANCOMYCIN HCL-SODIUM CHLORIDE IV SOLN 1.5 GM/250ML-0.9% 250 MILLIGRAM(S): 1.5-0.9/25 SOLUTION at 00:29

## 2024-10-04 RX ADMIN — Medication 125 MILLILITER(S): at 11:15

## 2024-10-04 RX ADMIN — OXYCODONE HYDROCHLORIDE 5 MILLIGRAM(S): 30 TABLET, FILM COATED, EXTENDED RELEASE ORAL at 19:25

## 2024-10-04 RX ADMIN — ENOXAPARIN SODIUM 40 MILLIGRAM(S): 150 INJECTION SUBCUTANEOUS at 11:10

## 2024-10-04 RX ADMIN — OXYCODONE HYDROCHLORIDE 10 MILLIGRAM(S): 30 TABLET, FILM COATED, EXTENDED RELEASE ORAL at 15:43

## 2024-10-04 RX ADMIN — AMIODARONE HYDROCHLORIDE 400 MILLIGRAM(S): 50 INJECTION, SOLUTION INTRAVENOUS at 17:23

## 2024-10-04 RX ADMIN — Medication 125 MILLILITER(S): at 00:26

## 2024-10-04 RX ADMIN — Medication 4: at 11:18

## 2024-10-04 RX ADMIN — Medication 975 MILLIGRAM(S): at 07:30

## 2024-10-04 RX ADMIN — Medication 81 MILLIGRAM(S): at 11:11

## 2024-10-04 RX ADMIN — AMIODARONE HYDROCHLORIDE 400 MILLIGRAM(S): 50 INJECTION, SOLUTION INTRAVENOUS at 06:31

## 2024-10-04 RX ADMIN — Medication 0.4 MILLIGRAM(S): at 06:31

## 2024-10-04 RX ADMIN — Medication 0.4 MILLIGRAM(S): at 17:23

## 2024-10-04 RX ADMIN — CHLORHEXIDINE GLUCONATE ORAL RINSE 1 APPLICATION(S): 1.2 SOLUTION DENTAL at 06:37

## 2024-10-04 RX ADMIN — Medication 500 MILLIGRAM(S): at 17:24

## 2024-10-04 RX ADMIN — Medication 975 MILLIGRAM(S): at 23:00

## 2024-10-04 RX ADMIN — CHLORHEXIDINE GLUCONATE ORAL RINSE 1 APPLICATION(S): 1.2 SOLUTION DENTAL at 19:25

## 2024-10-04 RX ADMIN — Medication 2: at 07:58

## 2024-10-04 RX ADMIN — Medication 975 MILLIGRAM(S): at 00:03

## 2024-10-04 RX ADMIN — Medication 1: at 21:34

## 2024-10-04 RX ADMIN — Medication 975 MILLIGRAM(S): at 06:31

## 2024-10-04 RX ADMIN — GABAPENTIN 100 MILLIGRAM(S): 800 TABLET, FILM COATED ORAL at 14:33

## 2024-10-04 RX ADMIN — Medication 100 MILLIGRAM(S): at 05:50

## 2024-10-04 RX ADMIN — Medication 975 MILLIGRAM(S): at 18:00

## 2024-10-04 RX ADMIN — OXYCODONE HYDROCHLORIDE 5 MILLIGRAM(S): 30 TABLET, FILM COATED, EXTENDED RELEASE ORAL at 11:10

## 2024-10-04 RX ADMIN — FUROSEMIDE 20 MILLIGRAM(S): 10 INJECTION INTRAVENOUS at 12:39

## 2024-10-04 RX ADMIN — OXYCODONE HYDROCHLORIDE 5 MILLIGRAM(S): 30 TABLET, FILM COATED, EXTENDED RELEASE ORAL at 20:00

## 2024-10-04 RX ADMIN — Medication 500 MILLIGRAM(S): at 21:35

## 2024-10-04 RX ADMIN — Medication 975 MILLIGRAM(S): at 23:31

## 2024-10-04 RX ADMIN — PANTOPRAZOLE SODIUM 40 MILLIGRAM(S): 40 TABLET, DELAYED RELEASE ORAL at 11:11

## 2024-10-04 RX ADMIN — OXYCODONE HYDROCHLORIDE 10 MILLIGRAM(S): 30 TABLET, FILM COATED, EXTENDED RELEASE ORAL at 16:00

## 2024-10-04 RX ADMIN — GABAPENTIN 100 MILLIGRAM(S): 800 TABLET, FILM COATED ORAL at 06:32

## 2024-10-04 RX ADMIN — KETOROLAC TROMETHAMINE 15 MILLIGRAM(S): 10 TABLET, FILM COATED ORAL at 07:30

## 2024-10-04 RX ADMIN — OXYCODONE HYDROCHLORIDE 10 MILLIGRAM(S): 30 TABLET, FILM COATED, EXTENDED RELEASE ORAL at 23:00

## 2024-10-04 RX ADMIN — Medication 17 GRAM(S): at 11:10

## 2024-10-04 RX ADMIN — Medication 500 MILLIGRAM(S): at 06:32

## 2024-10-04 RX ADMIN — Medication 500 MILLIGRAM(S): at 14:33

## 2024-10-04 RX ADMIN — Medication 4 UNIT(S): at 07:57

## 2024-10-04 RX ADMIN — Medication 4 UNIT(S): at 11:17

## 2024-10-04 RX ADMIN — Medication 2: at 16:46

## 2024-10-04 RX ADMIN — Medication 2 TABLET(S): at 21:35

## 2024-10-04 RX ADMIN — Medication 975 MILLIGRAM(S): at 11:10

## 2024-10-04 RX ADMIN — EZETIMIBE 10 MILLIGRAM(S): 10 TABLET ORAL at 12:39

## 2024-10-04 RX ADMIN — GABAPENTIN 100 MILLIGRAM(S): 800 TABLET, FILM COATED ORAL at 21:35

## 2024-10-04 RX ADMIN — OXYCODONE HYDROCHLORIDE 10 MILLIGRAM(S): 30 TABLET, FILM COATED, EXTENDED RELEASE ORAL at 23:31

## 2024-10-04 RX ADMIN — Medication 975 MILLIGRAM(S): at 12:10

## 2024-10-04 RX ADMIN — CHLORHEXIDINE GLUCONATE ORAL RINSE 1 APPLICATION(S): 1.2 SOLUTION DENTAL at 06:32

## 2024-10-04 RX ADMIN — Medication 500 MILLIGRAM(S): at 06:31

## 2024-10-04 RX ADMIN — Medication 12.5 MILLIGRAM(S): at 17:24

## 2024-10-04 RX ADMIN — Medication 100 MILLIGRAM(S): at 12:39

## 2024-10-04 RX ADMIN — OXYCODONE HYDROCHLORIDE 5 MILLIGRAM(S): 30 TABLET, FILM COATED, EXTENDED RELEASE ORAL at 12:00

## 2024-10-04 RX ADMIN — Medication 975 MILLIGRAM(S): at 17:24

## 2024-10-04 RX ADMIN — Medication 30 MILLIGRAM(S): at 12:39

## 2024-10-04 RX ADMIN — KETOROLAC TROMETHAMINE 15 MILLIGRAM(S): 10 TABLET, FILM COATED ORAL at 06:32

## 2024-10-04 RX ADMIN — VANCOMYCIN HCL-SODIUM CHLORIDE IV SOLN 1.5 GM/250ML-0.9% 250 MILLIGRAM(S): 1.5-0.9/25 SOLUTION at 13:20

## 2024-10-04 NOTE — PHYSICAL THERAPY INITIAL EVALUATION ADULT - ADDITIONAL COMMENTS
Pt reports living with daughter and grand-daughter (20 y/o) in a private house. 4+5 steps to enter, plus 5 steps to living space. All with rails. Pt independent at baseline, no device. Owns straight cane. Reports daughter and grand-daughter can assist as needed

## 2024-10-05 DIAGNOSIS — D62 ACUTE POSTHEMORRHAGIC ANEMIA: ICD-10-CM

## 2024-10-05 LAB
ANION GAP SERPL CALC-SCNC: 13 MMOL/L — SIGNIFICANT CHANGE UP (ref 5–17)
BUN SERPL-MCNC: 23.8 MG/DL — HIGH (ref 8–20)
CALCIUM SERPL-MCNC: 7.7 MG/DL — LOW (ref 8.4–10.5)
CHLORIDE SERPL-SCNC: 103 MMOL/L — SIGNIFICANT CHANGE UP (ref 96–108)
CO2 SERPL-SCNC: 22 MMOL/L — SIGNIFICANT CHANGE UP (ref 22–29)
CREAT SERPL-MCNC: 0.89 MG/DL — SIGNIFICANT CHANGE UP (ref 0.5–1.3)
EGFR: 95 ML/MIN/1.73M2 — SIGNIFICANT CHANGE UP
FIBRINOGEN AG PPP IA-MCNC: 301 MG/DL — SIGNIFICANT CHANGE UP (ref 233–496)
GLUCOSE BLDC GLUCOMTR-MCNC: 167 MG/DL — HIGH (ref 70–99)
GLUCOSE BLDC GLUCOMTR-MCNC: 174 MG/DL — HIGH (ref 70–99)
GLUCOSE BLDC GLUCOMTR-MCNC: 181 MG/DL — HIGH (ref 70–99)
GLUCOSE BLDC GLUCOMTR-MCNC: 200 MG/DL — HIGH (ref 70–99)
GLUCOSE SERPL-MCNC: 166 MG/DL — HIGH (ref 70–99)
HCT VFR BLD CALC: 27.8 % — LOW (ref 39–50)
HGB BLD-MCNC: 9.3 G/DL — LOW (ref 13–17)
MAGNESIUM SERPL-MCNC: 1.9 MG/DL — SIGNIFICANT CHANGE UP (ref 1.6–2.6)
MCHC RBC-ENTMCNC: 29.3 PG — SIGNIFICANT CHANGE UP (ref 27–34)
MCHC RBC-ENTMCNC: 33.5 GM/DL — SIGNIFICANT CHANGE UP (ref 32–36)
MCV RBC AUTO: 87.7 FL — SIGNIFICANT CHANGE UP (ref 80–100)
PLATELET # BLD AUTO: 125 K/UL — LOW (ref 150–400)
POTASSIUM SERPL-MCNC: 3.7 MMOL/L — SIGNIFICANT CHANGE UP (ref 3.5–5.3)
POTASSIUM SERPL-SCNC: 3.7 MMOL/L — SIGNIFICANT CHANGE UP (ref 3.5–5.3)
RBC # BLD: 3.17 M/UL — LOW (ref 4.2–5.8)
RBC # FLD: 16.6 % — HIGH (ref 10.3–14.5)
SODIUM SERPL-SCNC: 138 MMOL/L — SIGNIFICANT CHANGE UP (ref 135–145)
WBC # BLD: 12.46 K/UL — HIGH (ref 3.8–10.5)
WBC # FLD AUTO: 12.46 K/UL — HIGH (ref 3.8–10.5)

## 2024-10-05 PROCEDURE — 93010 ELECTROCARDIOGRAM REPORT: CPT

## 2024-10-05 PROCEDURE — 99291 CRITICAL CARE FIRST HOUR: CPT

## 2024-10-05 PROCEDURE — 99292 CRITICAL CARE ADDL 30 MIN: CPT

## 2024-10-05 PROCEDURE — 71045 X-RAY EXAM CHEST 1 VIEW: CPT | Mod: 26

## 2024-10-05 RX ORDER — GABAPENTIN 800 MG/1
300 TABLET, FILM COATED ORAL
Refills: 0 | Status: DISCONTINUED | OUTPATIENT
Start: 2024-10-05 | End: 2024-10-07

## 2024-10-05 RX ORDER — FUROSEMIDE 10 MG/ML
20 INJECTION INTRAVENOUS ONCE
Refills: 0 | Status: COMPLETED | OUTPATIENT
Start: 2024-10-05 | End: 2024-10-05

## 2024-10-05 RX ORDER — MAGNESIUM SULFATE 500 MG/ML
2 VIAL (ML) INJECTION ONCE
Refills: 0 | Status: COMPLETED | OUTPATIENT
Start: 2024-10-05 | End: 2024-10-05

## 2024-10-05 RX ORDER — FUROSEMIDE 10 MG/ML
40 INJECTION INTRAVENOUS ONCE
Refills: 0 | Status: DISCONTINUED | OUTPATIENT
Start: 2024-10-05 | End: 2024-10-05

## 2024-10-05 RX ORDER — METOPROLOL TARTRATE 50 MG
25 TABLET ORAL
Refills: 0 | Status: DISCONTINUED | OUTPATIENT
Start: 2024-10-05 | End: 2024-10-07

## 2024-10-05 RX ADMIN — Medication 975 MILLIGRAM(S): at 13:18

## 2024-10-05 RX ADMIN — CHLORHEXIDINE GLUCONATE ORAL RINSE 1 APPLICATION(S): 1.2 SOLUTION DENTAL at 06:18

## 2024-10-05 RX ADMIN — Medication 500 MILLIGRAM(S): at 17:25

## 2024-10-05 RX ADMIN — Medication 975 MILLIGRAM(S): at 17:25

## 2024-10-05 RX ADMIN — Medication 40 MILLIEQUIVALENT(S): at 06:17

## 2024-10-05 RX ADMIN — GABAPENTIN 300 MILLIGRAM(S): 800 TABLET, FILM COATED ORAL at 17:25

## 2024-10-05 RX ADMIN — Medication 1: at 21:21

## 2024-10-05 RX ADMIN — Medication 2: at 12:24

## 2024-10-05 RX ADMIN — Medication 975 MILLIGRAM(S): at 18:25

## 2024-10-05 RX ADMIN — Medication 17 GRAM(S): at 12:16

## 2024-10-05 RX ADMIN — Medication 0.4 MILLIGRAM(S): at 06:17

## 2024-10-05 RX ADMIN — GABAPENTIN 300 MILLIGRAM(S): 800 TABLET, FILM COATED ORAL at 06:17

## 2024-10-05 RX ADMIN — OXYCODONE HYDROCHLORIDE 10 MILLIGRAM(S): 30 TABLET, FILM COATED, EXTENDED RELEASE ORAL at 21:20

## 2024-10-05 RX ADMIN — PANTOPRAZOLE SODIUM 40 MILLIGRAM(S): 40 TABLET, DELAYED RELEASE ORAL at 12:16

## 2024-10-05 RX ADMIN — EZETIMIBE 10 MILLIGRAM(S): 10 TABLET ORAL at 13:43

## 2024-10-05 RX ADMIN — Medication 30 MILLIGRAM(S): at 13:43

## 2024-10-05 RX ADMIN — Medication 0.4 MILLIGRAM(S): at 17:25

## 2024-10-05 RX ADMIN — Medication 4 MILLIGRAM(S): at 12:51

## 2024-10-05 RX ADMIN — Medication 2: at 08:22

## 2024-10-05 RX ADMIN — Medication 4 UNIT(S): at 16:39

## 2024-10-05 RX ADMIN — Medication 975 MILLIGRAM(S): at 06:17

## 2024-10-05 RX ADMIN — FUROSEMIDE 20 MILLIGRAM(S): 10 INJECTION INTRAVENOUS at 18:22

## 2024-10-05 RX ADMIN — Medication 500 MILLIGRAM(S): at 06:17

## 2024-10-05 RX ADMIN — AMIODARONE HYDROCHLORIDE 400 MILLIGRAM(S): 50 INJECTION, SOLUTION INTRAVENOUS at 06:17

## 2024-10-05 RX ADMIN — ATORVASTATIN CALCIUM 80 MILLIGRAM(S): 10 TABLET, FILM COATED ORAL at 21:20

## 2024-10-05 RX ADMIN — OXYCODONE HYDROCHLORIDE 10 MILLIGRAM(S): 30 TABLET, FILM COATED, EXTENDED RELEASE ORAL at 04:22

## 2024-10-05 RX ADMIN — Medication 75 MILLIGRAM(S): at 12:16

## 2024-10-05 RX ADMIN — OXYCODONE HYDROCHLORIDE 10 MILLIGRAM(S): 30 TABLET, FILM COATED, EXTENDED RELEASE ORAL at 11:00

## 2024-10-05 RX ADMIN — CHLORHEXIDINE GLUCONATE ORAL RINSE 1 APPLICATION(S): 1.2 SOLUTION DENTAL at 17:27

## 2024-10-05 RX ADMIN — Medication 2: at 16:39

## 2024-10-05 RX ADMIN — OXYCODONE HYDROCHLORIDE 10 MILLIGRAM(S): 30 TABLET, FILM COATED, EXTENDED RELEASE ORAL at 03:06

## 2024-10-05 RX ADMIN — Medication 500 MILLIGRAM(S): at 21:20

## 2024-10-05 RX ADMIN — Medication 4 UNIT(S): at 12:23

## 2024-10-05 RX ADMIN — FUROSEMIDE 20 MILLIGRAM(S): 10 INJECTION INTRAVENOUS at 00:15

## 2024-10-05 RX ADMIN — OXYCODONE HYDROCHLORIDE 10 MILLIGRAM(S): 30 TABLET, FILM COATED, EXTENDED RELEASE ORAL at 10:52

## 2024-10-05 RX ADMIN — Medication 25 MILLIGRAM(S): at 04:08

## 2024-10-05 RX ADMIN — Medication 975 MILLIGRAM(S): at 07:00

## 2024-10-05 RX ADMIN — OXYCODONE HYDROCHLORIDE 10 MILLIGRAM(S): 30 TABLET, FILM COATED, EXTENDED RELEASE ORAL at 22:20

## 2024-10-05 RX ADMIN — Medication 25 GRAM(S): at 06:16

## 2024-10-05 RX ADMIN — Medication 975 MILLIGRAM(S): at 12:16

## 2024-10-05 RX ADMIN — Medication 81 MILLIGRAM(S): at 12:16

## 2024-10-05 RX ADMIN — Medication 2 TABLET(S): at 21:20

## 2024-10-05 RX ADMIN — Medication 4 UNIT(S): at 08:23

## 2024-10-05 RX ADMIN — Medication 500 MILLIGRAM(S): at 16:36

## 2024-10-05 RX ADMIN — ENOXAPARIN SODIUM 40 MILLIGRAM(S): 150 INJECTION SUBCUTANEOUS at 12:16

## 2024-10-05 RX ADMIN — Medication 25 MILLIGRAM(S): at 17:25

## 2024-10-05 NOTE — DIETITIAN INITIAL EVALUATION ADULT - NS FNS DIET ORDER
Diet, Regular:   Consistent Carbohydrate {No Snacks} (CSTCHO)  DASH/TLC {Sodium & Cholesterol Restricted} (DASH) (10-03-24 @ 16:04)

## 2024-10-05 NOTE — DIETITIAN INITIAL EVALUATION ADULT - NSFNSGIIOFT_GEN_A_CORE
10-04-24 @ 07:01  -  10-05-24 @ 07:00  --------------------------------------------------------  OUT:    Chest Tube (mL): 50 mL    Chest Tube (mL): 140 mL  Total OUT: 190 mL    Total NET: -190 mL

## 2024-10-05 NOTE — DIETITIAN INITIAL EVALUATION ADULT - PERTINENT LABORATORY DATA
10-05 Na138 mmol/L Glu 166 mg/dL[H] K+ 3.7 mmol/L Cr  0.89 mg/dL BUN 23.8 mg/dL[H]     POCT Blood Glucose.: 200 mg/dL (10-05-24 @ 12:18)  A1C with Estimated Average Glucose Result: 7.2 % (09-20-24 @ 15:55)

## 2024-10-05 NOTE — DIETITIAN INITIAL EVALUATION ADULT - PERTINENT MEDS FT
MEDICATIONS  (STANDING):  ascorbic acid 500 milliGRAM(s) Oral two times a day  bisacodyl Suppository 10 milliGRAM(s) Rectal once  clopidogrel Tablet 75 milliGRAM(s) Oral daily  dextrose 50% Injectable 50 milliLiter(s) IV Push every 15 minutes  DULoxetine 30 milliGRAM(s) Oral daily  enoxaparin Injectable 40 milliGRAM(s) SubCutaneous every 24 hours  ezetimibe 10 milliGRAM(s) Oral daily  glucagon  Injectable 1 milliGRAM(s) IntraMuscular once  insulin lispro (ADMELOG) corrective regimen sliding scale   SubCutaneous three times a day before meals  methocarbamol 500 milliGRAM(s) Oral every 8 hours  metoprolol tartrate 25 milliGRAM(s) Oral two times a day  pantoprazole    Tablet 40 milliGRAM(s) Oral daily  polyethylene glycol 3350 17 Gram(s) Oral daily  senna 2 Tablet(s) Oral at bedtime    MEDICATIONS  (PRN):  ondansetron Injectable 4 milliGRAM(s) IV Push every 6 hours PRN Nausea and/or Vomiting  oxyCODONE    IR 5 milliGRAM(s) Oral every 4 hours PRN Moderate Pain (4 - 6)

## 2024-10-05 NOTE — DIETITIAN INITIAL EVALUATION ADULT - ORAL INTAKE PTA/DIET HISTORY
Patient tolerated breakfast tray well this AM and ate 100%. Pt also ate lunch this afternoon, but vomited everything shortly after. Nursing aware of the same as well. Pt states this was the first episode of N/V since admission. Pt states PTA he was not following any diet restrictions and eating about 2 meals/day. Pt and family provided with education on DASH/TLC, Consistent carbohydrate diet. Pt and family with good understanding. Pt c/o constipation x4 days and nursing aware of the same. Pt states his UBW is about 180 lbs, and pt stating he was losing weight while taking Trulicity. States he is no longer on this medication and does not know if he gained weight back. Current weight 190 lbs. Mild edema noted which may influence weights. Pt does not meet criteria for malnutrition.

## 2024-10-05 NOTE — DIETITIAN INITIAL EVALUATION ADULT - OTHER INFO
Pt is a  64 year old male with past medical history of HTN, HLD, type 2 diabetes mellitus, CVA (residual left side weakness), carotid disease, BPH, coronary artery disease with PCI to LCx in 2006.  Last  cardiac catheterization revealed multivessel coronary artery disease. Pt reports shortness of breath, exertional dyspnea. Pt  denies chest pain, denies cornell  palpitation, syncope, peripheral edema and any other related issues. Pt had CABG x 3 on 10/02/24 with Dr. Figueroa   - S/p CABG x 3 (LIMA-LAD, SVG-OM, SVG-PDA) with Dr Figueroa

## 2024-10-05 NOTE — DIETITIAN INITIAL EVALUATION ADULT - NSICDXPASTMEDICALHX_GEN_ALL_CORE_FT
History  Chief Complaint   Patient presents with    Shortness of Breath     Pt report that she tested positive for COVID on the 11th, Pt reports SOB increasing, also reports pain in her right lung, has  a hx of provoked PEs and this feels similar     Poly Youssef is a 55 y.o.  year old female  Past Medical History:  No date: Allergic  No date: Asthma  No date: Cancer (HCC)  No date: Cardiomyopathy (HCC)  No date: Coronary artery disease  No date: Disease of thyroid gland  No date: Heart disease  No date: Pacemaker  No date: Shingles  Social History    Tobacco Use      Smoking status: Never      Smokeless tobacco: Never    Alcohol use: Not Currently    Drug use: Never    Patient presents with:  Shortness of Breath: Pt report that she tested positive for COVID on the 11th, Pt reports SOB increasing, also reports pain in her right lung, has  a hx of provoked PEs and this feels similar  History of pulmonary embolism in the past.  She has some symptoms that are very similar.  Recent COVID infection.  She feels short of breath.    History obtained directly from the PATIENT              History provided by:  Patient   used: No    Shortness of Breath  Associated symptoms: no abdominal pain, no chest pain, no cough, no ear pain, no fever, no rash, no sore throat and no vomiting        Prior to Admission Medications   Prescriptions Last Dose Informant Patient Reported? Taking?   Fluticasone-Salmeterol (Advair Diskus) 250-50 mcg/dose inhaler  Self No No   Sig: Inhale 1 puff 2 (two) times a day   Magnesium 100 MG TABS  Self Yes No   Sig: Take by mouth   Multiple Vitamin (MULTI-DAY PO)  Self Yes No   Sig: Take 1 tablet by mouth in the morning   albuterol (PROVENTIL HFA,VENTOLIN HFA) 90 mcg/act inhaler  Self Yes No   Sig: Inhale 2 puffs every 6 (six) hours as needed   b complex vitamins capsule  Self Yes No   Sig: Take 1 capsule by mouth daily   carvedilol (COREG) 12.5 mg tablet  Self Yes No   Sig:  Take 12.5 mg by mouth 2 (two) times a day   levothyroxine 75 mcg tablet  Self No No   Sig: TAKE 1 TABLET DAILY   montelukast (SINGULAIR) 10 mg tablet  Self No No   Sig: TAKE 1 TABLET DAILY AT BEDTIME      Facility-Administered Medications: None       Past Medical History:   Diagnosis Date    Allergic     Asthma     Cancer (HCC)     Cardiomyopathy (HCC)     Coronary artery disease     Disease of thyroid gland     Heart disease     Pacemaker     Shingles        Past Surgical History:   Procedure Laterality Date    BREAST SURGERY      CARDIAC SURGERY      INSERT / REPLACE / REMOVE PACEMAKER      KNEE SURGERY      LYMPH NODE BIOPSY         Family History   Problem Relation Age of Onset    Heart disease Mother     Cancer Mother     Dementia Mother     Thyroid disease Mother     Heart disease Father     Depression Father     Diabetes Father     Hypertension Brother      I have reviewed and agree with the history as documented.    E-Cigarette/Vaping     E-Cigarette/Vaping Substances     Social History     Tobacco Use    Smoking status: Never    Smokeless tobacco: Never   Substance Use Topics    Alcohol use: Not Currently    Drug use: Never       Review of Systems   Constitutional:  Negative for chills and fever.   HENT:  Negative for ear pain and sore throat.    Eyes:  Negative for pain and visual disturbance.   Respiratory:  Positive for chest tightness and shortness of breath. Negative for cough.    Cardiovascular:  Negative for chest pain and palpitations.   Gastrointestinal:  Negative for abdominal pain and vomiting.   Genitourinary:  Negative for dysuria and hematuria.   Musculoskeletal:  Negative for arthralgias and back pain.   Skin:  Negative for color change and rash.   Neurological:  Negative for seizures and syncope.   All other systems reviewed and are negative.      Physical Exam  Physical Exam  Vitals and nursing note reviewed.   Constitutional:       General: She is not in acute distress.     Appearance: She  is well-developed.   HENT:      Head: Normocephalic and atraumatic.   Eyes:      Conjunctiva/sclera: Conjunctivae normal.   Cardiovascular:      Rate and Rhythm: Normal rate and regular rhythm.      Heart sounds: No murmur heard.  Pulmonary:      Effort: Pulmonary effort is normal. No respiratory distress.      Breath sounds: Rhonchi present.   Chest:      Chest wall: No mass, deformity or edema.   Abdominal:      Palpations: Abdomen is soft.      Tenderness: There is no abdominal tenderness.   Musculoskeletal:         General: No swelling.      Cervical back: Neck supple.   Skin:     General: Skin is warm and dry.      Capillary Refill: Capillary refill takes less than 2 seconds.   Neurological:      General: No focal deficit present.      Mental Status: She is alert and oriented to person, place, and time.   Psychiatric:         Mood and Affect: Mood normal.         Vital Signs  ED Triage Vitals   Temperature Pulse Respirations Blood Pressure SpO2   06/24/24 0957 06/24/24 0952 06/24/24 0952 06/24/24 0952 06/24/24 0952   98.4 °F (36.9 °C) 85 20 133/72 98 %      Temp Source Heart Rate Source Patient Position - Orthostatic VS BP Location FiO2 (%)   06/24/24 0957 06/24/24 0952 06/24/24 0952 06/24/24 0952 --   Oral Monitor Lying Right arm       Pain Score       --                  Vitals:    06/24/24 0952 06/24/24 1115 06/24/24 1145 06/24/24 1300   BP: 133/72 136/65 125/62 128/61   Pulse: 85 73 86 84   Patient Position - Orthostatic VS: Lying Lying Lying Lying         Visual Acuity      ED Medications  Medications   iohexol (OMNIPAQUE) 350 MG/ML injection (MULTI-DOSE) 85 mL (85 mL Intravenous Given 6/24/24 1134)       Diagnostic Studies  Results Reviewed       Procedure Component Value Units Date/Time    HS Troponin I 2hr [[id number]]  (Normal) Collected: 06/24/24 1206    Lab Status: Final result Specimen: Blood from Arm, Right Updated: 06/24/24 1246     hs TnI 2hr 6 ng/L      Delta 2hr hsTnI 0 ng/L     B-Type  Natriuretic Peptide(BNP) [[id number]]  (Abnormal) Collected: 06/24/24 1008    Lab Status: Final result Specimen: Blood from Arm, Right Updated: 06/24/24 1141      pg/mL     Basic metabolic panel [[id number]] Collected: 06/24/24 1008    Lab Status: Final result Specimen: Blood from Arm, Right Updated: 06/24/24 1039     Sodium 140 mmol/L      Potassium 4.1 mmol/L      Chloride 107 mmol/L      CO2 29 mmol/L      ANION GAP 4 mmol/L      BUN 11 mg/dL      Creatinine 0.69 mg/dL      Glucose 95 mg/dL      Calcium 9.1 mg/dL      eGFR 96 ml/min/1.73sq m     Narrative:      National Kid[employer] Foundation guidelines for Chronic Kidney Disease (CKD):     Stage 1 with normal or high GFR (GFR > 90 mL/min/1.73 square meters)    Stage 2 Mild CKD (GFR = 60-89 mL/min/1.73 square meters)    Stage 3A Moderate CKD (GFR = 45-59 mL/min/1.73 square meters)    Stage 3B Moderate CKD (GFR = 30-44 mL/min/1.73 square meters)    Stage 4 Severe CKD (GFR = 15-29 mL/min/1.73 square meters)    Stage 5 End Stage CKD (GFR <15 mL/min/1.73 square meters)  Note: GFR calculation is accurate only with a steady state creatinine    Magnesium [[id number]]  (Normal) Collected: 06/24/24 1008    Lab Status: Final result Specimen: Blood from Arm, Right Updated: 06/24/24 1039     Magnesium 2.0 mg/dL     CK [[id number]]  (Normal) Collected: 06/24/24 1008    Lab Status: Final result Specimen: Blood from Arm, Right Updated: 06/24/24 1039     Total CK 39 U/L     HS Troponin 0hr (reflex protocol) [[id number]]  (Normal) Collected: 06/24/24 1008    Lab Status: Final result Specimen: Blood from Arm, Right Updated: 06/24/24 1038     hs TnI 0hr 6 ng/L     CBC and differential [[id number]]  (Abnormal) Collected: 06/24/24 1008    Lab Status: Final result Specimen: Blood from Arm, Right Updated: 06/24/24 1016     WBC 6.80 Thousand/uL      RBC 3.93 Million/uL      Hemoglobin 12.3 g/dL      Hematocrit 37.7 %      MCV 96 fL      MCH 31.3 pg      MCHC 32.6 g/dL      RDW 13.3  %      MPV 10.1 fL      Platelets 338 Thousands/uL      nRBC 0 /100 WBCs      Segmented % 56 %      Immature Grans % 0 %      Lymphocytes % 24 %      Monocytes % 17 %      Eosinophils Relative 2 %      Basophils Relative 1 %      Absolute Neutrophils 3.86 Thousands/µL      Absolute Immature Grans 0.01 Thousand/uL      Absolute Lymphocytes 1.62 Thousands/µL      Absolute Monocytes 1.12 Thousand/µL      Eosinophils Absolute 0.14 Thousand/µL      Basophils Absolute 0.05 Thousands/µL                    CTA ED chest PE Study   Final Result by Alfonzo Bill MD (06/24 1201)      No evidence of pulmonary embolus.                  Workstation performed: [id number]                    Procedures  Procedures         ED Course                               SBIRT 22yo+      Flowsheet Row Most Recent Value   Initial Alcohol Screen: US AUDIT-C     1. How often do you have a drink containing alcohol? 0 Filed at: 06/24/2024 1008   2. How many drinks containing alcohol do you have on a typical day you are drinking?  0 Filed at: 06/24/2024 1008   3a. Male UNDER 65: How often do you have five or more drinks on one occasion? 0 Filed at: 06/24/2024 1008   3b. FEMALE Any Age, or MALE 65+: How often do you have 4 or more drinks on one occassion? 0 Filed at: 06/24/2024 1008   Audit-C Score 0 Filed at: 06/24/2024 1008   AGATA: How many times in the past year have you...    Used an illegal drug or used a prescription medication for non-medical reasons? Never Filed at: 06/24/2024 1008                      Medical Decision Making  Patient presented to emergency department with a chief complaint of shortness of breath.    Differential diagnose includes but not limited to: Hypoxia, anemia, bronchitis, COPD, pneumonia, pleural effusion, CHF, angina / AMI, fatigue.    I have ordered CBC.  This was done to assess for leukocytosis versus leukopenia as possible indicators of infection viral versus bacterial.  Also included hemoglobin and  hematocrit for assess for anemia.  And assess platelet numbers.  Thrombocytosis as a marker of inflammation and of course thrombocytopenia assess result of coagulation disorder or DIC.    Metabolic panel to assess for electrolyte deficiency, assess kidney function, and blood sugar levels to assess for hypoglycemia versus hyperglycemia as possible causes of the patient's symptoms.    Troponin - as a marker of cardiac injury.    BNP -elevated levels especially when compared to baseline levels would indicate possible heart failure or right-sided heart strain.          Problems Addressed:  Dyspnea: acute illness or injury     Details: NEG PE study in the ER     Amount and/or Complexity of Data Reviewed  Labs: ordered.  Radiology: ordered.    Risk  Prescription drug management.             Disposition  Final diagnoses:   Dyspnea     Time reflects when diagnosis was documented in both MDM as applicable and the Disposition within this note       Time User Action Codes Description Comment    6/24/2024 12:34 PM Cyrus Gardner Add [R06.00] Dyspnea           ED Disposition       ED Disposition   Discharge    Condition   Stable    Date/Time   Mon Jun 24, 2024 1234    Comment   Poly Youssef discharge to home/self care.                   Follow-up Information       Follow up With Specialties Details Why Contact Info    Enyd Arriola MD Internal Medicine, Hospice Services, Palliative Care In 1 week  [address]  [address]  [facility] [address]  [phone]              Discharge Medication List as of 6/24/2024 12:35 PM        START taking these medications    Details   predniSONE 50 mg tablet Take 1 tablet (50 mg total) by mouth daily, Starting Mon 6/24/2024, Normal           CONTINUE these medications which have NOT CHANGED    Details   albuterol (PROVENTIL HFA,VENTOLIN HFA) 90 mcg/act inhaler Inhale 2 puffs every 6 (six) hours as needed, Starting Fri 1/15/2016, Historical Med      b complex vitamins capsule Take 1  capsule by mouth daily, Historical Med      carvedilol (COREG) 12.5 mg tablet Take 12.5 mg by mouth 2 (two) times a day, Starting Tue 9/6/2022, Historical Med      Fluticasone-Salmeterol (Advair Diskus) 250-50 mcg/dose inhaler Inhale 1 puff 2 (two) times a day, Starting Tue 6/4/2024, Normal      levothyroxine 75 mcg tablet TAKE 1 TABLET DAILY, Starting Wed 12/6/2023, Normal      Magnesium 100 MG TABS Take by mouth, Historical Med      montelukast (SINGULAIR) 10 mg tablet TAKE 1 TABLET DAILY AT BEDTIME, Starting Wed 12/6/2023, Normal      Multiple Vitamin (MULTI-DAY PO) Take 1 tablet by mouth in the morning, Historical Med             No discharge procedures on file.    PDMP Review       None            ED Provider  Electronically Signed by             Cyrus Gardner MD  06/28/24 0042     PAST MEDICAL HISTORY:  BPH (benign prostatic hyperplasia)     CAD S/P percutaneous coronary angioplasty 7/10 5 stents and 7/14 one stent    History of hyperlipidemia     Hypertension     Neuropathy     Type 2 diabetes mellitus

## 2024-10-06 LAB
ANION GAP SERPL CALC-SCNC: 12 MMOL/L — SIGNIFICANT CHANGE UP (ref 5–17)
BUN SERPL-MCNC: 22.5 MG/DL — HIGH (ref 8–20)
CALCIUM SERPL-MCNC: 7.9 MG/DL — LOW (ref 8.4–10.5)
CHLORIDE SERPL-SCNC: 101 MMOL/L — SIGNIFICANT CHANGE UP (ref 96–108)
CO2 SERPL-SCNC: 21 MMOL/L — LOW (ref 22–29)
CREAT SERPL-MCNC: 0.87 MG/DL — SIGNIFICANT CHANGE UP (ref 0.5–1.3)
EGFR: 95 ML/MIN/1.73M2 — SIGNIFICANT CHANGE UP
GLUCOSE BLDC GLUCOMTR-MCNC: 127 MG/DL — HIGH (ref 70–99)
GLUCOSE BLDC GLUCOMTR-MCNC: 143 MG/DL — HIGH (ref 70–99)
GLUCOSE BLDC GLUCOMTR-MCNC: 170 MG/DL — HIGH (ref 70–99)
GLUCOSE BLDC GLUCOMTR-MCNC: 208 MG/DL — HIGH (ref 70–99)
GLUCOSE BLDC GLUCOMTR-MCNC: 235 MG/DL — HIGH (ref 70–99)
GLUCOSE SERPL-MCNC: 140 MG/DL — HIGH (ref 70–99)
HCT VFR BLD CALC: 27.1 % — LOW (ref 39–50)
HGB BLD-MCNC: 8.8 G/DL — LOW (ref 13–17)
MAGNESIUM SERPL-MCNC: 2.2 MG/DL — SIGNIFICANT CHANGE UP (ref 1.8–2.6)
MCHC RBC-ENTMCNC: 28.7 PG — SIGNIFICANT CHANGE UP (ref 27–34)
MCHC RBC-ENTMCNC: 32.5 GM/DL — SIGNIFICANT CHANGE UP (ref 32–36)
MCV RBC AUTO: 88.3 FL — SIGNIFICANT CHANGE UP (ref 80–100)
PLATELET # BLD AUTO: 128 K/UL — LOW (ref 150–400)
POTASSIUM SERPL-MCNC: 4.1 MMOL/L — SIGNIFICANT CHANGE UP (ref 3.5–5.3)
POTASSIUM SERPL-SCNC: 4.1 MMOL/L — SIGNIFICANT CHANGE UP (ref 3.5–5.3)
RBC # BLD: 3.07 M/UL — LOW (ref 4.2–5.8)
RBC # FLD: 16.3 % — HIGH (ref 10.3–14.5)
SODIUM SERPL-SCNC: 134 MMOL/L — LOW (ref 135–145)
WBC # BLD: 9.49 K/UL — SIGNIFICANT CHANGE UP (ref 3.8–10.5)
WBC # FLD AUTO: 9.49 K/UL — SIGNIFICANT CHANGE UP (ref 3.8–10.5)

## 2024-10-06 PROCEDURE — 99024 POSTOP FOLLOW-UP VISIT: CPT

## 2024-10-06 PROCEDURE — 71045 X-RAY EXAM CHEST 1 VIEW: CPT | Mod: 26

## 2024-10-06 RX ORDER — BISACODYL 5 MG/1
10 TABLET, COATED ORAL ONCE
Refills: 0 | Status: COMPLETED | OUTPATIENT
Start: 2024-10-06 | End: 2024-10-06

## 2024-10-06 RX ORDER — LACTULOSE 10 G/15ML
20 SOLUTION ORAL; RECTAL EVERY 8 HOURS
Refills: 0 | Status: DISCONTINUED | OUTPATIENT
Start: 2024-10-06 | End: 2024-10-07

## 2024-10-06 RX ADMIN — Medication 2: at 22:30

## 2024-10-06 RX ADMIN — Medication 975 MILLIGRAM(S): at 00:11

## 2024-10-06 RX ADMIN — OXYCODONE HYDROCHLORIDE 5 MILLIGRAM(S): 30 TABLET, FILM COATED, EXTENDED RELEASE ORAL at 07:05

## 2024-10-06 RX ADMIN — LACTULOSE 20 GRAM(S): 10 SOLUTION ORAL; RECTAL at 22:00

## 2024-10-06 RX ADMIN — GABAPENTIN 300 MILLIGRAM(S): 800 TABLET, FILM COATED ORAL at 06:04

## 2024-10-06 RX ADMIN — CHLORHEXIDINE GLUCONATE ORAL RINSE 1 APPLICATION(S): 1.2 SOLUTION DENTAL at 06:05

## 2024-10-06 RX ADMIN — Medication 4 UNIT(S): at 07:50

## 2024-10-06 RX ADMIN — Medication 81 MILLIGRAM(S): at 11:54

## 2024-10-06 RX ADMIN — Medication 500 MILLIGRAM(S): at 22:31

## 2024-10-06 RX ADMIN — Medication 17 GRAM(S): at 11:54

## 2024-10-06 RX ADMIN — Medication 0.4 MILLIGRAM(S): at 06:04

## 2024-10-06 RX ADMIN — PANTOPRAZOLE SODIUM 40 MILLIGRAM(S): 40 TABLET, DELAYED RELEASE ORAL at 11:58

## 2024-10-06 RX ADMIN — Medication 30 MILLIGRAM(S): at 11:55

## 2024-10-06 RX ADMIN — Medication 75 MILLIGRAM(S): at 11:54

## 2024-10-06 RX ADMIN — EZETIMIBE 10 MILLIGRAM(S): 10 TABLET ORAL at 11:55

## 2024-10-06 RX ADMIN — ATORVASTATIN CALCIUM 80 MILLIGRAM(S): 10 TABLET, FILM COATED ORAL at 22:31

## 2024-10-06 RX ADMIN — Medication 25 MILLIGRAM(S): at 06:04

## 2024-10-06 RX ADMIN — Medication 975 MILLIGRAM(S): at 01:11

## 2024-10-06 RX ADMIN — Medication 500 MILLIGRAM(S): at 06:05

## 2024-10-06 RX ADMIN — CHLORHEXIDINE GLUCONATE ORAL RINSE 1 APPLICATION(S): 1.2 SOLUTION DENTAL at 16:55

## 2024-10-06 RX ADMIN — Medication 4 UNIT(S): at 12:02

## 2024-10-06 RX ADMIN — Medication 4 UNIT(S): at 16:49

## 2024-10-06 RX ADMIN — GABAPENTIN 300 MILLIGRAM(S): 800 TABLET, FILM COATED ORAL at 16:52

## 2024-10-06 RX ADMIN — Medication 4: at 12:02

## 2024-10-06 RX ADMIN — BISACODYL 10 MILLIGRAM(S): 5 TABLET, COATED ORAL at 19:26

## 2024-10-06 RX ADMIN — ENOXAPARIN SODIUM 40 MILLIGRAM(S): 150 INJECTION SUBCUTANEOUS at 11:55

## 2024-10-06 RX ADMIN — Medication 4 MILLIGRAM(S): at 09:58

## 2024-10-06 RX ADMIN — Medication 500 MILLIGRAM(S): at 16:51

## 2024-10-06 RX ADMIN — Medication 500 MILLIGRAM(S): at 06:04

## 2024-10-06 RX ADMIN — OXYCODONE HYDROCHLORIDE 5 MILLIGRAM(S): 30 TABLET, FILM COATED, EXTENDED RELEASE ORAL at 06:05

## 2024-10-06 RX ADMIN — Medication 0.4 MILLIGRAM(S): at 16:51

## 2024-10-06 RX ADMIN — Medication 25 MILLIGRAM(S): at 16:52

## 2024-10-06 NOTE — CHART NOTE - NSCHARTNOTEFT_GEN_A_CORE
Urology called for pt with incomplete voiding   Pt had hanks removed 10/5, retained for about 8 hours   When pt voided, pt voided 300 and was bladder scanned showing 600 retained  Pt continued to retain >500cc after voiding and required straight catherization   Pt has not had any BMs and is currently ambulating     No working dx warranted for urology inpatient interventions at this time     Would recommend the following:   - Replace hanks   - Continue home dose flomax BID   - Bowel regiment for BMs   - Encourage ambulation     Once the pt is having consistent BMs and ambulating, removed hanks for TOV and highly recommend pt tried to void while standing if possible     Please re-consult urology with any further questions or concerns

## 2024-10-07 ENCOUNTER — TRANSCRIPTION ENCOUNTER (OUTPATIENT)
Age: 64
End: 2024-10-07

## 2024-10-07 VITALS
DIASTOLIC BLOOD PRESSURE: 61 MMHG | OXYGEN SATURATION: 99 % | SYSTOLIC BLOOD PRESSURE: 119 MMHG | HEART RATE: 72 BPM | RESPIRATION RATE: 22 BRPM

## 2024-10-07 DIAGNOSIS — R33.9 RETENTION OF URINE, UNSPECIFIED: ICD-10-CM

## 2024-10-07 LAB
ANION GAP SERPL CALC-SCNC: 11 MMOL/L — SIGNIFICANT CHANGE UP (ref 5–17)
BUN SERPL-MCNC: 18.9 MG/DL — SIGNIFICANT CHANGE UP (ref 8–20)
CALCIUM SERPL-MCNC: 8.2 MG/DL — LOW (ref 8.4–10.5)
CHLORIDE SERPL-SCNC: 102 MMOL/L — SIGNIFICANT CHANGE UP (ref 96–108)
CO2 SERPL-SCNC: 24 MMOL/L — SIGNIFICANT CHANGE UP (ref 22–29)
CREAT SERPL-MCNC: 0.9 MG/DL — SIGNIFICANT CHANGE UP (ref 0.5–1.3)
EGFR: 94 ML/MIN/1.73M2 — SIGNIFICANT CHANGE UP
GLUCOSE BLDC GLUCOMTR-MCNC: 182 MG/DL — HIGH (ref 70–99)
GLUCOSE BLDC GLUCOMTR-MCNC: 187 MG/DL — HIGH (ref 70–99)
GLUCOSE SERPL-MCNC: 171 MG/DL — HIGH (ref 70–99)
HCT VFR BLD CALC: 32.6 % — LOW (ref 39–50)
HGB BLD-MCNC: 10.5 G/DL — LOW (ref 13–17)
MAGNESIUM SERPL-MCNC: 2 MG/DL — SIGNIFICANT CHANGE UP (ref 1.6–2.6)
MCHC RBC-ENTMCNC: 29.2 PG — SIGNIFICANT CHANGE UP (ref 27–34)
MCHC RBC-ENTMCNC: 32.2 GM/DL — SIGNIFICANT CHANGE UP (ref 32–36)
MCV RBC AUTO: 90.8 FL — SIGNIFICANT CHANGE UP (ref 80–100)
PLATELET # BLD AUTO: 183 K/UL — SIGNIFICANT CHANGE UP (ref 150–400)
POTASSIUM SERPL-MCNC: 4.2 MMOL/L — SIGNIFICANT CHANGE UP (ref 3.5–5.3)
POTASSIUM SERPL-SCNC: 4.2 MMOL/L — SIGNIFICANT CHANGE UP (ref 3.5–5.3)
RBC # BLD: 3.59 M/UL — LOW (ref 4.2–5.8)
RBC # FLD: 15.8 % — HIGH (ref 10.3–14.5)
SODIUM SERPL-SCNC: 137 MMOL/L — SIGNIFICANT CHANGE UP (ref 135–145)
WBC # BLD: 9.4 K/UL — SIGNIFICANT CHANGE UP (ref 3.8–10.5)
WBC # FLD AUTO: 9.4 K/UL — SIGNIFICANT CHANGE UP (ref 3.8–10.5)

## 2024-10-07 PROCEDURE — 99291 CRITICAL CARE FIRST HOUR: CPT

## 2024-10-07 PROCEDURE — 99024 POSTOP FOLLOW-UP VISIT: CPT

## 2024-10-07 PROCEDURE — 71045 X-RAY EXAM CHEST 1 VIEW: CPT | Mod: 26

## 2024-10-07 RX ORDER — METOPROLOL TARTRATE 50 MG
1 TABLET ORAL
Qty: 30 | Refills: 0
Start: 2024-10-07

## 2024-10-07 RX ORDER — SENNOSIDES 8.6 MG
2 TABLET ORAL
Qty: 30 | Refills: 0
Start: 2024-10-07

## 2024-10-07 RX ORDER — ATORVASTATIN CALCIUM 10 MG/1
1 TABLET, FILM COATED ORAL
Qty: 30 | Refills: 0
Start: 2024-10-07

## 2024-10-07 RX ORDER — OXYCODONE HYDROCHLORIDE 30 MG/1
1 TABLET, FILM COATED, EXTENDED RELEASE ORAL
Qty: 0 | Refills: 0 | DISCHARGE
Start: 2024-10-07

## 2024-10-07 RX ORDER — OXYCODONE HYDROCHLORIDE 30 MG/1
1 TABLET, FILM COATED, EXTENDED RELEASE ORAL
Qty: 28 | Refills: 0
Start: 2024-10-07 | End: 2024-10-13

## 2024-10-07 RX ADMIN — Medication 81 MILLIGRAM(S): at 11:33

## 2024-10-07 RX ADMIN — CHLORHEXIDINE GLUCONATE ORAL RINSE 1 APPLICATION(S): 1.2 SOLUTION DENTAL at 05:00

## 2024-10-07 RX ADMIN — Medication 30 MILLIGRAM(S): at 11:47

## 2024-10-07 RX ADMIN — Medication 2: at 07:48

## 2024-10-07 RX ADMIN — Medication 0.4 MILLIGRAM(S): at 05:01

## 2024-10-07 RX ADMIN — PANTOPRAZOLE SODIUM 40 MILLIGRAM(S): 40 TABLET, DELAYED RELEASE ORAL at 11:33

## 2024-10-07 RX ADMIN — GABAPENTIN 300 MILLIGRAM(S): 800 TABLET, FILM COATED ORAL at 05:00

## 2024-10-07 RX ADMIN — Medication 500 MILLIGRAM(S): at 05:01

## 2024-10-07 RX ADMIN — Medication 4 UNIT(S): at 07:48

## 2024-10-07 RX ADMIN — Medication 2: at 11:34

## 2024-10-07 RX ADMIN — Medication 75 MILLIGRAM(S): at 11:33

## 2024-10-07 RX ADMIN — Medication 25 MILLIGRAM(S): at 05:01

## 2024-10-07 RX ADMIN — EZETIMIBE 10 MILLIGRAM(S): 10 TABLET ORAL at 11:47

## 2024-10-07 RX ADMIN — ENOXAPARIN SODIUM 40 MILLIGRAM(S): 150 INJECTION SUBCUTANEOUS at 11:34

## 2024-10-07 RX ADMIN — Medication 4 UNIT(S): at 11:33

## 2024-10-07 NOTE — DISCHARGE NOTE PROVIDER - NSDCFUADDAPPT_GEN_ALL_CORE_FT
Please follow up with Dr. Figueroa in CT Surgery office on______________      The cardiac surgery office is located on the first floor of [facility] at [address], [place]. Please enter through the lobby. A [facility] employee will then direct you where to go.    Your Care Navigator Nurse Practitioner will be in touch to see you in your home within a few days from discharge. The Follow Your Heart program can help ensure you understand your medications, discharge instructions and answer any questions you may have at that time. They are also a great source to address concerns during the day and may be reached at [phone].  Please follow up with Dr. Figueroa in CT Surgery office on 10/16/24 at 9:30 AM    The cardiac surgery office is located on the first floor of [facility] at [address] [place] [address]. Please enter through the [employer]. A [facility] employee will then direct you where to go.    Your Care Navigator Nurse Practitioner will be in touch to see you in your home within a few days from discharge. The Follow Your Heart program can help ensure you understand your medications, discharge instructions and answer any questions you may have at that time. They are also a great source to address concerns during the day and may be reached at [phone].     Please Follow up with Urology as an outpatient for hanks catheter management

## 2024-10-07 NOTE — PROGRESS NOTE ADULT - SUBJECTIVE AND OBJECTIVE BOX
LAURIE SIMON  MRN#: [id number]  Subjective: The patient was in the CTICU in critical condition at risk for imminent decompensation and was seen and evaluate on AM rounds with the entire multidisciplinary team.     OBJECTIVE:  ICU Vital Signs Last 24 Hrs  T(C): 37.3 (03 Oct 2024 10:00), Max: 37.6 (02 Oct 2024 22:15)  T(F): 99.1 (03 Oct 2024 10:00), Max: 99.7 (02 Oct 2024 22:15)  HR: 77 (03 Oct 2024 10:00) (69 - 88)  BP: 112/63 (03 Oct 2024 10:00) (95/60 - 115/64)  BP(mean): 77 (03 Oct 2024 10:00) (71 - 79)  ABP: 128/51 (03 Oct 2024 10:00) (87/52 - 148/63)  ABP(mean): 72 (03 Oct 2024 10:00) (61 - 95)  RR: 18 (03 Oct 2024 10:00) (0 - 25)  SpO2: 100% (03 Oct 2024 10:00) (100% - 100%)    O2 Parameters below as of 03 Oct 2024 08:00  Patient On (Oxygen Delivery Method): nasal cannula  O2 Flow (L/min): 2          10-02 @ :01  -  10-03 @ 07:00  --------------------------------------------------------  IN: 2198 mL / OUT: 2715 mL / NET: -517 mL    10-03 @ 07:  -  10-03 @ 11:14  --------------------------------------------------------  IN: 439 mL / OUT: 160 mL / NET: 279 mL        PHYSICAL EXAM:Daily Height in cm: 177.8 (03 Oct 2024 03:04)    Daily Weight in k.7 (03 Oct 2024 04:09)  General: WN/WD NAD    HEENT:     + NCAT  + EOMI  - Conjuctival edema   - Icterus   - Thrush   - ETT  - NGT/OGT  Neck:         + FROM    + JVD     - Nodes     - Masses    + Mid-line trachea   - Tracheostomy  Chest:         - Sternal click  - Sternal drainage +  Pacing wires  + Chest tubes  - SubQ emphysema  Lungs:          + CTA   - Rhonchi    - Rales    - Wheezing     - Decreased BS   - Dullness R L  Cardiac:       + S1 + S2    + RRR   - Irregular   - S3  - S4    - Murmurs   - Rub   - Hamman’s sign   Abdomen:    + BS     + Soft    + Non-tender     - Distended    - Organomegaly  - PEG  Extremities:   - Cyanosis U/L   - Clubbing  U/L  - LE/UE Edema   + Capillary refill    + Pulses   Neuro:        + Awake   +  Alert   - Confused   - Lethargic   - Sedated   - Generalized Weakness  Skin:        - Rashes    - Erythema   + Normal incisions   + IV sites intact  - Sacral decubitus      MEDICATIONS  (STANDING):  acetaminophen     Tablet .. 975 milliGRAM(s) Oral every 6 hours  aMIOdarone    Tablet 400 milliGRAM(s) Oral two times a day  ascorbic acid 500 milliGRAM(s) Oral two times a day  aspirin enteric coated 81 milliGRAM(s) Oral daily  atorvastatin 80 milliGRAM(s) Oral at bedtime  cefuroxime  IVPB 1500 milliGRAM(s) IV Intermittent every 8 hours  gabapentin 100 milliGRAM(s) Oral every 8 hours  insulin regular Infusion 2 Unit(s)/Hr (2 mL/Hr) IV Continuous <Continuous>  metoprolol tartrate 12.5 milliGRAM(s) Oral two times a day  pantoprazole    Tablet 40 milliGRAM(s) Oral daily  polyethylene glycol 3350 17 Gram(s) Oral daily  potassium chloride  10 mEq/50 mL IVPB 10 milliEquivalent(s) IV Intermittent every 1 hour  potassium chloride  10 mEq/50 mL IVPB 10 milliEquivalent(s) IV Intermittent every 1 hour  potassium chloride  10 mEq/50 mL IVPB 10 milliEquivalent(s) IV Intermittent every 1 hour  senna 2 Tablet(s) Oral at bedtime  sodium chloride 0.9%. 1000 milliLiter(s) (5 mL/Hr) IV Continuous <Continuous>  sodium chloride 0.9%. 1000 milliLiter(s) (10 mL/Hr) IV Continuous <Continuous>  vancomycin  IVPB 1000 milliGRAM(s) IV Intermittent every 12 hours    MEDICATIONS  (PRN):  ondansetron Injectable 4 milliGRAM(s) IV Push every 6 hours PRN Nausea and/or Vomiting  oxyCODONE    IR 5 milliGRAM(s) Oral every 4 hours PRN Moderate Pain (4 - 6)  oxyCODONE    IR 10 milliGRAM(s) Oral every 4 hours PRN Severe Pain (7 - 10)    LABS: All Lab data reviewed and analyzed                        10.0   14.92 )-----------( 245      ( 03 Oct 2024 02:10 )             29.7    10    141  |  110[H]  |  13.8  ----------------------------<  120[H]  4.2   |  21.0[L]  |  0.80    Ca    8.0[L]      03 Oct 2024 02:15  Mg     2.5     10-03    TPro  4.7[L]  /  Alb  3.0[L]  /  TBili  0.6  /  DBili  x   /  AST  33  /  ALT  18  /  AlkPhos  38[L]  1002    PT/INR - ( 03 Oct 2024 02:15 )   PT: 12.1 sec;   INR: 1.04 ratio         PTT - ( 03 Oct 2024 02:15 )  PTT:25.8 sec   ABG - ( 02 Oct 2024 23:45 )  pH, Arterial: 7.430 pH, Blood: x     /  pCO2: 34    /  pO2: 158   / HCO3: 23    / Base Excess: -1.7  /  SaO2: 100.0     I independently reviewed CXR overnight from today 10-03-24 @ 11:14 and ruled out effusion, PTX, or collapse of lung     Assessment: Respiratory insufficiency, hypervolemia, hyperglycemia    Plan:   - Respiratory status required supplemental oxygen and the following of continuous pulse oximetry for support & to prevent decompensation  - Invasive hemodynamic monitoring with an A-line was required for the following of continuous MAP/BP monitoring to ensure adequate cardiovascular support and to evaluate for & help prevent decompensation    - Patient requires deresuscitation with Lasix due to perioperative fluid administration    - Addressed analgesic regimen to optimize function; Patient require IV/parenteral narcotics  - ASA and Plavix continued for graft occlusion-thromboembolism prophylaxis  - Lipitor for long term graft patency  - Lovenox continued for VTE prophylaxis in addition to Venodyne boots  - Protonix maintained for GI bleeding prophylaxis  - Lopressor, ERP Amiodarone, and Vitamin C continued for atrial fibrillation prophylaxis  - Metabolic stability & infection prophylaxis required review and adjustment of regular Insulin sliding scale, an IV Insulin drip and gylcemic regimen while following serial glucose levels to help achieve & maintain euglycemia  - Reviewed & addressed surgical site infection prophylaxis regimen    Patient required critical care management and is at risk for life threatening decompensation I provided 105 minutes of non-continuous care to the patient.  
INTERVAL HISTORY:  Sitting in a chair  Comfortable  No angina  	  MEDICATIONS:  metoprolol tartrate 25 milliGRAM(s) Oral two times a day    acetaminophen     Tablet .. 650 milliGRAM(s) Oral every 6 hours PRN  DULoxetine 30 milliGRAM(s) Oral daily  gabapentin 300 milliGRAM(s) Oral two times a day  methocarbamol 500 milliGRAM(s) Oral every 8 hours  ondansetron Injectable 4 milliGRAM(s) IV Push every 6 hours PRN  oxyCODONE    IR 5 milliGRAM(s) Oral every 4 hours PRN  oxyCODONE    IR 10 milliGRAM(s) Oral every 4 hours PRN    bisacodyl Suppository 10 milliGRAM(s) Rectal once  pantoprazole    Tablet 40 milliGRAM(s) Oral daily  polyethylene glycol 3350 17 Gram(s) Oral daily  senna 2 Tablet(s) Oral at bedtime    atorvastatin 80 milliGRAM(s) Oral at bedtime  dextrose 50% Injectable 50 milliLiter(s) IV Push every 15 minutes  ezetimibe 10 milliGRAM(s) Oral daily  glucagon  Injectable 1 milliGRAM(s) IntraMuscular once  insulin lispro (ADMELOG) corrective regimen sliding scale   SubCutaneous three times a day before meals  insulin lispro (ADMELOG) corrective regimen sliding scale   SubCutaneous at bedtime  insulin lispro Injectable (ADMELOG) 4 Unit(s) SubCutaneous three times a day before meals    ascorbic acid 500 milliGRAM(s) Oral two times a day  aspirin enteric coated 81 milliGRAM(s) Oral daily  chlorhexidine 2% Cloths 1 Application(s) Topical two times a day  chlorhexidine 4% Liquid 1 Application(s) Topical <User Schedule>  clopidogrel Tablet 75 milliGRAM(s) Oral daily  enoxaparin Injectable 40 milliGRAM(s) SubCutaneous every 24 hours  sodium chloride 0.9% lock flush 10 milliLiter(s) IV Push every 1 hour PRN  tamsulosin 0.4 milliGRAM(s) Oral two times a day        PHYSICAL EXAM:    T(C): 36.6 (10-06-24 @ 08:00), Max: 36.6 (10-05-24 @ 20:00)  HR: 68 (10-06-24 @ 12:00) (63 - 76)  BP: 110/65 (10-06-24 @ 12:00) (84/63 - 130/74)  RR: 10 (10-06-24 @ 12:00) (10 - 20)  SpO2: 98% (10-06-24 @ 12:00) (94% - 98%)  Wt(kg): --    I&O's Summary    05 Oct 2024 07:01  -  06 Oct 2024 07:00  --------------------------------------------------------  IN: 720 mL / OUT: 400 mL / NET: 320 mL    06 Oct 2024 07:01  -  06 Oct 2024 12:18  --------------------------------------------------------  IN: 600 mL / OUT: 600 mL / NET: 0 mL        Daily Height in cm: 177.8 (06 Oct 2024 00:59)    Daily Weight in k (06 Oct 2024 04:26)    Appearance: Normal	  HEENT:   Normal oral mucosa, PERRL, EOMI	  Cardiovascular: Normal S1 S2, No JVD, No murmurs, No edema  Respiratory: Diminished breath sounds bases bilaterally  Psychiatry: A & O x 3, Mood & affect appropriate  Gastrointestinal:  Soft, Non-tender, + BS	  Skin: No rashes, No ecchymoses, No cyanosis  Neurologic: Non-focal  Extremities: No edema    TELEMETRY: 	  NSR                          8.8    9.49  )-----------( 128      ( 06 Oct 2024 02:20 )             27.1     10    134[L]  |  101  |  22.5[H]  ----------------------------<  140[H]  4.1   |  21.0[L]  |  0.87    Ca    7.9[L]      06 Oct 2024 02:20  Mg     2.2     10-      proBNP:   Lipid Profile:   HgA1c:     ASSESSMENT/PLAN: 	  Pt is a  64 year old male with past medical history of HTN, HLD, type 2 diabetes mellitus, CVA (residual left side weakness), carotid disease, BPH, coronary artery disease with PCI to LCx in .  Last  cardiac catheterization revealed multivessel coronary artery disease. Pt reports shortness of breath, exertional dyspnea. Pt  denies chest pain, denies cornell  palpitation, syncope, peripheral edema and any other related issues. Pt had CABG x 3 on 10/02/24 with Dr. Figueroa   - S/p CABG x 3 (LIMA-LAD, SVG-OM, SVG-PDA) with Dr Carolina VEGA in chair, feeling better  - NSR on CM    Plan:  S/p CABG x 3 (LIMA-LAD, SVG-OM, SVG-PDA) with Dr Carolina VEGA in chair doing well  On ASA, Plavix, statins, and Beta blockers  Incentive spirometer  Ambulate  
INTERVAL HISTORY:  Sitting in a chair  Comfortable  No angna  	  MEDICATIONS:  metoprolol tartrate 25 milliGRAM(s) Oral two times a day  acetaminophen     Tablet .. 975 milliGRAM(s) Oral every 6 hours  DULoxetine 30 milliGRAM(s) Oral daily  gabapentin 300 milliGRAM(s) Oral two times a day  methocarbamol 500 milliGRAM(s) Oral every 8 hours  ondansetron Injectable 4 milliGRAM(s) IV Push every 6 hours PRN  oxyCODONE    IR 5 milliGRAM(s) Oral every 4 hours PRN  oxyCODONE    IR 10 milliGRAM(s) Oral every 4 hours PRN    bisacodyl Suppository 10 milliGRAM(s) Rectal once  pantoprazole    Tablet 40 milliGRAM(s) Oral daily  polyethylene glycol 3350 17 Gram(s) Oral daily  senna 2 Tablet(s) Oral at bedtime    atorvastatin 80 milliGRAM(s) Oral at bedtime  dextrose 50% Injectable 50 milliLiter(s) IV Push every 15 minutes  ezetimibe 10 milliGRAM(s) Oral daily  glucagon  Injectable 1 milliGRAM(s) IntraMuscular once  insulin lispro (ADMELOG) corrective regimen sliding scale   SubCutaneous three times a day before meals  insulin lispro (ADMELOG) corrective regimen sliding scale   SubCutaneous at bedtime  insulin lispro Injectable (ADMELOG) 4 Unit(s) SubCutaneous three times a day before meals    ascorbic acid 500 milliGRAM(s) Oral two times a day  aspirin enteric coated 81 milliGRAM(s) Oral daily  chlorhexidine 2% Cloths 1 Application(s) Topical two times a day  chlorhexidine 4% Liquid 1 Application(s) Topical <User Schedule>  clopidogrel Tablet 75 milliGRAM(s) Oral daily  enoxaparin Injectable 40 milliGRAM(s) SubCutaneous every 24 hours  sodium chloride 0.9% lock flush 10 milliLiter(s) IV Push every 1 hour PRN  tamsulosin 0.4 milliGRAM(s) Oral two times a day        PHYSICAL EXAM:    T(C): 36.9 (10-05-24 @ 08:00), Max: 37.6 (10-04-24 @ 22:00)  HR: 68 (10-05-24 @ 08:00) (68 - 82)  BP: 139/82 (10-05-24 @ 08:00) (104/65 - 162/91)  RR: 16 (10-05-24 @ 08:00) (14 - 23)  SpO2: 97% (10-05-24 @ 08:00) (96% - 99%)  Wt(kg): --    I&O's Summary    04 Oct 2024 07:01  -  05 Oct 2024 07:00  --------------------------------------------------------  IN: 1100 mL / OUT: 2630 mL / NET: -1530 mL        Daily     Daily     Appearance: Normal	  HEENT:   Normal oral mucosa, PERRL, EOMI	  Cardiovascular: Normal S1 S2, No JVD, No murmurs, No edema  Respiratory: Diminished bases	  Psychiatry: A & O x 3, Mood & affect appropriate  Gastrointestinal:  Soft, Non-tender, + BS	  Skin: No rashes, No ecchymoses, No cyanosis  Neurologic: Non-focal  Extremities: No edema    TELEMETRY: 	  NSR                          9.3    12.46 )-----------( 125      ( 05 Oct 2024 02:00 )             27.8     10-05    138  |  103  |  23.8[H]  ----------------------------<  166[H]  3.7   |  22.0  |  0.89    Ca    7.7[L]      05 Oct 2024 02:00  Mg     1.9     10-05      proBNP:   Lipid Profile:   HgA1c:     ASSESSMENT/PLAN: 	  Pt is a  64 year old male with past medical history of HTN, HLD, type 2 diabetes mellitus, CVA (residual left side weakness), carotid disease, BPH, coronary artery disease with PCI to LCx in 2006.  Last  cardiac catheterization revealed multivessel coronary artery disease. Pt reports shortness of breath, exertional dyspnea. Pt  denies chest pain, denies cornell  palpitation, syncope, peripheral edema and any other related issues. Pt had CABG x 3 on 10/02/24 with Dr. Figueroa   - S/p CABG x 3 (LIMA-LAD, SVG-OM, SVG-PDA) with Dr Carolina VEGA in chair, feeling better  - NSR on CM    Plan:  S/p CABG x 3 (LIMA-LAD, SVG-OM, SVG-PDA) with Dr Carolina VEGA in chair doing well  On ASA, Plavix, statins, and Beta blockers
Interval Events:  no overnight events  follow up on diabetes    patient seen and examined at bedside.  FS reviewed  labs and imaging independently reviewed  feels okay. having some pain at the anterior chest  eating and drinking well  daughter at bedside    ROS as noted above    shellfish (Rash)  Originally Entered as [Unknown] reaction to [IES] (Unknown)  No Known Drug Allergies  Originally Entered as [Unknown] reaction to [SEASONAL ALLERG] (Unknown)  shellfish (Unknown)      MEDICATIONS  (STANDING):  acetaminophen     Tablet .. 975 milliGRAM(s) Oral every 6 hours  aMIOdarone    Tablet 400 milliGRAM(s) Oral two times a day  ascorbic acid 500 milliGRAM(s) Oral two times a day  aspirin enteric coated 81 milliGRAM(s) Oral daily  atorvastatin 80 milliGRAM(s) Oral at bedtime  bisacodyl Suppository 10 milliGRAM(s) Rectal once  chlorhexidine 2% Cloths 1 Application(s) Topical two times a day  chlorhexidine 4% Liquid 1 Application(s) Topical <User Schedule>  clopidogrel Tablet 75 milliGRAM(s) Oral daily  dextrose 5%. 1000 milliLiter(s) (100 mL/Hr) IV Continuous <Continuous>  dextrose 5%. 1000 milliLiter(s) (50 mL/Hr) IV Continuous <Continuous>  dextrose 50% Injectable 50 milliLiter(s) IV Push every 15 minutes  DULoxetine 30 milliGRAM(s) Oral daily  enoxaparin Injectable 40 milliGRAM(s) SubCutaneous every 24 hours  ezetimibe 10 milliGRAM(s) Oral daily  gabapentin 100 milliGRAM(s) Oral every 8 hours  glucagon  Injectable 1 milliGRAM(s) IntraMuscular once  insulin lispro (ADMELOG) corrective regimen sliding scale   SubCutaneous three times a day before meals  insulin lispro (ADMELOG) corrective regimen sliding scale   SubCutaneous at bedtime  insulin lispro Injectable (ADMELOG) 4 Unit(s) SubCutaneous three times a day before meals  methocarbamol 500 milliGRAM(s) Oral every 8 hours  metoprolol tartrate 12.5 milliGRAM(s) Oral two times a day  pantoprazole    Tablet 40 milliGRAM(s) Oral daily  polyethylene glycol 3350 17 Gram(s) Oral daily  senna 2 Tablet(s) Oral at bedtime  sodium chloride 0.9%. 1000 milliLiter(s) (10 mL/Hr) IV Continuous <Continuous>  sodium chloride 0.9%. 1000 milliLiter(s) (5 mL/Hr) IV Continuous <Continuous>  tamsulosin 0.4 milliGRAM(s) Oral two times a day    MEDICATIONS  (PRN):  ondansetron Injectable 4 milliGRAM(s) IV Push every 6 hours PRN Nausea and/or Vomiting  oxyCODONE    IR 5 milliGRAM(s) Oral every 4 hours PRN Moderate Pain (4 - 6)  oxyCODONE    IR 10 milliGRAM(s) Oral every 4 hours PRN Severe Pain (7 - 10)  sodium chloride 0.9% lock flush 10 milliLiter(s) IV Push every 1 hour PRN Pre/post blood products, medications, blood draw, and to maintain line patency      Vital Signs Last 24 Hrs  T(C): 37.4 (04 Oct 2024 20:00), Max: 37.5 (03 Oct 2024 23:00)  T(F): 99.3 (04 Oct 2024 20:00), Max: 99.5 (03 Oct 2024 23:00)  HR: 75 (04 Oct 2024 20:00) (69 - 90)  BP: 124/81 (04 Oct 2024 20:00) (93/54 - 127/71)  BP(mean): 97 (04 Oct 2024 20:00) (65 - 97)  RR: 19 (04 Oct 2024 20:00) (12 - 20)  SpO2: 97% (04 Oct 2024 20:00) (96% - 100%)    Parameters below as of 04 Oct 2024 20:00  Patient On (Oxygen Delivery Method): room air      Weight (kg): 77.6 (10-04-24 @ 01:43)    Physical Exam:    Constitutional: NAD, well-developed  Respiratory: CTAB, normal respirations, bandaged anterior chest  Cardiovascular: S1 and S2, RRR  Gastrointestinal: BS+, soft, ntnd  Extremities: +peripheral edema  Neurological: AOx3, no focal deficits  Psychiatric: Normal mood and normal affect  Skin: no rashes, no acanthosis    LABS  10-04    136  |  104  |  20.3[H]  ----------------------------<  168[H]  4.1   |  22.0  |  0.94    Ca    7.7[L]      04 Oct 2024 02:00  Mg     2.1     10-04                            9.0    12.55 )-----------( 132      ( 04 Oct 2024 10:45 )             26.6       A1C with Estimated Average Glucose Result: 7.2 % (09-20-24 @ 15:55)  T4, Serum: 6.6 ug/dL (09-20-24 @ 15:55)  Thyroid Stimulating Hormone, Serum: 3.68 uIU/mL (09-20-24 @ 15:55)  Thyroid Stimulating Hormone, Serum: 2.59 uIU/mL (09-10-24 @ 20:10)  A1C with Estimated Average Glucose Result: 7.0 % (09-10-24 @ 20:10)            CAPILLARY BLOOD GLUCOSE      POCT Blood Glucose.: 167 mg/dL (04 Oct 2024 16:45)  POCT Blood Glucose.: 207 mg/dL (04 Oct 2024 11:15)  POCT Blood Glucose.: 191 mg/dL (04 Oct 2024 07:53)  POCT Blood Glucose.: 189 mg/dL (03 Oct 2024 21:28)  
Subjective - patient seen and evaluated bedside. Sitting comfortably in bed. Denies CP, SOB, HA, dizziness, n/v/d    Review of Systems: negative x 10 systems except as noted above    Brief summary:  64yMale POD# 5 CABG    Significant/[id number] events:  urinary retention requiring hanks catheter    PAST MEDICAL & SURGICAL HISTORY:  History of hyperlipidemia      CAD S/P percutaneous coronary angioplasty  7/10 5 stents and  one stent      Neuropathy      Type 2 diabetes mellitus      BPH (benign prostatic hyperplasia)      Hypertension      CAD (coronary artery disease)  6 stents in the past 5  and 1 in       H/O laminectomy              acetaminophen     Tablet .. 650 milliGRAM(s) Oral every 6 hours PRN  ascorbic acid 500 milliGRAM(s) Oral two times a day  aspirin enteric coated 81 milliGRAM(s) Oral daily  atorvastatin 80 milliGRAM(s) Oral at bedtime  bisacodyl Suppository 10 milliGRAM(s) Rectal once  chlorhexidine 2% Cloths 1 Application(s) Topical two times a day  chlorhexidine 4% Liquid 1 Application(s) Topical <User Schedule>  clopidogrel Tablet 75 milliGRAM(s) Oral daily  dextrose 50% Injectable 50 milliLiter(s) IV Push every 15 minutes  DULoxetine 30 milliGRAM(s) Oral daily  enoxaparin Injectable 40 milliGRAM(s) SubCutaneous every 24 hours  ezetimibe 10 milliGRAM(s) Oral daily  gabapentin 300 milliGRAM(s) Oral two times a day  glucagon  Injectable 1 milliGRAM(s) IntraMuscular once  insulin lispro (ADMELOG) corrective regimen sliding scale   SubCutaneous three times a day before meals  insulin lispro (ADMELOG) corrective regimen sliding scale   SubCutaneous at bedtime  insulin lispro Injectable (ADMELOG) 4 Unit(s) SubCutaneous three times a day before meals  lactulose Syrup 20 Gram(s) Oral every 8 hours PRN  methocarbamol 500 milliGRAM(s) Oral every 8 hours  metoprolol tartrate 25 milliGRAM(s) Oral two times a day  ondansetron Injectable 4 milliGRAM(s) IV Push every 6 hours PRN  oxyCODONE    IR 5 milliGRAM(s) Oral every 4 hours PRN  oxyCODONE    IR 10 milliGRAM(s) Oral every 4 hours PRN  pantoprazole    Tablet 40 milliGRAM(s) Oral daily  polyethylene glycol 3350 17 Gram(s) Oral daily  senna 2 Tablet(s) Oral at bedtime  sodium chloride 0.9% lock flush 10 milliLiter(s) IV Push every 1 hour PRN  tamsulosin 0.4 milliGRAM(s) Oral two times a day  MEDICATIONS  (PRN):  acetaminophen     Tablet .. 650 milliGRAM(s) Oral every 6 hours PRN Mild Pain (1 - 3)  lactulose Syrup 20 Gram(s) Oral every 8 hours PRN constipation  ondansetron Injectable 4 milliGRAM(s) IV Push every 6 hours PRN Nausea and/or Vomiting  oxyCODONE    IR 5 milliGRAM(s) Oral every 4 hours PRN Moderate Pain (4 - 6)  oxyCODONE    IR 10 milliGRAM(s) Oral every 4 hours PRN Severe Pain (7 - 10)  sodium chloride 0.9% lock flush 10 milliLiter(s) IV Push every 1 hour PRN Pre/post blood products, medications, blood draw, and to maintain line patency    Height (cm): 177.8 (10-06 @ 00:59)  Weight (kg): 77.6 (10-06 @ 00:59)  BMI (kg/m2): 24.5 (10-06 @ 00:59)  BSA (m2): 1.95 (10-06 @ 00:59)  Daily Height in cm: 177.8 (06 Oct 2024 00:59)    Daily Weight in k (06 Oct 2024 04:26)                              8.8    9.49  )-----------( 128      ( 06 Oct 2024 02:20 )             27.1   10-06    134[L]  |  101  |  22.5[H]  ----------------------------<  140[H]  4.1   |  21.0[L]  |  0.87    Ca    7.9[L]      06 Oct 2024 02:20  Mg     2.2     10-06              Objective:  T(C): 37.2 (10-07-24 @ 00:00), Max: 37.2 (10-06-24 @ 20:05)  HR: 80 (10-06-24 @ 23:00) (65 - 83)  BP: 113/70 (10-06-24 @ 17:00) (99/60 - 120/75)  RR: 19 (10-06-24 @ 23:00) (10 - 21)  SpO2: 97% (10-06-24 @ 20:00) (94% - 99%)  Wt(kg): --CAPILLARY BLOOD GLUCOSE      POCT Blood Glucose.: 208 mg/dL (06 Oct 2024 22:34)  POCT Blood Glucose.: 127 mg/dL (06 Oct 2024 16:46)  POCT Blood Glucose.: 235 mg/dL (06 Oct 2024 12:01)  POCT Blood Glucose.: 170 mg/dL (06 Oct 2024 09:13)  POCT Blood Glucose.: 143 mg/dL (06 Oct 2024 07:38)  I&O's Summary    05 Oct 2024 07:01  -  06 Oct 2024 07:00  --------------------------------------------------------  IN: 720 mL / OUT: 400 mL / NET: 320 mL    06 Oct 2024 07:01  -  07 Oct 2024 00:49  --------------------------------------------------------  IN: 1270 mL / OUT: 900 mL / NET: 370 mL        Physical Exam  General: NAD  Neuro: A+O x 3, non-focal, speech clear and intact  Psych: Appropriate affect  HEENT:  NCAT, No conjuctival edema or icterus, no thrush.  Pulm: CTA, equal bilaterally  CV: RRR, +S1S2  Abd: soft, NT, ND, +BS  Ext: +DP Pulses b/l, no edema  Skin: Warm, dry, intact  Inc: MSI C/D/I/stable w/ dressing, LE vein harvest site C/D/I           Imaging:  < from: Xray Chest 1 View- PORTABLE-Routine (10.05.24 @ 06:07) >    Findings/  impression:    Support devices: None.    Cardiac/mediastinum/hilum: No significant change    Skeleton/soft tissues: No significant change.    Lung parenchyma/Pleura: Low lung volume. Bibasilar opacities/effusions.   No evidence of pneumothorax.    --- End of Report ---      < end of copied text >                
INTERVAL HISTORY:  POD #2 CABG x 3  OOB in chair eating  Feeling better  Denies chest pain and SOB  	  MEDICATIONS:  aMIOdarone    Tablet 400 milliGRAM(s) Oral two times a day  metoprolol tartrate 12.5 milliGRAM(s) Oral two times a day  acetaminophen     Tablet .. 975 milliGRAM(s) Oral every 6 hours  DULoxetine 30 milliGRAM(s) Oral daily  gabapentin 100 milliGRAM(s) Oral every 8 hours  methocarbamol 500 milliGRAM(s) Oral every 8 hours  ondansetron Injectable 4 milliGRAM(s) IV Push every 6 hours PRN  oxyCODONE    IR 5 milliGRAM(s) Oral every 4 hours PRN  oxyCODONE    IR 10 milliGRAM(s) Oral every 4 hours PRN  bisacodyl Suppository 10 milliGRAM(s) Rectal once  pantoprazole    Tablet 40 milliGRAM(s) Oral daily  polyethylene glycol 3350 17 Gram(s) Oral daily  senna 2 Tablet(s) Oral at bedtime  atorvastatin 80 milliGRAM(s) Oral at bedtime  dextrose 50% Injectable 50 milliLiter(s) IV Push every 15 minutes  ezetimibe 10 milliGRAM(s) Oral daily  glucagon  Injectable 1 milliGRAM(s) IntraMuscular once  insulin lispro (ADMELOG) corrective regimen sliding scale   SubCutaneous three times a day before meals  insulin lispro (ADMELOG) corrective regimen sliding scale   SubCutaneous at bedtime  insulin lispro Injectable (ADMELOG) 4 Unit(s) SubCutaneous three times a day before meals  ascorbic acid 500 milliGRAM(s) Oral two times a day  aspirin enteric coated 81 milliGRAM(s) Oral daily  chlorhexidine 2% Cloths 1 Application(s) Topical two times a day  chlorhexidine 4% Liquid 1 Application(s) Topical <User Schedule>  clopidogrel Tablet 75 milliGRAM(s) Oral daily  dextrose 5%. 1000 milliLiter(s) IV Continuous <Continuous>  dextrose 5%. 1000 milliLiter(s) IV Continuous <Continuous>  enoxaparin Injectable 40 milliGRAM(s) SubCutaneous every 24 hours  sodium chloride 0.9% lock flush 10 milliLiter(s) IV Push every 1 hour PRN  sodium chloride 0.9%. 1000 milliLiter(s) IV Continuous <Continuous>  sodium chloride 0.9%. 1000 milliLiter(s) IV Continuous <Continuous>  tamsulosin 0.4 milliGRAM(s) Oral two times a day        PHYSICAL EXAM:    T(C): 37.3 (10-04-24 @ 11:00), Max: 37.5 (10-03-24 @ 18:00)  HR: 71 (10-04-24 @ 13:00) (71 - 90)  BP: 107/64 (10-04-24 @ 13:00) (93/54 - 116/64)  RR: 15 (10-04-24 @ 13:00) (12 - 20)  SpO2: 98% (10-04-24 @ 13:00) (96% - 100%)  Wt(kg): --    I&O's Summary    03 Oct 2024 07:01  -  04 Oct 2024 07:00  --------------------------------------------------------  IN: 2296 mL / OUT: 1355 mL / NET: 941 mL    04 Oct 2024 07:01  -  04 Oct 2024 13:49  --------------------------------------------------------  IN: 260 mL / OUT: 190 mL / NET: 70 mL        Daily Height in cm: 177.8 (04 Oct 2024 01:43)    Daily Weight in k.2 (04 Oct 2024 04:21)    Appearance: Normal	  HEENT:   Normal oral mucosa, PERRL, EOMI	  Cardiovascular: Normal S1 S2, No JVD, No murmurs, No edema  Respiratory: Lungs clear to auscultation	  Psychiatry: A & O x 3, Mood & affect appropriate  Gastrointestinal:  Soft, Non-tender, + BS	  Skin: No rashes, No ecchymoses, No cyanosis  Neurologic: Non-focal  Extremities: Normal range of motion, No clubbing, cyanosis or edema  Vascular: Peripheral pulses palpable 2+ bilaterally  Procedure Site: Mid sternal dsg C/D/I      TELEMETRY: NSR	        LABS:	 	    CARDIAC MARKERS:                            9.0    12.55 )-----------( 132      ( 04 Oct 2024 10:45 )             26.6     10-04    136  |  104  |  20.3[H]  ----------------------------<  168[H]  4.1   |  22.0  |  0.94    Ca    7.7[L]      04 Oct 2024 02:00  Mg     2.1     10-04    TPro  4.7[L]  /  Alb  3.0[L]  /  TBili  0.6  /  DBili  x   /  AST  33  /  ALT  18  /  AlkPhos  38[L]  10-02  :     ASSESSMENT/PLAN: 	  Pt is a  64 year old male with past medical history of HTN, HLD, type 2 diabetes mellitus, CVA (residual left side weakness), carotid disease, BPH, coronary artery disease with PCI to LCx in .  Last  cardiac catheterization revealed multivessel coronary artery disease. Pt reports shortness of breath, exertional dyspnea. Pt  denies chest pain, denies cornell  palpitation, syncope, peripheral edema and any other related issues. Pt scheduled for CABG x 3 on 10/02/24 with Dr. Figueroa  (20 Sep 2024 15:36)  - S/p CABG x 3 (LIMA-LAD, SVG-OM, SVG-PDA) with Dr Carolina VEGA in chair, feeling better  - NSR on     Plan:  S/p CABG x 3 (LIMA-LAD, SVG-OM, SVG-PDA) with Dr Carolina VEGA in chair doing well  Plan as per CT Surgery        
LAURIE SIMON  MRN#: [id number]  Subjective: The patient was in the CTICU in critical condition at risk for imminent decompensation and was seen and evaluate on AM rounds with the entire multidisciplinary team.     OBJECTIVE:  ICU Vital Signs Last 24 Hrs  T(C): 36.9 (05 Oct 2024 08:00), Max: 37.6 (04 Oct 2024 22:00)  T(F): 98.4 (05 Oct 2024 08:00), Max: 99.7 (04 Oct 2024 22:00)  HR: 68 (05 Oct 2024 08:00) (68 - 82)  BP: 139/82 (05 Oct 2024 08:00) (104/65 - 162/91)  BP(mean): 99 (05 Oct 2024 08:00) (77 - 112)  ABP: 117/68 (04 Oct 2024 15:00) (88/57 - 135/57)  ABP(mean): 88 (04 Oct 2024 15:00) (65 - 88)  RR: 16 (05 Oct 2024 08:00) (14 - 23)  SpO2: 97% (05 Oct 2024 08:00) (96% - 99%)    O2 Parameters below as of 05 Oct 2024 08:00  Patient On (Oxygen Delivery Method): room air    I&O's Summary    04 Oct 2024 07:01  -  05 Oct 2024 07:00  --------------------------------------------------------  IN: 1100 mL / OUT: 2630 mL / NET: -1530 mL    PHYSICAL EXAM:Daily Height in cm: 177.8 (03 Oct 2024 03:04)    Daily Weight in k.7 (03 Oct 2024 04:09)  General: WN/WD NAD    HEENT:     + NCAT  + EOMI  - Conjuctival edema   - Icterus   - Thrush   - ETT  - NGT/OGT  Neck:         + FROM    + JVD     - Nodes     - Masses    + Mid-line trachea   - Tracheostomy  Chest:         - Sternal click  - Sternal drainage +  Pacing wires  + Chest tubes  - SubQ emphysema  Lungs:          + CTA   - Rhonchi    - Rales    - Wheezing     - Decreased BS   - Dullness R L  Cardiac:       + S1 + S2    + RRR   - Irregular   - S3  - S4    - Murmurs   - Rub   - Hamman’s sign   Abdomen:    + BS     + Soft    + Non-tender     - Distended    - Organomegaly  - PEG  Extremities:   - Cyanosis U/L   - Clubbing  U/L  - LE/UE Edema   + Capillary refill    + Pulses   Neuro:        + Awake   +  Alert   - Confused   - Lethargic   - Sedated   - Generalized Weakness  Skin:        - Rashes    - Erythema   + Normal incisions   + IV sites intact  - Sacral decubitus    MEDICATIONS  (STANDING):  acetaminophen     Tablet .. 975 milliGRAM(s) Oral every 6 hours  ascorbic acid 500 milliGRAM(s) Oral two times a day  aspirin enteric coated 81 milliGRAM(s) Oral daily  atorvastatin 80 milliGRAM(s) Oral at bedtime  bisacodyl Suppository 10 milliGRAM(s) Rectal once  chlorhexidine 2% Cloths 1 Application(s) Topical two times a day  chlorhexidine 4% Liquid 1 Application(s) Topical <User Schedule>  clopidogrel Tablet 75 milliGRAM(s) Oral daily  dextrose 50% Injectable 50 milliLiter(s) IV Push every 15 minutes  DULoxetine 30 milliGRAM(s) Oral daily  enoxaparin Injectable 40 milliGRAM(s) SubCutaneous every 24 hours  ezetimibe 10 milliGRAM(s) Oral daily  gabapentin 300 milliGRAM(s) Oral two times a day  glucagon  Injectable 1 milliGRAM(s) IntraMuscular once  insulin lispro (ADMELOG) corrective regimen sliding scale   SubCutaneous three times a day before meals  insulin lispro (ADMELOG) corrective regimen sliding scale   SubCutaneous at bedtime  insulin lispro Injectable (ADMELOG) 4 Unit(s) SubCutaneous three times a day before meals  methocarbamol 500 milliGRAM(s) Oral every 8 hours  metoprolol tartrate 25 milliGRAM(s) Oral two times a day  pantoprazole    Tablet 40 milliGRAM(s) Oral daily  polyethylene glycol 3350 17 Gram(s) Oral daily  senna 2 Tablet(s) Oral at bedtime  tamsulosin 0.4 milliGRAM(s) Oral two times a day    MEDICATIONS  (PRN):  ondansetron Injectable 4 milliGRAM(s) IV Push every 6 hours PRN Nausea and/or Vomiting  oxyCODONE    IR 5 milliGRAM(s) Oral every 4 hours PRN Moderate Pain (4 - 6)  oxyCODONE    IR 10 milliGRAM(s) Oral every 4 hours PRN Severe Pain (7 - 10)  sodium chloride 0.9% lock flush 10 milliLiter(s) IV Push every 1 hour PRN Pre/post blood products, medications, blood draw, and to maintain line patency      LABS: All Lab data reviewed and analyzed                                   9.3    12.46 )-----------( 125      ( 05 Oct 2024 02:00 )             27.8   10-05    138  |  103  |  23.8[H]  ----------------------------<  166[H]  3.7   |  22.0  |  0.89    Ca    7.7[L]      05 Oct 2024 02:00  Mg     1.9     10-05    I independently reviewed CXR overnight from today 10-05-24 and ruled out effusion, PTX, or collapse of lung     Assessment: Respiratory insufficiency, hypervolemia, hyperglycemia    Plan:   - Respiratory status required supplemental oxygen and the following of continuous pulse oximetry for support & to prevent decompensation  - Invasive hemodynamic monitoring with an A-line was required for the following of continuous MAP/BP monitoring to ensure adequate cardiovascular support and to evaluate for & help prevent decompensation    - Patient requires deresuscitation with Lasix due to perioperative fluid administration    - Addressed analgesic regimen to optimize function; Patient require IV/parenteral narcotics  - ASA and Plavix continued for graft occlusion-thromboembolism prophylaxis  - Lipitor for long term graft patency  - Lovenox continued for VTE prophylaxis in addition to Venodyne boots  - Protonix maintained for GI bleeding prophylaxis  - Lopressor, ERP Amiodarone, and Vitamin C continued for atrial fibrillation prophylaxis  - Metabolic stability & infection prophylaxis required review and adjustment of regular Insulin sliding scale and gylcemic regimen while following serial glucose levels to help achieve & maintain euglycemia    Patient required critical care management and is at risk for life threatening decompensation I provided 105 minutes of non-continuous care to the patient.  
LAURIE SIMON  MRN-[id number]    HPI:  Pt is a  64 year old male referred by Dr. Hagan for Coronary artery bypass graft x3 for CAD. Pt  past medical history includes HTN, HLD, type 2 diabetes mellitus, CVA (residual left side weakness), carotid disease, BPH, coronary artery disease with PCI to LCx in 2006.  Last  cardiac catheterization revealed multivessel coronary artery disease. Pt reports shortness of breath, exertional dyspnea. Pt  denies chest pain, denies cornell  palpitation, syncope, peripheral edema and any other related issues. Pt scheduled for this surgery on 10/01/24 with Dr. Figueroa  (20 Sep 2024 15:36)      Surgery/Hospital Course:    CABG, with SHALA 02-Oct-2024   CABG x3 (LIMA-LAD, SVG-OM, SVG-PDA) endarectomy PDA & OM       Open coronary endarterectomy : OM, PDA     Today:  No acute events     ICU Vital Signs Last 24 Hrs  T(C): 37.2 (07 Oct 2024 00:00), Max: 37.2 (06 Oct 2024 20:05)  T(F): 99 (07 Oct 2024 00:00), Max: 99 (07 Oct 2024 00:00)  HR: 66 (07 Oct 2024 11:00) (64 - 88)  BP: 103/58 (07 Oct 2024 11:00) (102/64 - 131/71)  BP(mean): 71 (07 Oct 2024 11:00) (71 - 239)  ABP: --  ABP(mean): --  RR: 17 (07 Oct 2024 11:00) (12 - 22)  SpO2: 98% (07 Oct 2024 11:00) (95% - 99%)    O2 Parameters below as of 07 Oct 2024 12:00  Patient On (Oxygen Delivery Method): room air            Physical Exam:  Gen: A&O   CNS: non focal 	  Neck: no JVD  RES : clear , no wheezing              CVS: Regular  rhythm. Normal S1/S2  Abd: Soft, non-distended. Bowel sounds present.  Skin: No rash.  Ext:  no edema    ============================I/O===========================   I&O's Detail    06 Oct 2024 07:01  -  07 Oct 2024 07:00  --------------------------------------------------------  IN:    Oral Fluid: 1395 mL  Total IN: 1395 mL    OUT:    Indwelling Catheter - Urethral (mL): 910 mL    Intermittent Catheterization - Urethral (mL): 600 mL    Voided (mL): 450 mL  Total OUT: 1960 mL    Total NET: -565 mL      07 Oct 2024 07:01  -  07 Oct 2024 12:19  --------------------------------------------------------  IN:    Oral Fluid: 400 mL  Total IN: 400 mL    OUT:    Indwelling Catheter - Urethral (mL): 650 mL  Total OUT: 650 mL    Total NET: -250 mL        ============================ LABS =========================                        10.5   9.40  )-----------( 183      ( 07 Oct 2024 06:14 )             32.6     10-07    137  |  102  |  18.9  ----------------------------<  171[H]  4.2   |  24.0  |  0.90    Ca    8.2[L]      07 Oct 2024 06:14  Mg     2.0     10-07            Urinalysis Basic - ( 07 Oct 2024 06:14 )    Color: x / Appearance: x / SG: x / pH: x  Gluc: 171 mg/dL / Ketone: x  / Bili: x / Urobili: x   Blood: x / Protein: x / Nitrite: x   Leuk Esterase: x / RBC: x / WBC x   Sq Epi: x / Non Sq Epi: x / Bacteria: x      ======================Micro/Rad/Cardio=================  Culture: Reviewed   CXR: Reviewed  Echo:Reviewed  ======================================================  PAST MEDICAL & SURGICAL HISTORY:  Hypertension      History of hyperlipidemia      CAD S/P percutaneous coronary angioplasty  7/10 5 stents and 7/14 one stent      Neuropathy      Type 2 diabetes mellitus      BPH (benign prostatic hyperplasia)      CAD (coronary artery disease)  6 stents in the past 5 2010 and 1 in 2014      H/O laminectomy  2015        ====================ASSESSMENT ==============   64 year old male referred PMH  HTN, HLD, type 2 diabetes mellitus, CVA (residual left side weakness), carotid disease, BPH, coronary artery disease with PCI to LCx in 2006.  Last  cardiac catheterization revealed multivessel coronary artery disease. Underwent CABG 10/01/24 with Dr. Figueroa . postop high chest tube output resolved    CAD (coronary artery disease).   Hypertension.   Diabetes mellitus.   Urinary retention.   Post op Hypovolemia  Post op respiratory insufficiency       Plan:  Beta blocker as tolerated by HR and SBP   Lipitor for chronic graft patency prophylaxis  Aspirin and Plavix for acute graft closure prophylaxis  Chest PT and IS use with bedside nurse.  SCDs, Lovenox for DVT prophylaxis  Protonix for GI prophylaxis  continue bowel regimen  Continue flomax bid  urology consult    ====================== NEUROLOGY=====================  acetaminophen     Tablet .. 650 milliGRAM(s) Oral every 6 hours PRN Mild Pain (1 - 3)  DULoxetine 30 milliGRAM(s) Oral daily  gabapentin 300 milliGRAM(s) Oral two times a day  methocarbamol 500 milliGRAM(s) Oral every 8 hours  ondansetron Injectable 4 milliGRAM(s) IV Push every 6 hours PRN Nausea and/or Vomiting  oxyCODONE    IR 5 milliGRAM(s) Oral every 4 hours PRN Moderate Pain (4 - 6)  oxyCODONE    IR 10 milliGRAM(s) Oral every 4 hours PRN Severe Pain (7 - 10)    ==================== RESPIRATORY======================  Post op respiratory insufficiency  ====================CARDIOVASCULAR==================  Post op Hypovolemia  metoprolol tartrate 25 milliGRAM(s) Oral two times a day    ===================HEMATOLOGIC/ONC ===================  Monitor H&H/Plts    aspirin enteric coated 81 milliGRAM(s) Oral daily  clopidogrel Tablet 75 milliGRAM(s) Oral daily  enoxaparin Injectable 40 milliGRAM(s) SubCutaneous every 24 hours    ===================== RENAL =========================  Continue monitoring urine output, I&OS, BUN/Cr   tamsulosin 0.4 milliGRAM(s) Oral two times a day    ==================== GASTROINTESTINAL===================  ascorbic acid 500 milliGRAM(s) Oral two times a day  bisacodyl Suppository 10 milliGRAM(s) Rectal once  lactulose Syrup 20 Gram(s) Oral every 8 hours PRN constipation  pantoprazole    Tablet 40 milliGRAM(s) Oral daily  polyethylene glycol 3350 17 Gram(s) Oral daily  senna 2 Tablet(s) Oral at bedtime  sodium chloride 0.9% lock flush 10 milliLiter(s) IV Push every 1 hour PRN Pre/post blood products, medications, blood draw, and to maintain line patency    =======================    ENDOCRINE  =====================  atorvastatin 80 milliGRAM(s) Oral at bedtime  dextrose 50% Injectable 50 milliLiter(s) IV Push every 15 minutes  ezetimibe 10 milliGRAM(s) Oral daily  glucagon  Injectable 1 milliGRAM(s) IntraMuscular once  insulin lispro (ADMELOG) corrective regimen sliding scale   SubCutaneous three times a day before meals  insulin lispro (ADMELOG) corrective regimen sliding scale   SubCutaneous at bedtime  insulin lispro Injectable (ADMELOG) 4 Unit(s) SubCutaneous three times a day before meals    ========================INFECTIOUS DISEASE================      -Monitor Neurologic status ,   -Head of the bed should remain elevated to 45 degrees,  -Monitor for arrhythmias and monitor parameters for organ perfusion,  -Glycemic control is satisfactory,  -Nutritional goals will be met using po eventually , insure adequate caloric intake and monitor the same ,  -Electrolytes have been repleted as necessary , pain control has been achieved  and wound care has been carried out ,  -Stress ulcer and VTE prophylaxis will be achieved,  -Agressive PT and early mobility and ambulation goals will be met,  I have spent 35 minutes providing acute care for this critically ill patient     Patient requires continuous monitoring with bedside rhythm monitoring, pulse ox monitoring, and intermittent blood gas analysis. Care plan discussed with ICU care team. Patient remained critical and at risk for life threatening decompensation.           
Patient denies chest pain or dyspnea     TELEMETRY: sr    MEDICATIONS  (STANDING):  acetaminophen     Tablet .. 975 milliGRAM(s) Oral every 6 hours  aMIOdarone    Tablet 400 milliGRAM(s) Oral two times a day  ascorbic acid 500 milliGRAM(s) Oral two times a day  aspirin enteric coated 81 milliGRAM(s) Oral daily  atorvastatin 80 milliGRAM(s) Oral at bedtime  cefuroxime  IVPB 1500 milliGRAM(s) IV Intermittent every 8 hours  chlorhexidine 2% Cloths 1 Application(s) Topical two times a day  clopidogrel Tablet 75 milliGRAM(s) Oral daily  dextrose 5%. 1000 milliLiter(s) (100 mL/Hr) IV Continuous <Continuous>  dextrose 5%. 1000 milliLiter(s) (50 mL/Hr) IV Continuous <Continuous>  dextrose 50% Injectable 50 milliLiter(s) IV Push every 15 minutes  DULoxetine 30 milliGRAM(s) Oral daily  ezetimibe 10 milliGRAM(s) Oral daily  gabapentin 100 milliGRAM(s) Oral every 8 hours  glucagon  Injectable 1 milliGRAM(s) IntraMuscular once  insulin lispro Injectable (ADMELOG) 4 Unit(s) SubCutaneous three times a day before meals  insulin regular Infusion 2 Unit(s)/Hr (2 mL/Hr) IV Continuous <Continuous>  ketorolac   Injectable 15 milliGRAM(s) IV Push every 8 hours  methocarbamol 500 milliGRAM(s) Oral every 8 hours  metoprolol tartrate 12.5 milliGRAM(s) Oral two times a day  pantoprazole    Tablet 40 milliGRAM(s) Oral daily  polyethylene glycol 3350 17 Gram(s) Oral daily  senna 2 Tablet(s) Oral at bedtime  sodium chloride 0.9%. 1000 milliLiter(s) (5 mL/Hr) IV Continuous <Continuous>  sodium chloride 0.9%. 1000 milliLiter(s) (10 mL/Hr) IV Continuous <Continuous>  tamsulosin 0.4 milliGRAM(s) Oral two times a day  vancomycin  IVPB 1000 milliGRAM(s) IV Intermittent every 12 hours    MEDICATIONS  (PRN):  ondansetron Injectable 4 milliGRAM(s) IV Push every 6 hours PRN Nausea and/or Vomiting  oxyCODONE    IR 5 milliGRAM(s) Oral every 4 hours PRN Moderate Pain (4 - 6)  oxyCODONE    IR 10 milliGRAM(s) Oral every 4 hours PRN Severe Pain (7 - 10)        Vital Signs Last 24 Hrs  T(C): 37.1 (03 Oct 2024 13:00), Max: 37.6 (02 Oct 2024 22:15)  T(F): 98.8 (03 Oct 2024 13:00), Max: 99.7 (02 Oct 2024 22:15)  HR: 73 (03 Oct 2024 13:00) (69 - 88)  BP: 109/65 (03 Oct 2024 13:00) (95/60 - 115/64)  BP(mean): 80 (03 Oct 2024 13:00) (71 - 80)  RR: 20 (03 Oct 2024 13:00) (0 - 25)  SpO2: 100% (03 Oct 2024 13:00) (100% - 100%)    Parameters below as of 03 Oct 2024 12:00  Patient On (Oxygen Delivery Method): nasal cannula  O2 Flow (L/min): 2      Daily Height in cm: 177.8 (03 Oct 2024 03:04)    Daily Weight in k.7 (03 Oct 2024 04:09)    I&O's Detail    02 Oct 2024 07:01  -  03 Oct 2024 07:00  --------------------------------------------------------  IN:    Cryoprecipitate: 126 mL    Insulin: 54 mL    IV PiggyBack: 50 mL    IV PiggyBack: 250 mL    IV PiggyBack: 100 mL    IV PiggyBack: 100 mL    Lactated Ringers Bolus: 500 mL    Norepinephrine: 59.9 mL    Oral Fluid: 360 mL    PRBCs (Packed Red Blood Cells): 317 mL    Propofol: 56.1 mL    sodium chloride 0.9%: 150 mL    sodium chloride 0.9%: 75 mL  Total IN: 2198 mL    OUT:    Chest Tube (mL): 350 mL    Chest Tube (mL): 930 mL    Voided (mL): 1435 mL  Total OUT: 2715 mL    Total NET: -517 mL      03 Oct 2024 07:01  -  03 Oct 2024 15:15  --------------------------------------------------------  IN:    Insulin: 24 mL    Oral Fluid: 840 mL    sodium chloride 0.9%: 35 mL    sodium chloride 0.9%: 70 mL  Total IN: 969 mL    OUT:    Chest Tube (mL): 90 mL    Chest Tube (mL): 10 mL    Indwelling Catheter - Urethral (mL): 320 mL  Total OUT: 420 mL    Total NET: 549 mL          PHYSICAL EXAM:  Appearance: In NAD  Cardiovascular: Normal S1 S2  Respiratory: Lungs clear to auscultation	  Chest wound dressed, B CT   Gastrointestinal:  Soft, Non-tender, + BS, no bruits	  Neurologic: Grossly non-focal.  Extremities: No edema    LABS:                        10.0   14.92 )-----------( 245      ( 03 Oct 2024 02:10 )             29.7     10-03    141  |  110[H]  |  13.8  ----------------------------<  120[H]  4.2   |  21.0[L]  |  0.80    Ca    8.0[L]      03 Oct 2024 02:15  Mg     2.5     10-03    TPro  4.7[L]  /  Alb  3.0[L]  /  TBili  0.6  /  DBili  x   /  AST  33  /  ALT  18  /  AlkPhos  38[L]  10-02    CARDIAC MARKERS ( 03 Oct 2024 02:15 )  x     / x     / x     / x     / 21.7 ng/mL  CARDIAC MARKERS ( 02 Oct 2024 17:40 )  x     / x     / x     / x     / 25.2 ng/mL      PT/INR - ( 03 Oct 2024 02:15 )   PT: 12.1 sec;   INR: 1.04 ratio         PTT - ( 03 Oct 2024 02:15 )  PTT:25.8 sec  Urinalysis Basic - ( 03 Oct 2024 02:15 )    Color: x / Appearance: x / SG: x / pH: x  Gluc: 120 mg/dL / Ketone: x  / Bili: x / Urobili: x   Blood: x / Protein: x / Nitrite: x   Leuk Esterase: x / RBC: x / WBC x   Sq Epi: x / Non Sq Epi: x / Bacteria: x      I&O's Summary    02 Oct 2024 07:01  -  03 Oct 2024 07:00  --------------------------------------------------------  IN: 2198 mL / OUT: 2715 mL / NET: -517 mL    03 Oct 2024 07:01  -  03 Oct 2024 15:15  --------------------------------------------------------  IN: 969 mL / OUT: 420 mL / NET: 549 mL     
Subjective - patient seen and evaluated bedside. Sitting comfortably in bed. Denies CP, SOB, HA, dizziness, n/v/d    Review of Systems: negative x 10 systems except as noted above    Brief summary:  64yMale POD# 4 CABGx3, endarterectomy     Significant/[id number] events:  no acute events, deintensified    PAST MEDICAL & SURGICAL HISTORY:  History of hyperlipidemia      CAD S/P percutaneous coronary angioplasty  7/10 5 stents and 7/14 one stent      Neuropathy      Type 2 diabetes mellitus      BPH (benign prostatic hyperplasia)      Hypertension      CAD (coronary artery disease)  6 stents in the past 5 2010 and 1 in 2014      H/O laminectomy  2015            acetaminophen     Tablet .. 650 milliGRAM(s) Oral every 6 hours PRN  ascorbic acid 500 milliGRAM(s) Oral two times a day  aspirin enteric coated 81 milliGRAM(s) Oral daily  atorvastatin 80 milliGRAM(s) Oral at bedtime  bisacodyl Suppository 10 milliGRAM(s) Rectal once  chlorhexidine 2% Cloths 1 Application(s) Topical two times a day  chlorhexidine 4% Liquid 1 Application(s) Topical <User Schedule>  clopidogrel Tablet 75 milliGRAM(s) Oral daily  dextrose 50% Injectable 50 milliLiter(s) IV Push every 15 minutes  DULoxetine 30 milliGRAM(s) Oral daily  enoxaparin Injectable 40 milliGRAM(s) SubCutaneous every 24 hours  ezetimibe 10 milliGRAM(s) Oral daily  gabapentin 300 milliGRAM(s) Oral two times a day  glucagon  Injectable 1 milliGRAM(s) IntraMuscular once  insulin lispro (ADMELOG) corrective regimen sliding scale   SubCutaneous three times a day before meals  insulin lispro (ADMELOG) corrective regimen sliding scale   SubCutaneous at bedtime  insulin lispro Injectable (ADMELOG) 4 Unit(s) SubCutaneous three times a day before meals  methocarbamol 500 milliGRAM(s) Oral every 8 hours  metoprolol tartrate 25 milliGRAM(s) Oral two times a day  ondansetron Injectable 4 milliGRAM(s) IV Push every 6 hours PRN  oxyCODONE    IR 5 milliGRAM(s) Oral every 4 hours PRN  oxyCODONE    IR 10 milliGRAM(s) Oral every 4 hours PRN  pantoprazole    Tablet 40 milliGRAM(s) Oral daily  polyethylene glycol 3350 17 Gram(s) Oral daily  senna 2 Tablet(s) Oral at bedtime  sodium chloride 0.9% lock flush 10 milliLiter(s) IV Push every 1 hour PRN  tamsulosin 0.4 milliGRAM(s) Oral two times a day  MEDICATIONS  (PRN):  acetaminophen     Tablet .. 650 milliGRAM(s) Oral every 6 hours PRN Mild Pain (1 - 3)  ondansetron Injectable 4 milliGRAM(s) IV Push every 6 hours PRN Nausea and/or Vomiting  oxyCODONE    IR 5 milliGRAM(s) Oral every 4 hours PRN Moderate Pain (4 - 6)  oxyCODONE    IR 10 milliGRAM(s) Oral every 4 hours PRN Severe Pain (7 - 10)  sodium chloride 0.9% lock flush 10 milliLiter(s) IV Push every 1 hour PRN Pre/post blood products, medications, blood draw, and to maintain line patency      Daily     Daily                               9.3    12.46 )-----------( 125      ( 05 Oct 2024 02:00 )             27.8   10-05    138  |  103  |  23.8[H]  ----------------------------<  166[H]  3.7   |  22.0  |  0.89    Ca    7.7[L]      05 Oct 2024 02:00  Mg     1.9     10-05              Objective:  T(C): 36.6 (10-06-24 @ 00:00), Max: 37.2 (10-05-24 @ 01:00)  HR: 67 (10-06-24 @ 00:00) (63 - 82)  BP: 116/70 (10-06-24 @ 00:00) (84/63 - 162/91)  RR: 12 (10-06-24 @ 00:00) (12 - 21)  SpO2: 97% (10-06-24 @ 00:00) (96% - 98%)  Wt(kg): --CAPILLARY BLOOD GLUCOSE      POCT Blood Glucose.: 167 mg/dL (05 Oct 2024 21:13)  POCT Blood Glucose.: 174 mg/dL (05 Oct 2024 16:28)  POCT Blood Glucose.: 200 mg/dL (05 Oct 2024 12:18)  POCT Blood Glucose.: 181 mg/dL (05 Oct 2024 07:54)  I&O's Summary    04 Oct 2024 07:01  -  05 Oct 2024 07:00  --------------------------------------------------------  IN: 1100 mL / OUT: 2630 mL / NET: -1530 mL    05 Oct 2024 07:01  -  06 Oct 2024 00:59  --------------------------------------------------------  IN: 720 mL / OUT: 100 mL / NET: 620 mL        Physical Exam  General: NAD  Neuro: A+O x 3, non-focal, speech clear and intact  Psych: Appropriate affect  HEENT:  NCAT, No conjuctival edema or icterus, no thrush.  Pulm: CTA, equal bilaterally  CV: RRR, +S1S2  Abd: soft, NT, ND, +BS  Ext: +DP Pulses b/l, no edema  Skin: Warm, dry, intact  Inc: MSI C/D/I/stable w/ dressing, LE vein harvest site C/D/I           Imaging:  < from: Xray Chest 1 View- PORTABLE-Routine (10.03.24 @ 06:05) >    Visualized osseous structures are intact.    IMPRESSION:  Status post cardiac surgery.  Suspect LEFT lower lower zone retrocardiac airspace disease obscuring   diaphragm contour..  Catheters and/or tubes in place    < end of copied text >                
Subjective - patient seen and evaluated bedside. Sitting comfortably in bed. Denies CP, SOB, HA, dizziness, n/v/d    Review of Systems: negative x 10 systems except as noted above    Brief summary:  64yMale POD# 1 CABGx3, endarterectomy    Significant/[id number] events: as above. postop bleeding now resolved      PAST MEDICAL & SURGICAL HISTORY:  Hypertension      History of hyperlipidemia      CAD S/P percutaneous coronary angioplasty  7/10 5 stents and 7/14 one stent      Neuropathy      Type 2 diabetes mellitus      BPH (benign prostatic hyperplasia)      CAD (coronary artery disease)  6 stents in the past 5 2010 and 1 in 2014      H/O laminectomy  2015            acetaminophen     Tablet .. 975 milliGRAM(s) Oral every 6 hours  aMIOdarone    Tablet 400 milliGRAM(s) Oral two times a day  ascorbic acid 500 milliGRAM(s) Oral two times a day  aspirin enteric coated 81 milliGRAM(s) Oral daily  atorvastatin 80 milliGRAM(s) Oral at bedtime  cefuroxime  IVPB 1500 milliGRAM(s) IV Intermittent every 8 hours  chlorhexidine 0.12% Liquid 15 milliLiter(s) Oral Mucosa every 12 hours  chlorhexidine 2% Cloths 1 Application(s) Topical two times a day  dextrose 50% Injectable 50 milliLiter(s) IV Push every 15 minutes  dextrose 50% Injectable 25 milliLiter(s) IV Push every 15 minutes  gabapentin 100 milliGRAM(s) Oral every 8 hours  insulin regular Infusion 2 Unit(s)/Hr IV Continuous <Continuous>  norepinephrine Infusion 0.05 MICROgram(s)/kG/Min IV Continuous <Continuous>  ondansetron Injectable 4 milliGRAM(s) IV Push every 6 hours PRN  oxyCODONE    IR 5 milliGRAM(s) Oral every 4 hours PRN  oxyCODONE    IR 10 milliGRAM(s) Oral every 4 hours PRN  pantoprazole    Tablet 40 milliGRAM(s) Oral daily  polyethylene glycol 3350 17 Gram(s) Oral daily  potassium chloride  10 mEq/50 mL IVPB 10 milliEquivalent(s) IV Intermittent every 1 hour  potassium chloride  10 mEq/50 mL IVPB 10 milliEquivalent(s) IV Intermittent every 1 hour  potassium chloride  10 mEq/50 mL IVPB 10 milliEquivalent(s) IV Intermittent every 1 hour  propofol Infusion 10 MICROgram(s)/kG/Min IV Continuous <Continuous>  senna 2 Tablet(s) Oral at bedtime  sodium chloride 0.9%. 1000 milliLiter(s) IV Continuous <Continuous>  sodium chloride 0.9%. 1000 milliLiter(s) IV Continuous <Continuous>  vancomycin  IVPB 1000 milliGRAM(s) IV Intermittent every 12 hours  MEDICATIONS  (PRN):  ondansetron Injectable 4 milliGRAM(s) IV Push every 6 hours PRN Nausea and/or Vomiting  oxyCODONE    IR 5 milliGRAM(s) Oral every 4 hours PRN Moderate Pain (4 - 6)  oxyCODONE    IR 10 milliGRAM(s) Oral every 4 hours PRN Severe Pain (7 - 10)    Height (cm): 177.8 (10-02 @ 09:54)  Weight (kg): 77.6 (10-02 @ 09:54)  BMI (kg/m2): 24.5 (10-02 @ 09:54)  BSA (m2): 1.95 (10-02 @ 09:54)Mode: CPAP with PS, FiO2: 30, PEEP: 6, PS: 5, MAP: 8  Daily Height in cm: 177.8 (02 Oct 2024 09:54)    Daily       ABG - ( 02 Oct 2024 23:45 )  pH, Arterial: 7.430 pH, Blood: x     /  pCO2: 34    /  pO2: 158   / HCO3: 23    / Base Excess: -1.7  /  SaO2: 100.0                                   10.6   17.21 )-----------( 248      ( 02 Oct 2024 21:09 )             31.6   10-02    138  |  105  |  11.2  ----------------------------<  185[H]  4.8   |  20.0[L]  |  0.70    Ca    8.4      02 Oct 2024 17:40    TPro  4.7[L]  /  Alb  3.0[L]  /  TBili  0.6  /  DBili  x   /  AST  33  /  ALT  18  /  AlkPhos  38[L]  10-02    CARDIAC MARKERS ( 02 Oct 2024 17:40 )  x     / x     / x     / x     / 25.2 ng/mL    PT/INR - ( 03 Oct 2024 02:15 )   PT: 12.1 sec;   INR: 1.04 ratio         PTT - ( 03 Oct 2024 02:15 )  PTT:25.8 sec      Objective:  T(C): 37 (10-03-24 @ 03:00), Max: 37.6 (10-02-24 @ 22:15)  HR: 77 (10-03-24 @ 03:00) (69 - 88)  BP: 112/67 (10-02-24 @ 09:54) (112/67 - 112/67)  RR: 22 (10-03-24 @ 03:00) (0 - 25)  SpO2: 100% (10-03-24 @ 03:00) (99% - 100%)  Wt(kg): --CAPILLARY BLOOD GLUCOSE      POCT Blood Glucose.: 123 mg/dL (03 Oct 2024 02:01)  POCT Blood Glucose.: 128 mg/dL (03 Oct 2024 01:10)  POCT Blood Glucose.: 180 mg/dL (02 Oct 2024 23:01)  POCT Blood Glucose.: 193 mg/dL (02 Oct 2024 21:57)  POCT Blood Glucose.: 205 mg/dL (02 Oct 2024 21:03)  POCT Blood Glucose.: 224 mg/dL (02 Oct 2024 20:16)  POCT Blood Glucose.: 241 mg/dL (02 Oct 2024 18:51)  POCT Blood Glucose.: 170 mg/dL (02 Oct 2024 10:03)  I&O's Summary    02 Oct 2024 07:01  -  03 Oct 2024 03:04  --------------------------------------------------------  IN: 1699 mL / OUT: 2075 mL / NET: -376 mL        Physical Exam  General: NAD  Neuro: A+O x 3, non-focal, speech clear and intact  Psych: Appropriate affect  HEENT:  NCAT, No conjuctival edema or icterus, no thrush.  Pulm: CTA, equal bilaterally  CV: RRR, , +S1S2  Abd: soft, NT, ND, +BS  Ext: +DP Pulses b/l, no edema  Skin: Warm, dry, intact  Inc: MSI C/D/I/stable w/ dressing, LE vein harvest site C/D/I with Ace wrap  Chest tubes: mediastinal and pleural CT draining appropriately no al c/d/i        Imaging:      < from: Xray Chest 1 View- PORTABLE-Routine (10.02.24 @ 17:06) >    IMPRESSION:  Status post cardiac surgery.  Suspect LEFT retrocardiac airspace consolidation. NG tube tip at GE   junction. Remaining catheters and tubes in place.    < end of copied text >            
Subjective - patient seen and evaluated bedside. admits to pain at chest tube site. Sitting comfortably in bed. Denies CP, SOB, HA, dizziness, n/v/d    Review of Systems: negative x 10 systems except as noted above    Brief summary:  64yMale POD# 2 CABG    Significant/Alwg68lh events: no acute events      PAST MEDICAL & SURGICAL HISTORY:  History of hyperlipidemia      CAD S/P percutaneous coronary angioplasty  7/10 5 stents and  one stent      Neuropathy      Type 2 diabetes mellitus      BPH (benign prostatic hyperplasia)      Hypertension      CAD (coronary artery disease)  6 stents in the past 5  and 1 in       H/O laminectomy              acetaminophen     Tablet .. 975 milliGRAM(s) Oral every 6 hours  aMIOdarone    Tablet 400 milliGRAM(s) Oral two times a day  ascorbic acid 500 milliGRAM(s) Oral two times a day  aspirin enteric coated 81 milliGRAM(s) Oral daily  atorvastatin 80 milliGRAM(s) Oral at bedtime  bisacodyl Suppository 10 milliGRAM(s) Rectal once  cefuroxime  IVPB 1500 milliGRAM(s) IV Intermittent every 8 hours  chlorhexidine 2% Cloths 1 Application(s) Topical two times a day  clopidogrel Tablet 75 milliGRAM(s) Oral daily  dextrose 5%. 1000 milliLiter(s) IV Continuous <Continuous>  dextrose 5%. 1000 milliLiter(s) IV Continuous <Continuous>  dextrose 50% Injectable 50 milliLiter(s) IV Push every 15 minutes  DULoxetine 30 milliGRAM(s) Oral daily  ezetimibe 10 milliGRAM(s) Oral daily  gabapentin 100 milliGRAM(s) Oral every 8 hours  glucagon  Injectable 1 milliGRAM(s) IntraMuscular once  insulin lispro (ADMELOG) corrective regimen sliding scale   SubCutaneous three times a day before meals  insulin lispro (ADMELOG) corrective regimen sliding scale   SubCutaneous at bedtime  insulin lispro Injectable (ADMELOG) 4 Unit(s) SubCutaneous three times a day before meals  ketorolac   Injectable 15 milliGRAM(s) IV Push every 8 hours  methocarbamol 500 milliGRAM(s) Oral every 8 hours  metoprolol tartrate 12.5 milliGRAM(s) Oral two times a day  ondansetron Injectable 4 milliGRAM(s) IV Push every 6 hours PRN  oxyCODONE    IR 5 milliGRAM(s) Oral every 4 hours PRN  oxyCODONE    IR 10 milliGRAM(s) Oral every 4 hours PRN  pantoprazole    Tablet 40 milliGRAM(s) Oral daily  polyethylene glycol 3350 17 Gram(s) Oral daily  senna 2 Tablet(s) Oral at bedtime  sodium chloride 0.9%. 1000 milliLiter(s) IV Continuous <Continuous>  sodium chloride 0.9%. 1000 milliLiter(s) IV Continuous <Continuous>  tamsulosin 0.4 milliGRAM(s) Oral two times a day  vancomycin  IVPB 1000 milliGRAM(s) IV Intermittent every 12 hours  MEDICATIONS  (PRN):  ondansetron Injectable 4 milliGRAM(s) IV Push every 6 hours PRN Nausea and/or Vomiting  oxyCODONE    IR 5 milliGRAM(s) Oral every 4 hours PRN Moderate Pain (4 - 6)  oxyCODONE    IR 10 milliGRAM(s) Oral every 4 hours PRN Severe Pain (7 - 10)    Height (cm): 177.8 (10-03 @ 03:04)  Weight (kg): 77.6 (10-03 @ 03:04)  BMI (kg/m2): 24.5 (10-03 @ 03:04)  BSA (m2): 1.95 (10-03 @ 03:04)  Daily Height in cm: 177.8 (03 Oct 2024 03:04)    Daily Weight in k.7 (03 Oct 2024 04:09)      ABG - ( 02 Oct 2024 23:45 )  pH, Arterial: 7.430 pH, Blood: x     /  pCO2: 34    /  pO2: 158   / HCO3: 23    / Base Excess: -1.7  /  SaO2: 100.0                                   10.0   14.92 )-----------( 245      ( 03 Oct 2024 02:10 )             29.7   10-03    141  |  110[H]  |  13.8  ----------------------------<  120[H]  4.2   |  21.0[L]  |  0.80    Ca    8.0[L]      03 Oct 2024 02:15  Mg     2.5     10-03    TPro  4.7[L]  /  Alb  3.0[L]  /  TBili  0.6  /  DBili  x   /  AST  33  /  ALT  18  /  AlkPhos  38[L]  1002    CARDIAC MARKERS ( 03 Oct 2024 02:15 )  x     / x     / x     / x     / 21.7 ng/mL  CARDIAC MARKERS ( 02 Oct 2024 17:40 )  x     / x     / x     / x     / 25.2 ng/mL    PT/INR - ( 03 Oct 2024 02:15 )   PT: 12.1 sec;   INR: 1.04 ratio         PTT - ( 03 Oct 2024 02:15 )  PTT:25.8 sec      Objective:  T(C): 37.4 (10-04-24 @ 01:00), Max: 37.5 (10-03-24 @ 07:00)  HR: 78 (10-04-24 @ 01:00) (73 - 90)  BP: 93/54 (10-04-24 @ 01:00) (93/54 - 115/64)  RR: 14 (10-04-24 @ 01:00) (3 - 23)  SpO2: 98% (10-04-24 @ 01:00) (97% - 100%)  Wt(kg): --CAPILLARY BLOOD GLUCOSE      POCT Blood Glucose.: 189 mg/dL (03 Oct 2024 21:28)  POCT Blood Glucose.: 171 mg/dL (03 Oct 2024 20:03)  POCT Blood Glucose.: 169 mg/dL (03 Oct 2024 17:04)  POCT Blood Glucose.: 119 mg/dL (03 Oct 2024 12:34)  POCT Blood Glucose.: 240 mg/dL (03 Oct 2024 09:14)  POCT Blood Glucose.: 160 mg/dL (03 Oct 2024 06:58)  POCT Blood Glucose.: 183 mg/dL (03 Oct 2024 06:10)  POCT Blood Glucose.: 100 mg/dL (03 Oct 2024 04:06)  POCT Blood Glucose.: 105 mg/dL (03 Oct 2024 03:17)  POCT Blood Glucose.: 123 mg/dL (03 Oct 2024 02:01)  I&O's Summary    02 Oct 2024 07:01  -  03 Oct 2024 07:00  --------------------------------------------------------  IN: 2198 mL / OUT: 2715 mL / NET: -517 mL    03 Oct 2024 07:01  -  04 Oct 2024 01:43  --------------------------------------------------------  IN: 1769 mL / OUT: 1020 mL / NET: 749 mL        Physical Exam  General: NAD  Neuro: A+O x 3, non-focal, speech clear and intact  Psych: Appropriate affect  HEENT:  NCAT, No conjuctival edema or icterus, no thrush.  Pulm: CTA, equal bilaterally  CV: RRR,  +S1S2  Abd: soft, NT, ND, +BS  Ext: +DP Pulses b/l, no edema  Skin: Warm, dry, intact  Inc: MSI C/D/I/stable w/ dressing, LE vein harvest site C/D/I with Ace wrap  Chest tubes: mediastinal and pleural chest tubes draining appropriately, no L        Imaging:      < from: Xray Chest 1 View- PORTABLE-Routine (10.02.24 @ 17:06) >    IMPRESSION:  Status post cardiac surgery.  Suspect LEFT retrocardiac airspace consolidation. NG tube tip at GE   junction. Remaining catheters and tubes in place.    < end of copied text >

## 2024-10-07 NOTE — DISCHARGE NOTE PROVIDER - NSDCCPCAREPLAN_GEN_ALL_CORE_FT
PRINCIPAL DISCHARGE DIAGNOSIS  Diagnosis: CAD (coronary artery disease)  Assessment and Plan of Treatment: Please see discharge folder for instructions      SECONDARY DISCHARGE DIAGNOSES  Diagnosis: Hypertension  Assessment and Plan of Treatment: You have hypertension, or high blood pressure. It is very important to continue your medication as ordered. High blood pressure increases your risk for heart attack, stroke and kidney disease. It does not usually cause symptoms but it can be serious.   1. Be sure to take all of your medication as prescribed.  2. You may find it helpful to get a home blood pressure meter and check your blood pressure periodically.  3. Lifestyle changes can improve your blood pressure and lessen the amount of medication you need, such as: losing weight, choosing a diet low in fat and rich in fruits, vegetables and low-fat dairy products, reducing the amount of salt you eat, cut down on alcohol (to less than two drinks per day) and do something active most days for 30 minutes or more.    Diagnosis: Diabetes mellitus  Assessment and Plan of Treatment: It is extremely important to follow a diabetic (consistent carbohydrates, LOW/NO ADDED SUGAR) diet and monitor your glucose (sugar) levels. You have an increased risk of complications, including wound infections, due to the diabetes.  1. Check your glucoses 3-4 times daily before meals and bedtime.   2. Keep a log of your glucose levels every day.   3. Make an appointment to meet with your primary care provider or endocrinologist within a week.   4. Take ALL of your medications as prescribed. Only hold medications for low glucose levels (< 80) or if you are symptomatic (dizzy, sweating, lightheaded).  5. Ideally, we would like all of the glucose levels to be between .   You may have been discharged on different medications than you were taking before. Your body reacts differently to the medications after surgery. Please continue to take the medications as prescribed and notify the office, nurse practitioner or your PCP if you have any concerns right away.

## 2024-10-07 NOTE — PROGRESS NOTE ADULT - ASSESSMENT
64 year old male with HTN, HLD, type 2 diabetes mellitus, CVA (residual left side weakness), carotid disease, BPH, coronary artery disease with PCI to LCx in 2006.  Last  cardiac catheterization revealed multivessel coronary artery disease. Pt reports shortness of breath, exertional dyspnea. Pt  denies chest pain, denies cornell  palpitation, syncope, peripheral edema and any other related issues. Pt scheduled for CABG x 3 on 10/02/24 with Dr. Figueroa  (20 Sep 2024 15:36)  - S/p CABG x 3 (LIMA-LAD, SVG-OM, SVG-PDA) with Dr Figueroa  - NSR on CM    Plan:  S/p CABG x 3 (LIMA-LAD, SVG-OM, SVG-PDA) with Dr Figueroa  Plan as per CT Surgery  Family at bedside.   Patient's primary cardiologist is Dr. Hagan
64M w/ T2DM cb neuropathy/CVA with left sided weakness/ carotid disease/ CAD s/p stents, HTN and HLD presented as outpatient with [facility], found on outpatient cath with MVD and admitted to [facility] for CABG. 10/1 s/p 3v CABG  Consult for diabetes mgmt a1c 7.2    Uncontrolled T2DM- FS acceptable  -A1c 7.2  home meds: metformin 500mg at 2 tabs daily  used to be on januvia but reported hypoglycemia  -Q1FS while on insulin gtt  -check sugars AC and bedtime  -ensure diabetic diet  -can hold off of lantus for now but if FS still running a bit high, can add lantus 10 units QHS  -okay to keep admelog 4 units TID  -continue with insulin sliding scale  -suggest nutritionist consult    CAD- 10/1 s/p 3V CABG, normal EF 50-55%,care per primary team      HLD- continue statin
Pt is a  64 year old male referred PMH  HTN, HLD, type 2 diabetes mellitus, CVA (residual left side weakness), carotid disease, BPH, coronary artery disease with PCI to LCx in 2006.  Last  cardiac catheterization revealed multivessel coronary artery disease. Underwent CABG 10/01/24 with Dr. Figueroa . postop high chest tube output resolved

## 2024-10-07 NOTE — DISCHARGE NOTE PROVIDER - NSDCFUADDINST_GEN_ALL_CORE_FT
Please call the Cardiothoracic Surgery office at [phone] if you are experiencing any shortness of breath, chest pain, fevers or chills, drainage from the incisions, persistent nausea, vomiting or if you have any questions about your medications. If the symptoms are severe, call 911 and go to the nearest hospital.

## 2024-10-07 NOTE — DISCHARGE NOTE PROVIDER - NSDCCPTREATMENT_GEN_ALL_CORE_FT
PRINCIPAL PROCEDURE  Procedure: CABG, with SHALA  Findings and Treatment: CABG x3 (LIMA-LAD, SVG-OM, SVG-PDA) endarectomy PDA & OM      SECONDARY PROCEDURE  Procedure: Open coronary endarterectomy  Findings and Treatment: OM, PDA

## 2024-10-07 NOTE — PROGRESS NOTE ADULT - PROBLEM SELECTOR PLAN 5
Postop urinary retention req straight cath and now replacement of hanks catheter  Continue flomax bid  Small voids with large growing residuals necessitating catheter replacement  urology input appreciated

## 2024-10-07 NOTE — DISCHARGE NOTE NURSING/CASE MANAGEMENT/SOCIAL WORK - PATIENT PORTAL LINK FT
You can access the FollowMyHealth Patient Portal offered by [facility] by registering at the following website: [email or web address]. By joining [facility]’s FollowMyHealth portal, you will also be able to view your health information using other applications (apps) compatible with our system.

## 2024-10-07 NOTE — DISCHARGE NOTE PROVIDER - PROVIDER TOKENS
PROVIDER:[TOKEN:[[id number]]],PROVIDER:[TOKEN:[[id number]]] PROVIDER:[TOKEN:[[id number]]],PROVIDER:[TOKEN:[[id number]]],PROVIDER:[TOKEN:[[id number]],FOLLOWUP:[1 week]] PROVIDER:[TOKEN:[[id number]],SCHEDULEDAPPT:[10/16/2024],SCHEDULEDAPPTTIME:[09:30 AM]],PROVIDER:[TOKEN:[[id number]],FOLLOWUP:[2 weeks]],PROVIDER:[TOKEN:[[id number]],SCHEDULEDAPPT:[10/11/2024],SCHEDULEDAPPTTIME:[11:30 AM]]

## 2024-10-07 NOTE — DISCHARGE NOTE PROVIDER - NSDCFUSCHEDAPPT_GEN_ALL_CORE_FT
Brock Hunt  [facility]  UROLOGY [address]  Scheduled Appointment: 10/11/2024    [facility]  CTSURG [address]  Scheduled Appointment: 10/16/2024

## 2024-10-07 NOTE — DISCHARGE NOTE PROVIDER - NPI NUMBER (FOR SYSADMIN USE ONLY) :
[[id number] [[id number]],[[id number]] [[id number]],[[id number]],[[id number]]

## 2024-10-07 NOTE — DISCHARGE NOTE NURSING/CASE MANAGEMENT/SOCIAL WORK - NSDCFUADDAPPT_GEN_ALL_CORE_FT
Please follow up with Dr. Figueroa in CT Surgery office on______________      The cardiac surgery office is located on the first floor of [facility] at [address] [place], [place]. Please enter through the lobby. A [facility] employee will then direct you where to go.    Your Care Navigator Nurse Practitioner will be in touch to see you in your home within a few days from discharge. The Follow Your Heart program can help ensure you understand your medications, discharge instructions and answer any questions you may have at that time. They are also a great source to address concerns during the day and may be reached at [phone].

## 2024-10-07 NOTE — DISCHARGE NOTE PROVIDER - CARE PROVIDER_API CALL
Chandrakant Figueroa  Thoracic and Cardiac Surgery  [address] [place] [address]  Phone: [phone]  Fax: [phone]  Follow Up Time:     Oz Hagan  Interventional Cardiology  [address], [address]  [place] [address]  Phone: [phone]  Fax: [phone]  Follow Up Time:    Chandrakant Figueroa  Thoracic and Cardiac Surgery  [address] [place] [address]  Phone: [phone]  Fax: [phone]  Follow Up Time:     Oz Hagan  Interventional Cardiology  [address] [facility] [address], [address]  [place] [address]  Phone: [phone]  Fax: [phone]  Follow Up Time:     Bi Dash  Urology  [address] [facility] [address] [facility], [address]  [place] [address]  Phone: [phone]  Fax: [phone]  Follow Up Time: 1 week   Chandrakant Figueroa  Thoracic and Cardiac Surgery  [address] [place] [address]  Phone: [phone]  Fax: [phone]  Scheduled Appointment: 10/16/2024 09:30 AM    Oz Hagan  Interventional Cardiology  [address] [facility] [address], [address]  [place] [address]  Phone: [phone]  Fax: [phone]  Follow Up Time: 2 weeks    Brock Hunt  Urology  [address] [facility] [facility], [address]  [place] [address]  Phone: [phone]  Fax: [phone]  Scheduled Appointment: 10/11/2024 11:30 AM

## 2024-10-07 NOTE — DISCHARGE NOTE PROVIDER - HOSPITAL COURSE
Pt is a  64 year old male referred PMH  HTN, HLD, type 2 diabetes mellitus, CVA (residual left side weakness), carotid disease, BPH, coronary artery disease with PCI to LCx in 2006.  Last  cardiac catheterization revealed multivessel coronary artery disease. Underwent CABG 10/01/24 with endarterectomy with Dr. Figueroa . Postop high chest tube output resolved. Urinary retention requiring replacement of hanks catheter. Otherwise postop course uneventful Pt is a  64 year old male referred PMH  HTN, HLD, type 2 diabetes mellitus, CVA (residual left side weakness), carotid disease, BPH, coronary artery disease with PCI to LCx in 2006.  Last  cardiac catheterization revealed multivessel coronary artery disease. Underwent CABG 10/01/24 with endarterectomy with Dr. Figueroa . Postop high chest tube output resolved. Urinary retention requiring replacement of hanks catheter. Otherwise postop course uneventful. Per Dr. Figueroa patient is now stable and ready for discharge home with outpatient followup. Patient will follow up with Urology as an outpatient for hanks catheter management, appointment scheduled before discharge. Pt is a  64 year old male referred PMH  HTN, HLD, type 2 diabetes mellitus, CVA (residual left side weakness), carotid disease, BPH, coronary artery disease with PCI to LCx in 2006.  Last  cardiac catheterization revealed multivessel coronary artery disease. Underwent CABG 10/01/24 with endarterectomy with Dr. Figueroa . Postop high chest tube output resolved. Urinary retention requiring replacement of hanks catheter. Otherwise postop course uneventful. Per Dr. Figueroa patient is now stable and ready for discharge home with outpatient followup. Patient will follow up with Urology as an outpatient for hanks catheter management, appointment scheduled before discharge.     Will hold home lisinopril in the setting of normotensive blood pressure. Pt can be reevaluated as an outpatient on if/when to restart.

## 2024-10-07 NOTE — PROGRESS NOTE ADULT - PROBLEM SELECTOR PLAN 3
DEDE PULIDO  Will need endocrinology consult
Transitioned off insulin gtt  Premeal insulin/DEDE ACHS, no basal insulin at this time per endocrinology recommendations

## 2024-10-07 NOTE — DISCHARGE NOTE PROVIDER - NSDCMRMEDTOKEN_GEN_ALL_CORE_FT
aspirin 81 mg oral tablet: 1 tab(s) orally once a day  atorvastatin 40 mg oral tablet: 1 tab(s) orally once a day  clopidogrel 75 mg oral tablet: 1 tab(s) orally once a day  DULoxetine 30 mg oral delayed release capsule:  orally once daily  ezetimibe 10 mg oral tablet: 1 tab(s) orally once a day  Farxiga 10 mg oral tablet: 1 tab(s) orally once a day  Fish Oil 1000 mg oral capsule:  orally once daily  gabapentin 300 mg oral tablet: orally 2 times a day  lisinopril 40 mg oral tablet: 1 tab(s) orally once a day  metFORMIN 1000 mg oral tablet: 1 tab(s) orally 2 times a day  multivitamin:   once daily  Nebivolol 20 mg oral tablet: 1 tab(s) orally once a day  slow release iron: daily  tamsulosin 0.4 mg oral capsule: 1 cap(s) orally once a day   aspirin 81 mg oral tablet: 1 tab(s) orally once a day  atorvastatin 40 mg oral tablet: 1 tab(s) orally once a day  clopidogrel 75 mg oral tablet: 1 tab(s) orally once a day  DULoxetine 30 mg oral delayed release capsule:  orally once daily  ezetimibe 10 mg oral tablet: 1 tab(s) orally once a day  Farxiga 10 mg oral tablet: 1 tab(s) orally once a day  Fish Oil 1000 mg oral capsule:  orally once daily  gabapentin 300 mg oral tablet: orally 2 times a day  lisinopril 40 mg oral tablet: 1 tab(s) orally once a day  metFORMIN 1000 mg oral tablet: 1 tab(s) orally 2 times a day  multivitamin:   once daily  Nebivolol 20 mg oral tablet: 1 tab(s) orally once a day  oxyCODONE 5 mg oral tablet: 1 tab(s) orally every 6 hours as needed for severe pain MDD: 4 tabs  slow release iron: daily  tamsulosin 0.4 mg oral capsule: 1 cap(s) orally once a day   aspirin 81 mg oral tablet: 1 tab(s) orally once a day  clopidogrel 75 mg oral tablet: 1 tab(s) orally once a day  DULoxetine 30 mg oral delayed release capsule: 30 delayed release capsule orally once a day once daily  ezetimibe 10 mg oral tablet: 1 tab(s) orally once a day  Farxiga 10 mg oral tablet: 1 tab(s) orally once a day  gabapentin 300 mg oral tablet: orally 2 times a day  Lipitor 80 mg oral tablet: 1 tab(s) orally once a day (at bedtime)  metFORMIN 1000 mg oral tablet: 1 tab(s) orally 2 times a day  metoprolol tartrate 25 mg oral tablet: 1 tab(s) orally 2 times a day  multivitamin:   once daily  oxyCODONE 5 mg oral tablet: 1 tab(s) orally every 6 hours as needed for Moderate Pain (4 - 6)  senna leaf extract oral tablet: 2 tab(s) orally once a day (at bedtime)  slow release iron: daily  tamsulosin 0.4 mg oral capsule: 1 cap(s) orally 2 times a day

## 2024-10-07 NOTE — DISCHARGE NOTE PROVIDER - CARE PROVIDERS DIRECT ADDRESSES
,[email or web address] ,[email or web address],DirectAddress_Unknown,DirectAddress_Unknown

## 2024-10-07 NOTE — PROGRESS NOTE ADULT - PROVIDER SPECIALTY LIST ADULT
Critical Care
Cardiology
Critical Care
Critical Care
Endocrinology
Intervent Cardiology
CT Surgery

## 2024-10-07 NOTE — PROGRESS NOTE ADULT - PROBLEM SELECTOR PLAN 4
SCDs, Lovenox for DVT prophylaxis  Protonix for GI prophylaxis  continue bowel regimen  Strict glucose management and abx for SWI PPX
SCDs for DVT prophylaxis  Lovenox on hold 2/2 bleeding  Protonix for GI prophylaxis  continue bowel regimen  Strict glucose management and abx for SWI PPX

## 2024-10-07 NOTE — PROGRESS NOTE ADULT - PROBLEM SELECTOR PLAN 1
Beta blocker as tolerated by HR and SBP   Lipitor for chronic graft patency prophylaxis  Aspirin and Plavix for acute graft closure prophylaxis  Encourage PO intake  Encourage OOB to chair and ambulation with PT  Encourage deep breathing exercised and coughing  Chest PT and IS use with bedside nurse
Beta blocker as tolerated by HR and SBP once off pressors  Lipitor for chronic graft patency prophylaxis  Aspirin for acute graft closure prophylaxis  Plavix on hold 2/2 high chest tube output. Will discuss resuming in AM rounds  Encourage PO intake  Encourage OOB to chair and ambulation with PT  Encourage deep breathing exercised and coughing  Chest PT and IS use with bedside nurse
Beta blocker as tolerated by HR and SBP   Lipitor for chronic graft patency prophylaxis  Aspirin and Plavix for acute graft closure prophylaxis  Encourage PO intake  Encourage OOB to chair and ambulation with PT  Encourage deep breathing exercised and coughing  Chest PT and IS use with bedside nurse
Beta blocker as tolerated by HR and SBP   Lipitor for chronic graft patency prophylaxis  Aspirin and Plavix for acute graft closure prophylaxis  Encourage PO intake  Encourage OOB to chair and ambulation with PT  Encourage deep breathing exercised and coughing  Chest PT and IS use with bedside nurse

## 2024-10-08 ENCOUNTER — NON-APPOINTMENT (OUTPATIENT)
Age: 64
End: 2024-10-08

## 2024-10-08 PROBLEM — Z95.1 S/P CABG (CORONARY ARTERY BYPASS GRAFT): Status: ACTIVE | Noted: 2024-10-08

## 2024-10-08 RX ORDER — METOPROLOL TARTRATE 25 MG/1
25 TABLET ORAL TWICE DAILY
Qty: 60 | Refills: 1 | Status: ACTIVE | COMMUNITY

## 2024-10-08 RX ORDER — DULOXETINE HYDROCHLORIDE 30 MG/1
30 CAPSULE, DELAYED RELEASE PELLETS ORAL
Refills: 0 | Status: ACTIVE | COMMUNITY

## 2024-10-08 RX ORDER — ATORVASTATIN CALCIUM 80 MG/1
80 TABLET, FILM COATED ORAL
Refills: 0 | Status: ACTIVE | COMMUNITY

## 2024-10-09 ENCOUNTER — APPOINTMENT (OUTPATIENT)
Dept: CARE COORDINATION | Facility: HOME HEALTH | Age: 64
End: 2024-10-09
Payer: MEDICARE

## 2024-10-09 VITALS
BODY MASS INDEX: 25.46 KG/M2 | OXYGEN SATURATION: 95 % | HEIGHT: 68 IN | RESPIRATION RATE: 16 BRPM | WEIGHT: 168 LBS | HEART RATE: 93 BPM

## 2024-10-09 PROCEDURE — 99024 POSTOP FOLLOW-UP VISIT: CPT

## 2024-10-09 RX ORDER — FERROUS SULFATE TAB EC 324 MG (65 MG FE EQUIVALENT) 324 (65 FE) MG
324 (65 FE) TABLET DELAYED RESPONSE ORAL
Refills: 0 | Status: ACTIVE | COMMUNITY

## 2024-10-11 ENCOUNTER — APPOINTMENT (OUTPATIENT)
Dept: UROLOGY | Facility: CLINIC | Age: 64
End: 2024-10-11
Payer: MEDICARE

## 2024-10-11 DIAGNOSIS — R33.8 OTHER RETENTION OF URINE: ICD-10-CM

## 2024-10-11 DIAGNOSIS — N40.0 BENIGN PROSTATIC HYPERPLASIA WITHOUT LOWER URINARY TRACT SYMPMS: ICD-10-CM

## 2024-10-11 PROCEDURE — 99203 OFFICE O/P NEW LOW 30 MIN: CPT

## 2024-10-15 LAB — SURGICAL PATHOLOGY STUDY: SIGNIFICANT CHANGE UP

## 2024-10-15 PROCEDURE — 83735 ASSAY OF MAGNESIUM: CPT

## 2024-10-15 PROCEDURE — 81001 URINALYSIS AUTO W/SCOPE: CPT

## 2024-10-15 PROCEDURE — 84134 ASSAY OF PREALBUMIN: CPT

## 2024-10-15 PROCEDURE — 93880 EXTRACRANIAL BILAT STUDY: CPT

## 2024-10-15 PROCEDURE — 85610 PROTHROMBIN TIME: CPT

## 2024-10-15 PROCEDURE — 85025 COMPLETE CBC W/AUTO DIFF WBC: CPT

## 2024-10-15 PROCEDURE — 87641 MR-STAPH DNA AMP PROBE: CPT

## 2024-10-15 PROCEDURE — 83880 ASSAY OF NATRIURETIC PEPTIDE: CPT

## 2024-10-15 PROCEDURE — 87640 STAPH A DNA AMP PROBE: CPT

## 2024-10-15 PROCEDURE — 93306 TTE W/DOPPLER COMPLETE: CPT

## 2024-10-15 PROCEDURE — 83036 HEMOGLOBIN GLYCOSYLATED A1C: CPT

## 2024-10-15 PROCEDURE — 84443 ASSAY THYROID STIM HORMONE: CPT

## 2024-10-15 PROCEDURE — 93005 ELECTROCARDIOGRAM TRACING: CPT

## 2024-10-15 PROCEDURE — 36415 COLL VENOUS BLD VENIPUNCTURE: CPT

## 2024-10-15 PROCEDURE — 80053 COMPREHEN METABOLIC PANEL: CPT

## 2024-10-15 PROCEDURE — C1894: CPT

## 2024-10-15 PROCEDURE — C1887: CPT

## 2024-10-15 PROCEDURE — C1769: CPT

## 2024-10-15 PROCEDURE — 93458 L HRT ARTERY/VENTRICLE ANGIO: CPT

## 2024-10-15 PROCEDURE — 85730 THROMBOPLASTIN TIME PARTIAL: CPT

## 2024-10-16 ENCOUNTER — APPOINTMENT (OUTPATIENT)
Dept: CARDIOTHORACIC SURGERY | Facility: CLINIC | Age: 64
End: 2024-10-16
Payer: MEDICARE

## 2024-10-16 VITALS
SYSTOLIC BLOOD PRESSURE: 120 MMHG | TEMPERATURE: 98 F | RESPIRATION RATE: 16 BRPM | BODY MASS INDEX: 24.2 KG/M2 | WEIGHT: 169 LBS | HEIGHT: 70 IN | HEART RATE: 101 BPM | OXYGEN SATURATION: 97 % | DIASTOLIC BLOOD PRESSURE: 78 MMHG

## 2024-10-16 DIAGNOSIS — E13.9 OTHER SPECIFIED DIABETES MELLITUS W/OUT COMPLICATIONS: ICD-10-CM

## 2024-10-16 DIAGNOSIS — Z95.1 PRESENCE OF AORTOCORONARY BYPASS GRAFT: ICD-10-CM

## 2024-10-16 PROCEDURE — 99024 POSTOP FOLLOW-UP VISIT: CPT

## 2024-10-16 RX ORDER — OXYCODONE 5 MG/1
5 TABLET ORAL
Refills: 0 | Status: COMPLETED | COMMUNITY
End: 2024-10-16

## 2024-10-16 RX ORDER — SENNOSIDES 8.6 MG TABLETS 8.6 MG/1
8.6 TABLET ORAL
Refills: 0 | Status: COMPLETED | COMMUNITY
End: 2024-10-16

## 2024-10-18 ENCOUNTER — APPOINTMENT (OUTPATIENT)
Dept: UROLOGY | Facility: CLINIC | Age: 64
End: 2024-10-18
Payer: MEDICARE

## 2024-10-18 DIAGNOSIS — R33.8 OTHER RETENTION OF URINE: ICD-10-CM

## 2024-10-18 DIAGNOSIS — N40.0 BENIGN PROSTATIC HYPERPLASIA WITHOUT LOWER URINARY TRACT SYMPMS: ICD-10-CM

## 2024-10-18 PROCEDURE — 99211 OFF/OP EST MAY X REQ PHY/QHP: CPT

## 2024-11-26 PROCEDURE — 93005 ELECTROCARDIOGRAM TRACING: CPT

## 2024-11-26 PROCEDURE — 82435 ASSAY OF BLOOD CHLORIDE: CPT

## 2024-11-26 PROCEDURE — 82553 CREATINE MB FRACTION: CPT

## 2024-11-26 PROCEDURE — 83735 ASSAY OF MAGNESIUM: CPT

## 2024-11-26 PROCEDURE — 86850 RBC ANTIBODY SCREEN: CPT

## 2024-11-26 PROCEDURE — 82947 ASSAY GLUCOSE BLOOD QUANT: CPT

## 2024-11-26 PROCEDURE — 86900 BLOOD TYPING SEROLOGIC ABO: CPT

## 2024-11-26 PROCEDURE — 94760 N-INVAS EAR/PLS OXIMETRY 1: CPT

## 2024-11-26 PROCEDURE — 85014 HEMATOCRIT: CPT

## 2024-11-26 PROCEDURE — 82330 ASSAY OF CALCIUM: CPT

## 2024-11-26 PROCEDURE — C1751: CPT

## 2024-11-26 PROCEDURE — 84132 ASSAY OF SERUM POTASSIUM: CPT

## 2024-11-26 PROCEDURE — 83605 ASSAY OF LACTIC ACID: CPT

## 2024-11-26 PROCEDURE — 82803 BLOOD GASES ANY COMBINATION: CPT

## 2024-11-26 PROCEDURE — 84295 ASSAY OF SERUM SODIUM: CPT

## 2024-11-26 PROCEDURE — 86965 POOLING BLOOD PLATELETS: CPT

## 2024-11-26 PROCEDURE — P9047: CPT

## 2024-11-26 PROCEDURE — P9100: CPT

## 2024-11-26 PROCEDURE — C1889: CPT

## 2024-11-26 PROCEDURE — 71045 X-RAY EXAM CHEST 1 VIEW: CPT

## 2024-11-26 PROCEDURE — 80053 COMPREHEN METABOLIC PANEL: CPT

## 2024-11-26 PROCEDURE — 85018 HEMOGLOBIN: CPT

## 2024-11-26 PROCEDURE — 85027 COMPLETE CBC AUTOMATED: CPT

## 2024-11-26 PROCEDURE — 87077 CULTURE AEROBIC IDENTIFY: CPT

## 2024-11-26 PROCEDURE — 85730 THROMBOPLASTIN TIME PARTIAL: CPT

## 2024-11-26 PROCEDURE — P9012: CPT

## 2024-11-26 PROCEDURE — 36430 TRANSFUSION BLD/BLD COMPNT: CPT

## 2024-11-26 PROCEDURE — 88311 DECALCIFY TISSUE: CPT

## 2024-11-26 PROCEDURE — 82550 ASSAY OF CK (CPK): CPT

## 2024-11-26 PROCEDURE — P9045: CPT

## 2024-11-26 PROCEDURE — 87086 URINE CULTURE/COLONY COUNT: CPT

## 2024-11-26 PROCEDURE — 93325 DOPPLER ECHO COLOR FLOW MAPG: CPT

## 2024-11-26 PROCEDURE — 84484 ASSAY OF TROPONIN QUANT: CPT

## 2024-11-26 PROCEDURE — 94002 VENT MGMT INPAT INIT DAY: CPT

## 2024-11-26 PROCEDURE — 93320 DOPPLER ECHO COMPLETE: CPT

## 2024-11-26 PROCEDURE — 85025 COMPLETE CBC W/AUTO DIFF WBC: CPT

## 2024-11-26 PROCEDURE — C1769: CPT

## 2024-11-26 PROCEDURE — 86901 BLOOD TYPING SEROLOGIC RH(D): CPT

## 2024-11-26 PROCEDURE — 80048 BASIC METABOLIC PNL TOTAL CA: CPT

## 2024-11-26 PROCEDURE — 88304 TISSUE EXAM BY PATHOLOGIST: CPT

## 2024-11-26 PROCEDURE — 85385 FIBRINOGEN ANTIGEN: CPT

## 2024-11-26 PROCEDURE — P9016: CPT

## 2024-11-26 PROCEDURE — 85610 PROTHROMBIN TIME: CPT

## 2024-11-26 PROCEDURE — 86891 AUTOLOGOUS BLOOD OP SALVAGE: CPT

## 2024-11-26 PROCEDURE — 82962 GLUCOSE BLOOD TEST: CPT

## 2024-11-26 PROCEDURE — 86923 COMPATIBILITY TEST ELECTRIC: CPT

## 2024-11-26 PROCEDURE — 36415 COLL VENOUS BLD VENIPUNCTURE: CPT

## 2024-11-26 PROCEDURE — P9037: CPT

## 2024-11-26 PROCEDURE — 81001 URINALYSIS AUTO W/SCOPE: CPT

## 2024-12-27 ENCOUNTER — APPOINTMENT (OUTPATIENT)
Dept: CARDIOLOGY | Facility: CLINIC | Age: 64
End: 2024-12-27

## 2025-01-17 ENCOUNTER — APPOINTMENT (OUTPATIENT)
Dept: UROLOGY | Facility: CLINIC | Age: 65
End: 2025-01-17
Payer: MEDICARE

## 2025-01-17 VITALS
BODY MASS INDEX: 24.34 KG/M2 | WEIGHT: 170 LBS | DIASTOLIC BLOOD PRESSURE: 89 MMHG | HEIGHT: 70 IN | SYSTOLIC BLOOD PRESSURE: 143 MMHG

## 2025-01-17 DIAGNOSIS — N52.9 MALE ERECTILE DYSFUNCTION, UNSPECIFIED: ICD-10-CM

## 2025-01-17 PROCEDURE — 99214 OFFICE O/P EST MOD 30 MIN: CPT

## 2025-01-18 LAB
APPEARANCE: CLEAR
BILIRUBIN URINE: NEGATIVE
BLOOD URINE: NEGATIVE
COLOR: YELLOW
GLUCOSE QUALITATIVE U: >=1000
KETONES URINE: ABNORMAL
LEUKOCYTE ESTERASE URINE: NEGATIVE
NITRITE URINE: NEGATIVE
PH URINE: 5
PROTEIN URINE: NEGATIVE
SPECIFIC GRAVITY URINE: 1.01
UROBILINOGEN URINE: 0.2 (ref 0.2–?)

## 2025-01-31 ENCOUNTER — APPOINTMENT (OUTPATIENT)
Dept: UROLOGY | Facility: CLINIC | Age: 65
End: 2025-01-31
Payer: MEDICARE

## 2025-01-31 DIAGNOSIS — N32.81 OVERACTIVE BLADDER: ICD-10-CM

## 2025-01-31 LAB
APPEARANCE: CLEAR
BILIRUBIN URINE: NEGATIVE
BLOOD URINE: NEGATIVE
COLOR: YELLOW
GLUCOSE QUALITATIVE U: 100
KETONES URINE: NEGATIVE
LEUKOCYTE ESTERASE URINE: ABNORMAL
NITRITE URINE: NEGATIVE
PH URINE: 5.5
PROTEIN URINE: NEGATIVE
SPECIFIC GRAVITY URINE: >=1.03
UROBILINOGEN URINE: 0.2 (ref 0.2–?)

## 2025-01-31 PROCEDURE — 51797 INTRAABDOMINAL PRESSURE TEST: CPT

## 2025-01-31 PROCEDURE — 51728 CYSTOMETROGRAM W/VP: CPT

## 2025-01-31 PROCEDURE — 51798 US URINE CAPACITY MEASURE: CPT

## 2025-01-31 PROCEDURE — 51784 ANAL/URINARY MUSCLE STUDY: CPT

## 2025-01-31 PROCEDURE — 51741 ELECTRO-UROFLOWMETRY FIRST: CPT

## 2025-02-03 ENCOUNTER — APPOINTMENT (OUTPATIENT)
Dept: CARDIOLOGY | Facility: CLINIC | Age: 65
End: 2025-02-03
Payer: MEDICARE

## 2025-02-03 ENCOUNTER — APPOINTMENT (OUTPATIENT)
Dept: CARDIOLOGY | Facility: CLINIC | Age: 65
End: 2025-02-03

## 2025-02-03 PROCEDURE — 93798 PHYS/QHP OP CAR RHAB W/ECG: CPT

## 2025-02-05 ENCOUNTER — APPOINTMENT (OUTPATIENT)
Dept: CARDIOLOGY | Facility: CLINIC | Age: 65
End: 2025-02-05
Payer: MEDICARE

## 2025-02-05 PROCEDURE — 93798 PHYS/QHP OP CAR RHAB W/ECG: CPT

## 2025-02-07 ENCOUNTER — APPOINTMENT (OUTPATIENT)
Dept: UROLOGY | Facility: CLINIC | Age: 65
End: 2025-02-07
Payer: MEDICARE

## 2025-02-07 DIAGNOSIS — N52.9 MALE ERECTILE DYSFUNCTION, UNSPECIFIED: ICD-10-CM

## 2025-02-07 DIAGNOSIS — N40.0 BENIGN PROSTATIC HYPERPLASIA WITHOUT LOWER URINARY TRACT SYMPMS: ICD-10-CM

## 2025-02-07 PROCEDURE — 99214 OFFICE O/P EST MOD 30 MIN: CPT

## 2025-02-10 ENCOUNTER — APPOINTMENT (OUTPATIENT)
Dept: CARDIOLOGY | Facility: CLINIC | Age: 65
End: 2025-02-10

## 2025-02-12 ENCOUNTER — APPOINTMENT (OUTPATIENT)
Dept: CARDIOLOGY | Facility: CLINIC | Age: 65
End: 2025-02-12

## 2025-02-19 ENCOUNTER — APPOINTMENT (OUTPATIENT)
Dept: CARDIOLOGY | Facility: CLINIC | Age: 65
End: 2025-02-19

## 2025-02-21 ENCOUNTER — APPOINTMENT (OUTPATIENT)
Dept: UROLOGY | Facility: CLINIC | Age: 65
End: 2025-02-21

## 2025-02-21 DIAGNOSIS — R33.8 OTHER RETENTION OF URINE: ICD-10-CM

## 2025-02-21 DIAGNOSIS — N36.44 MUSCULAR DISORDERS OF URETHRA: ICD-10-CM

## 2025-02-21 PROCEDURE — 52000 CYSTOURETHROSCOPY: CPT

## 2025-02-21 PROCEDURE — 76872 US TRANSRECTAL: CPT

## 2025-02-24 ENCOUNTER — APPOINTMENT (OUTPATIENT)
Dept: CARDIOLOGY | Facility: CLINIC | Age: 65
End: 2025-02-24
Payer: MEDICARE

## 2025-02-24 PROCEDURE — 93798 PHYS/QHP OP CAR RHAB W/ECG: CPT

## 2025-02-26 ENCOUNTER — APPOINTMENT (OUTPATIENT)
Dept: CARDIOLOGY | Facility: CLINIC | Age: 65
End: 2025-02-26
Payer: MEDICARE

## 2025-02-26 PROCEDURE — 93798 PHYS/QHP OP CAR RHAB W/ECG: CPT

## 2025-03-03 ENCOUNTER — APPOINTMENT (OUTPATIENT)
Dept: CARDIOLOGY | Facility: CLINIC | Age: 65
End: 2025-03-03
Payer: MEDICARE

## 2025-03-03 PROCEDURE — 93798 PHYS/QHP OP CAR RHAB W/ECG: CPT

## 2025-03-05 ENCOUNTER — APPOINTMENT (OUTPATIENT)
Dept: CARDIOLOGY | Facility: CLINIC | Age: 65
End: 2025-03-05

## 2025-03-10 ENCOUNTER — APPOINTMENT (OUTPATIENT)
Dept: CARDIOLOGY | Facility: CLINIC | Age: 65
End: 2025-03-10
Payer: MEDICARE

## 2025-03-10 PROCEDURE — 93798 PHYS/QHP OP CAR RHAB W/ECG: CPT

## 2025-03-12 ENCOUNTER — APPOINTMENT (OUTPATIENT)
Dept: CARDIOLOGY | Facility: CLINIC | Age: 65
End: 2025-03-12
Payer: MEDICARE

## 2025-03-12 PROCEDURE — 93798 PHYS/QHP OP CAR RHAB W/ECG: CPT

## 2025-03-17 ENCOUNTER — APPOINTMENT (OUTPATIENT)
Dept: CARDIOLOGY | Facility: CLINIC | Age: 65
End: 2025-03-17
Payer: MEDICARE

## 2025-03-17 ENCOUNTER — APPOINTMENT (OUTPATIENT)
Dept: UROLOGY | Facility: CLINIC | Age: 65
End: 2025-03-17
Payer: MEDICARE

## 2025-03-17 DIAGNOSIS — N52.9 MALE ERECTILE DYSFUNCTION, UNSPECIFIED: ICD-10-CM

## 2025-03-17 PROCEDURE — 99215 OFFICE O/P EST HI 40 MIN: CPT | Mod: 93

## 2025-03-17 PROCEDURE — 93798 PHYS/QHP OP CAR RHAB W/ECG: CPT

## 2025-03-19 ENCOUNTER — APPOINTMENT (OUTPATIENT)
Dept: CARDIOLOGY | Facility: CLINIC | Age: 65
End: 2025-03-19
Payer: MEDICARE

## 2025-03-19 PROCEDURE — 93798 PHYS/QHP OP CAR RHAB W/ECG: CPT

## 2025-03-24 ENCOUNTER — APPOINTMENT (OUTPATIENT)
Dept: CARDIOLOGY | Facility: CLINIC | Age: 65
End: 2025-03-24
Payer: MEDICARE

## 2025-03-24 PROCEDURE — 93798 PHYS/QHP OP CAR RHAB W/ECG: CPT

## 2025-03-26 ENCOUNTER — APPOINTMENT (OUTPATIENT)
Dept: CARDIOLOGY | Facility: CLINIC | Age: 65
End: 2025-03-26
Payer: MEDICARE

## 2025-03-26 PROCEDURE — 93798 PHYS/QHP OP CAR RHAB W/ECG: CPT

## 2025-03-31 ENCOUNTER — APPOINTMENT (OUTPATIENT)
Dept: CARDIOLOGY | Facility: CLINIC | Age: 65
End: 2025-03-31

## 2025-04-02 ENCOUNTER — APPOINTMENT (OUTPATIENT)
Dept: CARDIOLOGY | Facility: CLINIC | Age: 65
End: 2025-04-02

## 2025-04-07 ENCOUNTER — APPOINTMENT (OUTPATIENT)
Dept: CARDIOLOGY | Facility: CLINIC | Age: 65
End: 2025-04-07

## 2025-04-09 ENCOUNTER — APPOINTMENT (OUTPATIENT)
Dept: CARDIOLOGY | Facility: CLINIC | Age: 65
End: 2025-04-09
Payer: MEDICARE

## 2025-04-09 PROCEDURE — 93798 PHYS/QHP OP CAR RHAB W/ECG: CPT

## 2025-04-14 ENCOUNTER — APPOINTMENT (OUTPATIENT)
Dept: CARDIOLOGY | Facility: CLINIC | Age: 65
End: 2025-04-14

## 2025-04-16 ENCOUNTER — APPOINTMENT (OUTPATIENT)
Dept: CARDIOLOGY | Facility: CLINIC | Age: 65
End: 2025-04-16
Payer: MEDICARE

## 2025-04-16 PROCEDURE — 93798 PHYS/QHP OP CAR RHAB W/ECG: CPT

## 2025-04-21 ENCOUNTER — APPOINTMENT (OUTPATIENT)
Dept: CARDIOLOGY | Facility: CLINIC | Age: 65
End: 2025-04-21
Payer: MEDICARE

## 2025-04-21 PROCEDURE — 93798 PHYS/QHP OP CAR RHAB W/ECG: CPT

## 2025-04-23 ENCOUNTER — APPOINTMENT (OUTPATIENT)
Dept: CARDIOLOGY | Facility: CLINIC | Age: 65
End: 2025-04-23
Payer: MEDICARE

## 2025-04-23 PROCEDURE — 93798 PHYS/QHP OP CAR RHAB W/ECG: CPT

## (undated) DEVICE — VASOVIEW HEMOPRO 2

## (undated) DEVICE — SOL INJ NS 0.9% 1000ML

## (undated) DEVICE — SUT PLEDGET 9MM X 4MM X 1.5MM

## (undated) DEVICE — CONNECTOR STRAIGHT 3/8 X 1/2"

## (undated) DEVICE — WARMING BLANKET DUO-THERM HYPER/HYPOTHERM ADULT

## (undated) DEVICE — STEALTH CLAMP INSERT FIBRA/FIBRA 60MM

## (undated) DEVICE — POSITIONER FOAM EGG CRATE ULNAR 2PCS (PINK)

## (undated) DEVICE — SUT PROLENE 4-0 30" SH-1

## (undated) DEVICE — SUT DOUBLE 6 WIRE STERNAL

## (undated) DEVICE — SUT SILK 0 30" TIES

## (undated) DEVICE — SUT ETHIBOND 2-0 36" SH

## (undated) DEVICE — GLV 7.5 PROTEXIS (WHITE)

## (undated) DEVICE — TUBING INSUFFLATION LAP FILTER 10FT

## (undated) DEVICE — SUT PROLENE 4-0 36" RB-1

## (undated) DEVICE — NDL COUNTER FOAM AND MAGNET 40-70

## (undated) DEVICE — DRSG MEPILEX 10 X 10CM (4 X 4") AG

## (undated) DEVICE — SUT VICRYL 1 36" CTX UNDYED

## (undated) DEVICE — SUT PROLENE 3-0 36" SH

## (undated) DEVICE — SUT VICRYL 3-0 27" CT-1

## (undated) DEVICE — STAPLER SKIN PROXIMATE

## (undated) DEVICE — PACK VALVE

## (undated) DEVICE — SUT VICRYL 2-0 27" CT-1 UNDYED

## (undated) DEVICE — AORTIC PUNCH 5MM STANDARD HANDLE

## (undated) DEVICE — DRSG OPSITE 2.5 X 2"

## (undated) DEVICE — ELCTR MULTIFUNCTION DEFIBRILLATION ELECTRODE EDGE SYSTEM ADULT

## (undated) DEVICE — ELCTR BOVIE BLADE 3/4" EXTENDED LENGTH 6"

## (undated) DEVICE — SUT SILK 5-0 60" TIES

## (undated) DEVICE — SUT ETHIBOND 4-0 36" RB-1

## (undated) DEVICE — SUT SILK 4-0 30" RB-1

## (undated) DEVICE — SYR LUER LOK 20CC

## (undated) DEVICE — SOL IRR POUR NS 0.9% 1000ML

## (undated) DEVICE — SUT ETHIBOND 2-0 4-30" RB-1 WHITE

## (undated) DEVICE — MAXI-FLO SUCTION CATHETER KIT 14FR STRAIGHT

## (undated) DEVICE — CHEST DRAIN PLEUR-EVAC DRY/WET ADULT-PEDS SINGLE (QUICK)

## (undated) DEVICE — DRAPE 3/4 SHEET 52X76"

## (undated) DEVICE — DRAPE SLUSH / WARMER 44 X 66"

## (undated) DEVICE — SUT ETHIBOND 3-0 36" RB-1

## (undated) DEVICE — SUT SILK 0 30" SH

## (undated) DEVICE — PREP CHLORAPREP HI-LITE ORANGE 26ML

## (undated) DEVICE — SOL ANTI FOG

## (undated) DEVICE — DRSG TEGADERM 4X4.75"

## (undated) DEVICE — SUT PDS II 2-0 27" CT-1

## (undated) DEVICE — DRSG OPSITE 13.75 X 4"

## (undated) DEVICE — MARKING PEN W RULER

## (undated) DEVICE — SYS VEIN HARVESTING VIRTUOSAPH PLUS W/ RADIAL

## (undated) DEVICE — DRSG MEPILEX 10 X 30CM (4 X 12") WHITE

## (undated) DEVICE — SUT MONOCRYL 4-0 27" PS-2 UNDYED

## (undated) DEVICE — TONGUE DEPRESSOR

## (undated) DEVICE — BEAVER BLADE MINI SHARP ALL ROUND (BLUE)

## (undated) DEVICE — ELCTR BOVIE PENCIL HANDPIECE ROCKER SWITCH 15FT

## (undated) DEVICE — SUT PROLENE 6-0 30" C-1

## (undated) DEVICE — DRSG MEPILEX 10 X 25CM (4 X 10") POST OP AG SILVER

## (undated) DEVICE — SUT PROLENE 7-0 24" BV-1

## (undated) DEVICE — SUT PROLENE 7-0 24" BV175-6

## (undated) DEVICE — PRESSURE INFUSOR BAG 1000ML

## (undated) DEVICE — VISITEC 4X4

## (undated) DEVICE — BLOWER MISTER VIPER II

## (undated) DEVICE — SUT VICRYL 0 36" CTX UNDYED

## (undated) DEVICE — DRAPE TOWEL BLUE 17" X 24"

## (undated) DEVICE — SYNOVIS VASCULAR PROBE 1.5MM 15CM

## (undated) DEVICE — DRAPE IOBAN 33" X 23"

## (undated) DEVICE — DRAIN SILICONE MEDIASTINAL 9MM STRL

## (undated) DEVICE — SUT ETHIBOND 1 30" OS6

## (undated) DEVICE — SUT PROLENE 4-0 36" SH

## (undated) DEVICE — SUT SILK 2-0 18" SH (POP-OFF)

## (undated) DEVICE — WOUND IRR IRRISEPT W 0.5 CHG

## (undated) DEVICE — PREP SCRUB BRUSH W CHG 4%

## (undated) DEVICE — STOPCOCK 3 WAY W SWIVEL MALE LUER LOCK

## (undated) DEVICE — BULLDOG SPRING CLIP 6MM SOFT/SOFT

## (undated) DEVICE — LAP PAD 18 X 18"

## (undated) DEVICE — GOWN TRIMAX LG

## (undated) DEVICE — PACK OPEN HEART VAMP PLUS

## (undated) DEVICE — VESSEL LOOP MAXI-RED  0.120" X 16"